# Patient Record
Sex: FEMALE | Race: WHITE | NOT HISPANIC OR LATINO | Employment: FULL TIME | URBAN - METROPOLITAN AREA
[De-identification: names, ages, dates, MRNs, and addresses within clinical notes are randomized per-mention and may not be internally consistent; named-entity substitution may affect disease eponyms.]

---

## 2017-09-12 ENCOUNTER — TRANSCRIBE ORDERS (OUTPATIENT)
Dept: ADMINISTRATIVE | Facility: HOSPITAL | Age: 22
End: 2017-09-12

## 2017-09-12 DIAGNOSIS — R79.89 LOW TSH LEVEL: Primary | ICD-10-CM

## 2017-09-28 ENCOUNTER — HOSPITAL ENCOUNTER (OUTPATIENT)
Dept: ULTRASOUND IMAGING | Facility: HOSPITAL | Age: 22
Discharge: HOME | End: 2017-09-28
Admitting: INTERNAL MEDICINE

## 2017-09-28 DIAGNOSIS — R79.89 LOW TSH LEVEL: ICD-10-CM

## 2017-09-28 PROCEDURE — 76536 US EXAM OF HEAD AND NECK: CPT

## 2017-10-10 ENCOUNTER — TRANSCRIBE ORDERS (OUTPATIENT)
Dept: ADMINISTRATIVE | Facility: HOSPITAL | Age: 22
End: 2017-10-10

## 2017-10-10 DIAGNOSIS — R59.9 LYMPH NODES ENLARGED: Primary | ICD-10-CM

## 2017-10-11 ENCOUNTER — HOSPITAL ENCOUNTER (OUTPATIENT)
Dept: CT IMAGING | Facility: HOSPITAL | Age: 22
Discharge: HOME | End: 2017-10-11
Admitting: INTERNAL MEDICINE

## 2017-10-11 DIAGNOSIS — R59.9 LYMPH NODES ENLARGED: ICD-10-CM

## 2017-10-11 PROCEDURE — 0 IOPAMIDOL 61 % SOLUTION: Performed by: INTERNAL MEDICINE

## 2017-10-11 PROCEDURE — 70491 CT SOFT TISSUE NECK W/DYE: CPT

## 2017-10-11 RX ADMIN — IOPAMIDOL 75 ML: 612 INJECTION, SOLUTION INTRAVENOUS at 18:05

## 2017-10-19 ENCOUNTER — OFFICE VISIT (OUTPATIENT)
Dept: NEUROLOGY | Facility: CLINIC | Age: 22
End: 2017-10-19

## 2017-10-19 VITALS
BODY MASS INDEX: 23.78 KG/M2 | HEIGHT: 66 IN | DIASTOLIC BLOOD PRESSURE: 60 MMHG | WEIGHT: 148 LBS | SYSTOLIC BLOOD PRESSURE: 100 MMHG

## 2017-10-19 DIAGNOSIS — G43.009 MIGRAINE WITHOUT AURA AND WITHOUT STATUS MIGRAINOSUS, NOT INTRACTABLE: Primary | ICD-10-CM

## 2017-10-19 PROCEDURE — 99212 OFFICE O/P EST SF 10 MIN: CPT | Performed by: PSYCHIATRY & NEUROLOGY

## 2017-10-19 RX ORDER — TOPIRAMATE 25 MG/1
25 TABLET ORAL NIGHTLY
Qty: 30 TABLET | Refills: 11 | Status: SHIPPED | OUTPATIENT
Start: 2017-10-19 | End: 2018-10-22 | Stop reason: SDUPTHER

## 2017-10-19 RX ORDER — RIZATRIPTAN BENZOATE 10 MG/1
10 TABLET ORAL ONCE AS NEEDED
Qty: 9 TABLET | Refills: 11 | Status: SHIPPED | OUTPATIENT
Start: 2017-10-19 | End: 2018-10-22 | Stop reason: SDUPTHER

## 2017-10-19 NOTE — PROGRESS NOTES
Subjective:     Patient ID: Taty Chacko is a 22 y.o. female.    History of Present Illness     Patient continues to have frequent severe headaches.  Tolerating topiramate 25 mg at night.  Maxalt 10 mg works.  No new problem.  The following portions of the patient's history were reviewed and updated as appropriate: allergies, current medications, past family history, past medical history, past social history, past surgical history and problem list.      Current Outpatient Prescriptions:   •  Etonogestrel (NEXPLANON) 68 MG implant subdermal implant, Inject 1 each into the skin 1 (One) Time., Disp: , Rfl:   •  ibuprofen (ADVIL,MOTRIN) 200 MG tablet, Take  by mouth., Disp: , Rfl:   •  rizatriptan (MAXALT) 10 MG tablet, Take 1 tablet by mouth 1 (One) Time As Needed for Migraine for up to 1 dose., Disp: 9 tablet, Rfl: 11  •  topiramate (TOPAMAX) 25 MG tablet, Take 1 tablet by mouth Every Night., Disp: 30 tablet, Rfl: 11    Review of Systems   Constitutional: Negative.    Neurological: Positive for headaches. Negative for dizziness, tremors, seizures, syncope, facial asymmetry, speech difficulty, weakness, light-headedness and numbness.   Psychiatric/Behavioral: Negative.         Objective:    Neurologic Exam  Mental status examination was appropriate.  Funduscopy, visual fields, eye movements and pupillary reflexes were normal.  No facial weakness was noted.  Gait was normal.  No pattern of focal weakness was noted.  Physical Exam    Assessment/Plan:     Taty was seen today for migraine.    Diagnoses and all orders for this visit:    Migraine without aura and without status migrainosus, not intractable    Other orders  -     topiramate (TOPAMAX) 25 MG tablet; Take 1 tablet by mouth Every Night.  -     rizatriptan (MAXALT) 10 MG tablet; Take 1 tablet by mouth 1 (One) Time As Needed for Migraine for up to 1 dose.       Prescription drug management - meds as above    Follow-up in the office in one year. Thank you for  allowing me to share in the care of this patient.  Walker Wright M.D.

## 2018-01-09 ENCOUNTER — TRANSCRIBE ORDERS (OUTPATIENT)
Dept: ADMINISTRATIVE | Facility: HOSPITAL | Age: 23
End: 2018-01-09

## 2018-01-09 DIAGNOSIS — R59.0 CERVICAL ADENOPATHY: Primary | ICD-10-CM

## 2018-01-09 DIAGNOSIS — E04.1 THYROID CYST: ICD-10-CM

## 2018-01-15 ENCOUNTER — HOSPITAL ENCOUNTER (OUTPATIENT)
Dept: ULTRASOUND IMAGING | Facility: HOSPITAL | Age: 23
Discharge: HOME | End: 2018-01-15
Admitting: NURSE PRACTITIONER

## 2018-01-15 ENCOUNTER — HOSPITAL ENCOUNTER (OUTPATIENT)
Dept: ULTRASOUND IMAGING | Facility: HOSPITAL | Age: 23
Discharge: HOME | End: 2018-01-15

## 2018-01-15 DIAGNOSIS — R59.0 CERVICAL ADENOPATHY: ICD-10-CM

## 2018-01-15 DIAGNOSIS — E04.1 THYROID CYST: ICD-10-CM

## 2018-01-15 PROCEDURE — 76536 US EXAM OF HEAD AND NECK: CPT

## 2018-04-23 ENCOUNTER — APPOINTMENT (OUTPATIENT)
Dept: CT IMAGING | Facility: HOSPITAL | Age: 23
End: 2018-04-23

## 2018-04-23 ENCOUNTER — HOSPITAL ENCOUNTER (EMERGENCY)
Facility: HOSPITAL | Age: 23
Discharge: HOME | End: 2018-04-24
Attending: EMERGENCY MEDICINE | Admitting: EMERGENCY MEDICINE

## 2018-04-23 VITALS
OXYGEN SATURATION: 100 % | BODY MASS INDEX: 23.3 KG/M2 | SYSTOLIC BLOOD PRESSURE: 121 MMHG | WEIGHT: 145 LBS | HEART RATE: 67 BPM | TEMPERATURE: 98.9 F | HEIGHT: 66 IN | RESPIRATION RATE: 15 BRPM | DIASTOLIC BLOOD PRESSURE: 74 MMHG

## 2018-04-23 DIAGNOSIS — N10 PYELONEPHRITIS, ACUTE: Primary | ICD-10-CM

## 2018-04-23 LAB
ALBUMIN SERPL-MCNC: 4.5 G/DL (ref 3.5–5.2)
ALBUMIN/GLOB SERPL: 1.5 G/DL
ALP SERPL-CCNC: 82 U/L (ref 39–117)
ALT SERPL W P-5'-P-CCNC: 9 U/L (ref 1–33)
ANION GAP SERPL CALCULATED.3IONS-SCNC: 14.6 MMOL/L
AST SERPL-CCNC: 16 U/L (ref 1–32)
BACTERIA UR QL AUTO: ABNORMAL /HPF
BASOPHILS # BLD AUTO: 0.05 10*3/MM3 (ref 0–0.2)
BASOPHILS NFR BLD AUTO: 0.4 % (ref 0–1.5)
BILIRUB SERPL-MCNC: 0.4 MG/DL (ref 0.1–1.2)
BILIRUB UR QL STRIP: NEGATIVE
BUN BLD-MCNC: 13 MG/DL (ref 6–20)
BUN/CREAT SERPL: 16.3 (ref 7–25)
CALCIUM SPEC-SCNC: 9.3 MG/DL (ref 8.6–10.5)
CHLORIDE SERPL-SCNC: 99 MMOL/L (ref 98–107)
CLARITY UR: CLEAR
CO2 SERPL-SCNC: 27.4 MMOL/L (ref 22–29)
COLOR UR: YELLOW
CREAT BLD-MCNC: 0.8 MG/DL (ref 0.57–1)
DEPRECATED RDW RBC AUTO: 48.6 FL (ref 37–54)
EOSINOPHIL # BLD AUTO: 0.14 10*3/MM3 (ref 0–0.7)
EOSINOPHIL NFR BLD AUTO: 1.1 % (ref 0.3–6.2)
ERYTHROCYTE [DISTWIDTH] IN BLOOD BY AUTOMATED COUNT: 15.6 % (ref 11.7–13)
GFR SERPL CREATININE-BSD FRML MDRD: 88 ML/MIN/1.73
GLOBULIN UR ELPH-MCNC: 3 GM/DL
GLUCOSE BLD-MCNC: 92 MG/DL (ref 65–99)
GLUCOSE UR STRIP-MCNC: NEGATIVE MG/DL
HCG SERPL QL: NEGATIVE
HCT VFR BLD AUTO: 41.6 % (ref 35.6–45.5)
HGB BLD-MCNC: 12.7 G/DL (ref 11.9–15.5)
HGB UR QL STRIP.AUTO: ABNORMAL
HOLD SPECIMEN: NORMAL
HYALINE CASTS UR QL AUTO: ABNORMAL /LPF
IMM GRANULOCYTES # BLD: 0.03 10*3/MM3 (ref 0–0.03)
IMM GRANULOCYTES NFR BLD: 0.2 % (ref 0–0.5)
KETONES UR QL STRIP: NEGATIVE
LEUKOCYTE ESTERASE UR QL STRIP.AUTO: ABNORMAL
LIPASE SERPL-CCNC: 40 U/L (ref 13–60)
LYMPHOCYTES # BLD AUTO: 2.8 10*3/MM3 (ref 0.9–4.8)
LYMPHOCYTES NFR BLD AUTO: 21.6 % (ref 19.6–45.3)
MCH RBC QN AUTO: 26.1 PG (ref 26.9–32)
MCHC RBC AUTO-ENTMCNC: 30.5 G/DL (ref 32.4–36.3)
MCV RBC AUTO: 85.6 FL (ref 80.5–98.2)
MONOCYTES # BLD AUTO: 1.49 10*3/MM3 (ref 0.2–1.2)
MONOCYTES NFR BLD AUTO: 11.5 % (ref 5–12)
NEUTROPHILS # BLD AUTO: 8.46 10*3/MM3 (ref 1.9–8.1)
NEUTROPHILS NFR BLD AUTO: 65.2 % (ref 42.7–76)
NITRITE UR QL STRIP: NEGATIVE
PH UR STRIP.AUTO: 6 [PH] (ref 5–8)
PLATELET # BLD AUTO: 299 10*3/MM3 (ref 140–500)
PMV BLD AUTO: 10.9 FL (ref 6–12)
POTASSIUM BLD-SCNC: 4 MMOL/L (ref 3.5–5.2)
PROT SERPL-MCNC: 7.5 G/DL (ref 6–8.5)
PROT UR QL STRIP: NEGATIVE
RBC # BLD AUTO: 4.86 10*6/MM3 (ref 3.9–5.2)
RBC # UR: ABNORMAL /HPF
REF LAB TEST METHOD: ABNORMAL
SODIUM BLD-SCNC: 141 MMOL/L (ref 136–145)
SP GR UR STRIP: <=1.005 (ref 1–1.03)
SQUAMOUS #/AREA URNS HPF: ABNORMAL /HPF
UROBILINOGEN UR QL STRIP: ABNORMAL
WBC NRBC COR # BLD: 12.97 10*3/MM3 (ref 4.5–10.7)
WBC UR QL AUTO: ABNORMAL /HPF
WHOLE BLOOD HOLD SPECIMEN: NORMAL
WHOLE BLOOD HOLD SPECIMEN: NORMAL

## 2018-04-23 PROCEDURE — 25010000002 KETOROLAC TROMETHAMINE PER 15 MG: Performed by: EMERGENCY MEDICINE

## 2018-04-23 PROCEDURE — 96365 THER/PROPH/DIAG IV INF INIT: CPT

## 2018-04-23 PROCEDURE — 87086 URINE CULTURE/COLONY COUNT: CPT

## 2018-04-23 PROCEDURE — 36415 COLL VENOUS BLD VENIPUNCTURE: CPT

## 2018-04-23 PROCEDURE — 96361 HYDRATE IV INFUSION ADD-ON: CPT

## 2018-04-23 PROCEDURE — 74176 CT ABD & PELVIS W/O CONTRAST: CPT

## 2018-04-23 PROCEDURE — 87186 SC STD MICRODIL/AGAR DIL: CPT | Performed by: EMERGENCY MEDICINE

## 2018-04-23 PROCEDURE — 25010000002 CEFTRIAXONE PER 250 MG: Performed by: EMERGENCY MEDICINE

## 2018-04-23 PROCEDURE — 99284 EMERGENCY DEPT VISIT MOD MDM: CPT

## 2018-04-23 PROCEDURE — 81001 URINALYSIS AUTO W/SCOPE: CPT

## 2018-04-23 PROCEDURE — 96375 TX/PRO/DX INJ NEW DRUG ADDON: CPT

## 2018-04-23 PROCEDURE — 83690 ASSAY OF LIPASE: CPT

## 2018-04-23 PROCEDURE — 80053 COMPREHEN METABOLIC PANEL: CPT

## 2018-04-23 PROCEDURE — 84703 CHORIONIC GONADOTROPIN ASSAY: CPT

## 2018-04-23 PROCEDURE — 85025 COMPLETE CBC W/AUTO DIFF WBC: CPT

## 2018-04-23 RX ORDER — SODIUM CHLORIDE 0.9 % (FLUSH) 0.9 %
10 SYRINGE (ML) INJECTION AS NEEDED
Status: DISCONTINUED | OUTPATIENT
Start: 2018-04-23 | End: 2018-04-24 | Stop reason: HOSPADM

## 2018-04-23 RX ORDER — KETOROLAC TROMETHAMINE 30 MG/ML
30 INJECTION, SOLUTION INTRAMUSCULAR; INTRAVENOUS ONCE
Status: COMPLETED | OUTPATIENT
Start: 2018-04-23 | End: 2018-04-23

## 2018-04-23 RX ORDER — ONDANSETRON 2 MG/ML
4 INJECTION INTRAMUSCULAR; INTRAVENOUS ONCE
Status: DISCONTINUED | OUTPATIENT
Start: 2018-04-23 | End: 2018-04-24 | Stop reason: HOSPADM

## 2018-04-23 RX ORDER — CEFTRIAXONE SODIUM 1 G/50ML
1 INJECTION, SOLUTION INTRAVENOUS ONCE
Status: COMPLETED | OUTPATIENT
Start: 2018-04-23 | End: 2018-04-24

## 2018-04-23 RX ORDER — SULFAMETHOXAZOLE AND TRIMETHOPRIM 800; 160 MG/1; MG/1
1 TABLET ORAL 2 TIMES DAILY
Qty: 14 TABLET | Refills: 0 | Status: SHIPPED | OUTPATIENT
Start: 2018-04-23 | End: 2018-10-22 | Stop reason: ALTCHOICE

## 2018-04-23 RX ADMIN — CEFTRIAXONE SODIUM 1 G: 1 INJECTION, SOLUTION INTRAVENOUS at 23:23

## 2018-04-23 RX ADMIN — SODIUM CHLORIDE 1000 ML: 9 INJECTION, SOLUTION INTRAVENOUS at 21:35

## 2018-04-23 RX ADMIN — KETOROLAC TROMETHAMINE 30 MG: 30 INJECTION, SOLUTION INTRAMUSCULAR at 21:36

## 2018-04-24 NOTE — ED PROVIDER NOTES
EMERGENCY DEPARTMENT ENCOUNTER    CHIEF COMPLAINT  Chief Complaint: Flank pain  History given by: patient   History limited by: n/a  Room Number: 34/34  PMD: Clara Ann Pallares, MD      HPI:  Pt is a 23 y.o. female who presents complaining of left sided flank pain that began last night and has progressively worsened. Pt states that the pan radiates into the abd. Pt states that she began to have dysuria 5 days ago, but this improved after taking OTC Azo and hydrating well. Pt denies nausea, vomiting, fever, chills, or any other sx. Pt reports a hx of kidney stone, but states that the pain tonight is not as severe as pain with prior stone.     Duration:  2 days   Onset: gradual  Timing: constant  Location: left flank   Radiation: into the left abd   Quality: pain  Intensity/Severity: moderate  Progression: unchanged   Associated Symptoms: none  Aggravating Factors: none  Alleviating Factors: none  Previous Episodes: hx of kidney stone     PAST MEDICAL HISTORY  Active Ambulatory Problems     Diagnosis Date Noted   • Headache, migraine 10/18/2016     Resolved Ambulatory Problems     Diagnosis Date Noted   • No Resolved Ambulatory Problems     Past Medical History:   Diagnosis Date   • Kidney stone    • Migraine        PAST SURGICAL HISTORY  Past Surgical History:   Procedure Laterality Date   • NO PAST SURGERIES         FAMILY HISTORY  Family History   Problem Relation Age of Onset   • Migraines Paternal Aunt    • Stroke Paternal Grandfather        SOCIAL HISTORY  Social History     Social History   • Marital status: Single     Spouse name: N/A   • Number of children: N/A   • Years of education: N/A     Occupational History   • Not on file.     Social History Main Topics   • Smoking status: Never Smoker   • Smokeless tobacco: Not on file   • Alcohol use Yes      Comment: social   • Drug use: No   • Sexual activity: Not on file     Other Topics Concern   • Not on file     Social History Narrative   • No narrative on  file       ALLERGIES  Review of patient's allergies indicates no known allergies.    REVIEW OF SYSTEMS  Review of Systems   Constitutional: Negative for fever.   HENT: Negative for sore throat.    Eyes: Negative.    Respiratory: Negative for cough and shortness of breath.    Cardiovascular: Negative for chest pain.   Gastrointestinal: Negative for abdominal pain, diarrhea and vomiting.   Genitourinary: Positive for flank pain (left). Negative for dysuria.   Musculoskeletal: Negative for neck pain.   Skin: Negative for rash.   Allergic/Immunologic: Negative.    Neurological: Negative for weakness, numbness and headaches.   Hematological: Negative.    Psychiatric/Behavioral: Negative.    All other systems reviewed and are negative.      PHYSICAL EXAM  ED Triage Vitals   Temp Heart Rate Resp BP SpO2   04/23/18 1847 04/23/18 1847 04/23/18 1910 04/23/18 1910 04/23/18 1847   98.6 °F (37 °C) 79 16 136/78 100 %      Temp src Heart Rate Source Patient Position BP Location FiO2 (%)   -- -- -- -- --              Physical Exam   Constitutional: She is oriented to person, place, and time and well-developed, well-nourished, and in no distress. No distress.   HENT:   Head: Normocephalic and atraumatic.   Eyes: EOM are normal. Pupils are equal, round, and reactive to light.   Neck: Normal range of motion. Neck supple.   Cardiovascular: Normal rate, regular rhythm and normal heart sounds.    Pulmonary/Chest: Effort normal and breath sounds normal. No respiratory distress.   Abdominal: Soft. There is no tenderness. There is CVA tenderness (left). There is no rebound and no guarding.   Musculoskeletal: Normal range of motion. She exhibits no edema.   Neurological: She is alert and oriented to person, place, and time.   Skin: Skin is warm and dry. No rash noted.   Psychiatric: Mood and affect normal.   Nursing note and vitals reviewed.      LAB RESULTS  Lab Results (last 24 hours)     Procedure Component Value Units Date/Time    CBC &  Differential [[id number]] Collected:  04/23/18 1934    Specimen:  Blood Updated:  04/23/18 1959    Narrative:       The following orders were created for panel order CBC & Differential.  Procedure                               Abnormality         Status                     ---------                               -----------         ------                     CBC Auto Differential[[id number]]        Abnormal            Final result                 Please view results for these tests on the individual orders.    Comprehensive Metabolic Panel [[id number]] Collected:  04/23/18 1934    Specimen:  Blood Updated:  04/23/18 2009     Glucose 92 mg/dL      BUN 13 mg/dL      Creatinine 0.80 mg/dL      Sodium 141 mmol/L      Potassium 4.0 mmol/L      Chloride 99 mmol/L      CO2 27.4 mmol/L      Calcium 9.3 mg/dL      Total Protein 7.5 g/dL      Albumin 4.50 g/dL      ALT (SGPT) 9 U/L      AST (SGOT) 16 U/L      Alkaline Phosphatase 82 U/L      Total Bilirubin 0.4 mg/dL      eGFR Non African Amer 88 mL/min/1.73      Globulin 3.0 gm/dL      A/G Ratio 1.5 g/dL      BUN/Creatinine Ratio 16.3     Anion Gap 14.6 mmol/L     Lipase [[id number]]  (Normal) Collected:  04/23/18 1934    Specimen:  Blood Updated:  04/23/18 2009     Lipase 40 U/L     Urinalysis With / Culture If Indicated - Urine, Clean Catch [[id number]]  (Abnormal) Collected:  04/23/18 1934    Specimen:  Urine from Urine, Clean Catch Updated:  04/23/18 1948     Color, UA Yellow     Appearance, UA Clear     pH, UA 6.0     Specific Gravity, UA <=1.005     Glucose, UA Negative     Ketones, UA Negative     Bilirubin, UA Negative     Blood, UA Large (3+) (A)     Protein, UA Negative     Leuk Esterase, UA Moderate (2+) (A)     Nitrite, UA Negative     Urobilinogen, UA 0.2 E.U./dL    hCG, Serum, Qualitative [[id number]]  (Normal) Collected:  04/23/18 1934    Specimen:  Blood Updated:  04/23/18 2004     HCG Qualitative Negative    CBC Auto Differential [[id number]]  (Abnormal) Collected:   04/23/18 1934    Specimen:  Blood Updated:  04/23/18 1959     WBC 12.97 (H) 10*3/mm3      RBC 4.86 10*6/mm3      Hemoglobin 12.7 g/dL      Hematocrit 41.6 %      MCV 85.6 fL      MCH 26.1 (L) pg      MCHC 30.5 (L) g/dL      RDW 15.6 (H) %      RDW-SD 48.6 fl      MPV 10.9 fL      Platelets 299 10*3/mm3      Neutrophil % 65.2 %      Lymphocyte % 21.6 %      Monocyte % 11.5 %      Eosinophil % 1.1 %      Basophil % 0.4 %      Immature Grans % 0.2 %      Neutrophils, Absolute 8.46 (H) 10*3/mm3      Lymphocytes, Absolute 2.80 10*3/mm3      Monocytes, Absolute 1.49 (H) 10*3/mm3      Eosinophils, Absolute 0.14 10*3/mm3      Basophils, Absolute 0.05 10*3/mm3      Immature Grans, Absolute 0.03 10*3/mm3     Urinalysis, Microscopic Only - Urine, Clean Catch [[id number]]  (Abnormal) Collected:  04/23/18 1934    Specimen:  Urine from Urine, Clean Catch Updated:  04/23/18 2024     RBC, UA 3-5 (A) /HPF      WBC, UA 31-50 (A) /HPF      Bacteria, UA 1+ (A) /HPF      Squamous Epithelial Cells, UA 0-2 /HPF      Hyaline Casts, UA 0-2 /LPF      Methodology Automated Microscopy    Urine Culture - Urine, Urine, Clean Catch [[id number]] Collected:  04/23/18 1934    Specimen:  Urine from Urine, Clean Catch Updated:  04/23/18 1948          I ordered the above labs and reviewed the results    RADIOLOGY  CT Abdomen Pelvis Without Contrast   Final Result   1.  No acute inflammatory process demonstrated by noncontrast technique                   This study was performed with techniques to keep radiation doses as low   as reasonably achievable (ALARA). Individualized dose reduction   techniques using automated exposure control or adjustment of mA and/or   kV according to the patient size were employed.        This report was finalized on 4/23/2018 10:11 PM by Amol Downing M.D..               I ordered the above noted radiological studies. Interpreted by radiologist. Reviewed by me in PACS.       PROCEDURES  Procedures      PROGRESS AND  CONSULTS  ED Course     21:11  BP- 134/80 HR- 82 Temp- 98.6 °F (37 °C) O2 sat- 100%  Advised pt that her UA shows a UTI. Plan for CT abd/pelvis. Pt understands and agrees with the plan, all questions answered.    21:16  IVF ordered for hydration. Toradol and Zofran ordered to treat pain and nausea. CT abd/pelvis ordered.     23:01  BP- 119/78 HR- 67 Temp- 98.9 °F (37.2 °C) (Oral) O2 sat- 100%  Rechecked the patient who is in NAD and is resting comfortably. Advised pt that the CT shows NAD. Plan to treat UTI with IV abx in the ED and PO abx after d/c. Pt understands and agrees with the plan, all questions answered.    23:11   Rocephin ordered to treat UTI    MEDICAL DECISION MAKING  Results were reviewed/discussed with the patient and they were also made aware of online access. Pt also made aware that some labs, such as cultures, will not be resulted during ER visit and follow up with PMD is necessary.     MDM  Number of Diagnoses or Management Options     Amount and/or Complexity of Data Reviewed  Clinical lab tests: reviewed and ordered (Urinalysis- RBC 3-5, WBC 31-50, Bacteria 1+)  Tests in the radiology section of CPT®: ordered and reviewed (CT abd/pelvis shows NAD)           DIAGNOSIS  Final diagnoses:   Pyelonephritis, acute       DISPOSITION  DISCHARGE    Patient discharged in stable condition.    Reviewed implications of results, diagnosis, meds, responsibility to follow up, warning signs and symptoms of possible worsening, potential complications and reasons to return to ER.    Patient/Family voiced understanding of above instructions.    Discussed plan for discharge, as there is no emergent indication for admission. Patient referred to primary care provider for BP management due to today's BP. Pt/family is agreeable and understands need for follow up and repeat testing.  Pt is aware that discharge does not mean that nothing is wrong but it indicates no emergency is present that requires admission and they  must continue care with follow-up as given below or physician of their choice.     FOLLOW-UP  Clara Ann Pallares, MD  [address]  [address]  [facility] [address]  [phone]    Schedule an appointment as soon as possible for a visit            Medication List      New Prescriptions    sulfamethoxazole-trimethoprim 800-160 MG per tablet  Commonly known as:  BACTRIM DS  Take 1 tablet by mouth 2 (Two) Times a Day.            Latest Documented Vital Signs:  As of 12:37 AM  BP- 121/74 HR- 67 Temp- 98.9 °F (37.2 °C) (Oral) O2 sat- 100%    --  Documentation assistance provided by ulisses Abreu for Dr Mtz.  Information recorded by the scribe was done at my direction and has been verified and validated by me.       Linda Abreu  04/23/18 [id number]       Roberto Mtz MD  04/24/18 0039

## 2018-04-25 LAB
BACTERIA SPEC AEROBE CULT: ABNORMAL
BACTERIA SPEC AEROBE CULT: ABNORMAL

## 2018-10-22 ENCOUNTER — OFFICE VISIT (OUTPATIENT)
Dept: NEUROLOGY | Facility: CLINIC | Age: 23
End: 2018-10-22

## 2018-10-22 VITALS
SYSTOLIC BLOOD PRESSURE: 130 MMHG | WEIGHT: 159 LBS | HEIGHT: 66 IN | OXYGEN SATURATION: 99 % | BODY MASS INDEX: 25.55 KG/M2 | DIASTOLIC BLOOD PRESSURE: 80 MMHG | HEART RATE: 78 BPM

## 2018-10-22 DIAGNOSIS — G43.009 MIGRAINE WITHOUT AURA AND WITHOUT STATUS MIGRAINOSUS, NOT INTRACTABLE: Primary | ICD-10-CM

## 2018-10-22 PROBLEM — G43.001 MIGRAINE WITHOUT AURA WITH STATUS MIGRAINOSUS: Status: ACTIVE | Noted: 2018-10-22

## 2018-10-22 PROCEDURE — 99213 OFFICE O/P EST LOW 20 MIN: CPT | Performed by: PSYCHIATRY & NEUROLOGY

## 2018-10-22 RX ORDER — RIZATRIPTAN BENZOATE 10 MG/1
10 TABLET ORAL ONCE AS NEEDED
Qty: 9 TABLET | Refills: 11 | Status: SHIPPED | OUTPATIENT
Start: 2018-10-22 | End: 2019-11-14 | Stop reason: SDUPTHER

## 2018-10-22 RX ORDER — SUMATRIPTAN 6 MG/.5ML
6 INJECTION, SOLUTION SUBCUTANEOUS ONCE
Qty: 4 VIAL | Refills: 2 | Status: SHIPPED | OUTPATIENT
Start: 2018-10-22 | End: 2018-10-22

## 2018-10-22 RX ORDER — TOPIRAMATE 25 MG/1
25 TABLET ORAL NIGHTLY
Qty: 30 TABLET | Refills: 11 | Status: SHIPPED | OUTPATIENT
Start: 2018-10-22 | End: 2018-11-13 | Stop reason: SDUPTHER

## 2018-10-22 NOTE — PROGRESS NOTES
Subjective:     Patient ID: Taty Chacko is a 23 y.o. female.    History of Present Illness  The patient's headaches are fairly well controlled.  Recently she had one that lasted for several days.  She is tolerating her medicine well.  No new problems.  She awoke with the prolonged migraine.    The following portions of the patient's history were reviewed and updated as appropriate: allergies, current medications, past family history, past medical history, past social history, past surgical history and problem list.      Current Outpatient Prescriptions:   •  ibuprofen (ADVIL,MOTRIN) 200 MG tablet, Take  by mouth., Disp: , Rfl:   •  levonorgestrel (KYLEENA) 19.5 MG intrauterine device IUD, 1 each by Intrauterine route 1 (One) Time., Disp: , Rfl:   •  Multiple Vitamins-Minerals (MULTIVITAMIN ADULT PO), Take  by mouth Daily., Disp: , Rfl:   •  rizatriptan (MAXALT) 10 MG tablet, Take 1 tablet by mouth 1 (One) Time As Needed for Migraine for up to 1 dose., Disp: 9 tablet, Rfl: 11  •  topiramate (TOPAMAX) 25 MG tablet, Take 1 tablet by mouth Every Night., Disp: 30 tablet, Rfl: 11  •  SUMAtriptan (IMITREX) 6 MG/0.5ML injection, Inject prescribed dose at onset of headache. May repeat dose one time in 1 hour(s) if headache not relieved., Disp: 4 vial, Rfl: 2    Review of Systems   Constitutional: Negative.    Neurological: Positive for dizziness and headaches. Negative for tremors, seizures, syncope, facial asymmetry, speech difficulty, weakness, light-headedness and numbness.   Psychiatric/Behavioral: Negative.         Objective:    Neurologic Exam  Mental status examination was appropriate.  Funduscopy, visual fields, eye movements and pupillary reflexes were normal.  No facial weakness was noted.  Gait was normal.  No pattern of focal weakness was noted.  Physical Exam    Assessment/Plan:     Taty was seen today for migraine.    Diagnoses and all orders for this visit:    Migraine without aura and without status  migrainosus, not intractable    Other orders  -     topiramate (TOPAMAX) 25 MG tablet; Take 1 tablet by mouth Every Night.  -     rizatriptan (MAXALT) 10 MG tablet; Take 1 tablet by mouth 1 (One) Time As Needed for Migraine for up to 1 dose.  -     SUMAtriptan (IMITREX) 6 MG/0.5ML injection; Inject prescribed dose at onset of headache. May repeat dose one time in 1 hour(s) if headache not relieved.         As the severe prolonged migraine she had started when she awoke I feel that trying sumatriptan injection for that type headache is warranted.  His is a new type of migraine.  Continue her other medicines.  Added magnesium oxide 400 mg a day.  Follow-up in the office in one year. Thank you for allowing me to share in the care of this patient.  Walker Wright M.D.

## 2018-11-13 RX ORDER — TOPIRAMATE 25 MG/1
25 TABLET ORAL NIGHTLY
Qty: 30 TABLET | Refills: 11 | Status: SHIPPED | OUTPATIENT
Start: 2018-11-13 | End: 2019-11-14 | Stop reason: SDUPTHER

## 2019-01-17 ENCOUNTER — OFFICE VISIT (OUTPATIENT)
Dept: ORTHOPEDIC SURGERY | Facility: CLINIC | Age: 24
End: 2019-01-17

## 2019-01-17 VITALS — TEMPERATURE: 97.5 F | BODY MASS INDEX: 24.11 KG/M2 | HEIGHT: 66 IN | WEIGHT: 150 LBS

## 2019-01-17 DIAGNOSIS — M25.562 ACUTE PAIN OF LEFT KNEE: Primary | ICD-10-CM

## 2019-01-17 DIAGNOSIS — M25.862 PATELLOFEMORAL DYSFUNCTION OF LEFT KNEE: ICD-10-CM

## 2019-01-17 PROCEDURE — 99203 OFFICE O/P NEW LOW 30 MIN: CPT | Performed by: ORTHOPAEDIC SURGERY

## 2019-01-17 PROCEDURE — 73562 X-RAY EXAM OF KNEE 3: CPT | Performed by: ORTHOPAEDIC SURGERY

## 2019-01-17 RX ORDER — BIOTIN 1 MG
1000 TABLET ORAL DAILY
COMMUNITY
End: 2020-12-15

## 2019-01-17 RX ORDER — FLUOXETINE HYDROCHLORIDE 20 MG/1
20 CAPSULE ORAL DAILY
Refills: 8 | COMMUNITY
Start: 2018-12-31 | End: 2020-12-15

## 2019-01-17 RX ORDER — MINOCYCLINE HYDROCHLORIDE 100 MG/1
100 CAPSULE ORAL 2 TIMES DAILY
Refills: 2 | COMMUNITY
Start: 2018-12-06 | End: 2019-11-14 | Stop reason: SDUPTHER

## 2019-01-17 NOTE — PROGRESS NOTES
New Left Knee      Patient: Taty Chacko        YOB: 1995    Medical Record Number: [id number]        Chief Complaints: left knee pain  Chief Complaint   Patient presents with   • Left Knee - Establish Care           History of Present Illness: This is a 23-year-old female who presents 20 of left knee pain is been ongoing since September injury change in activity she is a that tach is a lot of weightbearing and twisting she does have a history of right patella dislocation nothing on the left her pain is primarily anterior she does have swelling she has night pain symptoms are mild/moderate severe depending on activity intermittent stabbing aching with bruising and swelling worse with standing sitting driving walking somewhat better with ice and rest past medical history marked for depression anxiety        Allergies: No Known Allergies    Medications:   Home Medications:  Current Outpatient Medications on File Prior to Visit   Medication Sig   • ibuprofen (ADVIL,MOTRIN) 200 MG tablet Take  by mouth.   • levonorgestrel (KYLEENA) 19.5 MG intrauterine device IUD 1 each by Intrauterine route 1 (One) Time.   • Multiple Vitamins-Minerals (MULTIVITAMIN ADULT PO) Take  by mouth Daily.   • rizatriptan (MAXALT) 10 MG tablet Take 1 tablet by mouth 1 (One) Time As Needed for Migraine for up to 1 dose.   • topiramate (TOPAMAX) 25 MG tablet TAKE 1 TABLET BY MOUTH EVERY NIGHT     No current facility-administered medications on file prior to visit.      Current Medications:  Scheduled Meds:  Continuous Infusions:  No current facility-administered medications for this visit.   PRN Meds:.    Past Medical History:   Diagnosis Date   • Kidney stone    • Migraine         Past Surgical History:   Procedure Laterality Date   • NO PAST SURGERIES          Social History     Occupational History   • Not on file   Tobacco Use   • Smoking status: Never Smoker   Substance and Sexual Activity   • Alcohol use: Yes     Comment:  social   • Drug use: No   • Sexual activity: Not on file    Social History     Social History Narrative   • Not on file        Family History   Problem Relation Age of Onset   • Migraines Paternal Aunt    • Stroke Paternal Grandfather              Review of Systems: 14 point review of systems are remarkable for the pertinent positives listed in the chart by the patient the remainder are negative    Review of Systems      Physical Exam: 23 y.o. female  General Appearance:    Alert, cooperative, in no acute distress                 There were no vitals filed for this visit.   Patient is alert and read ×3 no acute distress appears her above-listed at height weight and age.  Affect is normal respiratory rate is normal unlabored. Heart rate regular rate rhythm, sclera, dentition and hearing are normal for the purpose of this exam.        Ortho Exam Physical exam of the left knee reveals no effusion, no erythema.  The patient has no palpable tenderness along the medial joint line, no tenderness about the lateral joint line.  Patient does have crepitus with patellofemoral range of motion.  They also have subjective symptoms anteriorly during exam.  The patient has a negative bounce home, negative Edward and a stable ligamentous exam.  Quad tone is reasonable and symmetric.  There are no overlying skin changes no lymphedema no lymphadenopathy.  There is good hip range of motion which is full symmetric and asymptomatic and a normal ankle exam.  Hamstrings and IT band are tight bilaterally.  She does have pes planus and mild genu valgum    Procedures             Radiology:   AP, Lateral and merchant views of the left knee  were ordered/reviewed to evauateknee pain.  Have no comparative films these are normalcy no evidence of any acute pathology she does have slightly lateral riding patella right greater than left  Imaging Results (most recent)     Procedure Component Value Units Date/Time    XR Knee 3 View Left [[id number]]  Resulted:  01/17/19 0947     Updated:  01/17/19 0947    Impression:       Ordering physician's impression is located in the Encounter Note dated 01/17/19. X-ray performed in the DR room.          Assessment/Plan:    Left knee pain I think this is more patellofemoral think we can get her foot in a better position I'll give her a J brace have her work with therapy really working on strengthening of her quads and I'll see her back in 4-6 weeks if she fails to improve we will pursue other means of testing

## 2019-01-21 ENCOUNTER — TREATMENT (OUTPATIENT)
Dept: PHYSICAL THERAPY | Facility: CLINIC | Age: 24
End: 2019-01-21

## 2019-01-21 DIAGNOSIS — R26.2 DIFFICULTY WALKING: ICD-10-CM

## 2019-01-21 DIAGNOSIS — M25.562 LEFT KNEE PAIN, UNSPECIFIED CHRONICITY: Primary | ICD-10-CM

## 2019-01-21 PROCEDURE — 97014 ELECTRIC STIMULATION THERAPY: CPT | Performed by: PHYSICAL THERAPIST

## 2019-01-21 PROCEDURE — 97161 PT EVAL LOW COMPLEX 20 MIN: CPT | Performed by: PHYSICAL THERAPIST

## 2019-01-21 PROCEDURE — 97110 THERAPEUTIC EXERCISES: CPT | Performed by: PHYSICAL THERAPIST

## 2019-01-21 NOTE — PROGRESS NOTES
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 I used to go to the [employer] and it started bothering me so I modified some activity. Before that, I was noticing knee pain when I squatted at work. It has started to feel worse. I have dislocated my right knee twice. Pain is on top and along the inside of my knee. It wakes me up in the middle of the night. My foot will kind of tingle sometimes and I don't know if that is from ankle sprains in the past. If I am at work I wear a good supportive shoe. I have tried ice and heat and I cannot tell a difference. Even when I take ibuprofen I cannot tell a difference.   Every once in awhile my hip will start to hurt but I think that is because I am favoring the left knee.  I want to be able to go back to the gym. In December I ran 3 miles, but the next day I was in bed in pain.      Patient Occupation: vet tech   Precautions and Work Restrictions: work 10-11 hours on my feet Pain  Current pain ratin  At best pain ratin  At worst pain ratin  Location: medial knee  Relieving factors: change in position  Aggravating factors: ambulation, squatting, stairs and sleeping (down stairs)  Progression: worsening    Diagnostic Tests  Abnormal x-ray: see imging.             Objective       Observations   Left Knee   Positive for edema.     Palpation   Left   Tenderness of the rectus femoris.     Tenderness   Left Knee   Tenderness in the ITB, medial patella, patellar tendon and quadriceps tendon.     Neurological Testing     Sensation     Knee   Left Knee   Intact: light  "touch    Right Knee   Intact: light touch     Active Range of Motion   Left Knee   Flexion: 118 degrees with pain  Extension: 0 degrees with pain    Right Knee   Flexion: 145 degrees   Extension: Right knee active extension: hyperextension.     Patellar Mobility   Left Knee Hypermobile in the left medial, left lateral, left superior and left inferior patellar tendon(s).     Right Knee Hypermobile in the medial, lateral, superior and inferior patellar tendon(s).     Additional Patellar Mobility Details  Pain    Strength/Myotome Testing     Left Hip   Planes of Motion   Flexion: 4  Abduction: 4+    Right Hip   Planes of Motion   Flexion: 5  Abduction: 5  External rotation: 5  Internal rotation: 5    Left Knee   Flexion: 4-  Extension: 3  Quadriceps contraction: fair    Right Knee   Flexion: 5  Extension: 5  Quadriceps contraction: good    Left Ankle/Foot   Dorsiflexion: 5    Right Ankle/Foot   Dorsiflexion: 5    Tests     Left Knee   Positive bounce home, patella-femoral grind, Thessaly's test at 5 degrees and Thessaly's test at 20 degrees.   Negative anterior drawer, posterior drawer, valgus stress test at 0 degrees and varus stress test at 0 degrees.     Ambulation     Ambulation: Stairs   Pattern: non-reciprocal  Pattern: non-reciprocal    Additional Stairs Ambulation Details  Pain with going down stairs    Quality of Movement During Gait     Knee    Knee (Right): Positive recurvatum.     Functional Assessment   Squat   Pain.     Forward Step Down 6\"   Left Leg  Pain.     Single Leg Stance   Left: 15 (max sway) seconds  Right: 15 seconds         Assessment & Plan     Assessment  Impairments: abnormal gait, abnormal or restricted ROM, impaired balance, impaired physical strength and pain with function  Assessment details: Pt presents to PT with symptoms consistent with L patellafemoral pain with possible meniscus injury. Pt has weakness and instability of L LE.  Pt would benefit from skilled PT intervention to " address the deficits noted.   Prognosis: good  Prognosis details: SHORT TERM GOALS:    2-3 weeks    1. Patient to be compliant with stretching and HEP    2. Pt to report 3/10 or less L knee pain with transfers and stair climbing.    3. Pt will improve L knee extension AROM to 130 degrees or more in order to improve stair and transfer ability    4.  Pt will demonstrate improved gait mechanics demonstrating equal stance time, decreased Trendelenburg and knee ext through midstance             LONG TERM GOALS:    2 months    1. Pt. to score >50/80 om LEFS    2.  Able to climb 1 flight of steps with reciprocal pattern due to improved pain and strength.    3.  Pt to improve LE endurance/strength in order to tolerate ambulating for 60 min without rest break due to improved hip and knee strength    4. Pt will improve B hip flexion, abduction and extension to 5/5 MMT in order to improve gait and stair climbing as well as decrease fall risk  Functional Limitations: sleeping, walking, uncomfortable because of pain and standing  Goals  Plan Goals:       Plan  Therapy options: will be seen for skilled physical therapy services  Planned modality interventions: cryotherapy, electrical stimulation/Russian stimulation, iontophoresis, TENS, thermotherapy (hydrocollator packs) and ultrasound  Planned therapy interventions: abdominal trunk stabilization, ADL retraining, balance/weight-bearing training, flexibility, functional ROM exercises, gait training, home exercise program, joint mobilization, manual therapy, neuromuscular re-education, postural training, soft tissue mobilization, spinal/joint mobilization, strengthening, stretching and therapeutic activities  Duration in visits: 14  Treatment plan discussed with: patient        Manual Therapy:    -     mins  16173;  Therapeutic Exercise:    10     mins  77319;     Neuromuscular Cynthia:    -    mins  17827;    Therapeutic Activity:     -     mins  31411;     Gait Training:      -      mins  40629;     Ultrasound:     -     mins  73571;    Electrical Stimulation:    15     mins  86373 ( );  Dry Needling     -     mins self-pay    Timed Treatment:   10   mins   Total Treatment:     70   mins      PT SIGNATURE: Shirlene Martinez, PT   DATE TREATMENT INITIATED: 1/21/2019    Initial Certification  Certification Period: 4/21/2019  I certify that the therapy services are furnished while this patient is under my care.  The services outlined above are required by this patient, and will be reviewed every 90 days.     PHYSICIAN: Raissa Hodges MD      DATE:     Please sign and return via fax to [phone].. Thank you, [facility].

## 2019-01-23 ENCOUNTER — TREATMENT (OUTPATIENT)
Dept: PHYSICAL THERAPY | Facility: CLINIC | Age: 24
End: 2019-01-23

## 2019-01-23 DIAGNOSIS — M25.562 LEFT KNEE PAIN, UNSPECIFIED CHRONICITY: Primary | ICD-10-CM

## 2019-01-23 DIAGNOSIS — R26.2 DIFFICULTY WALKING: ICD-10-CM

## 2019-01-23 PROCEDURE — 97014 ELECTRIC STIMULATION THERAPY: CPT | Performed by: PHYSICAL THERAPIST

## 2019-01-23 PROCEDURE — 97112 NEUROMUSCULAR REEDUCATION: CPT | Performed by: PHYSICAL THERAPIST

## 2019-01-23 PROCEDURE — 97110 THERAPEUTIC EXERCISES: CPT | Performed by: PHYSICAL THERAPIST

## 2019-01-23 NOTE — PROGRESS NOTES
Physical Therapy Daily Progress Note  Visit: 2    Taty Chacko reports: I was sore after the evaluation, but it has been feeling better the past 2 days.     Subjective     Objective   See Exercise, Manual, and Modality Logs for complete treatment.       Assessment & Plan     Assessment  Assessment details: Pt did excellent with progression of exercise. Continues to have quad tightness and limited knee flexion    Plan  Plan details: Add prone quad stretch next session        Manual Therapy:    -     mins  42391;  Therapeutic Exercise:    30     mins  91369;     Neuromuscular Cynthia:    10    mins  81497;    Therapeutic Activity:     -     mins  19398;     Gait Training:      -     mins  59507;     Ultrasound:     -     mins  07058;    Electrical Stimulation:    15     mins  03916 ( );  Dry Needling     -     mins self-pay    Timed Treatment:   40   mins   Total Treatment:     60   mins    Shirlene Martinez PT  [place] License #: [id number]    Physical Therapist

## 2019-01-29 ENCOUNTER — TREATMENT (OUTPATIENT)
Dept: PHYSICAL THERAPY | Facility: CLINIC | Age: 24
End: 2019-01-29

## 2019-01-29 DIAGNOSIS — R26.2 DIFFICULTY WALKING: ICD-10-CM

## 2019-01-29 DIAGNOSIS — M25.562 LEFT KNEE PAIN, UNSPECIFIED CHRONICITY: Primary | ICD-10-CM

## 2019-01-29 PROCEDURE — 97112 NEUROMUSCULAR REEDUCATION: CPT | Performed by: PHYSICAL THERAPIST

## 2019-01-29 PROCEDURE — 97110 THERAPEUTIC EXERCISES: CPT | Performed by: PHYSICAL THERAPIST

## 2019-01-29 PROCEDURE — 97035 APP MDLTY 1+ULTRASOUND EA 15: CPT | Performed by: PHYSICAL THERAPIST

## 2019-01-29 NOTE — PROGRESS NOTES
Physical Therapy Daily Progress Note  Visit: 3    Taty Chacko reports: My hips have been a little sore, but I think it is muscular    Subjective     Objective   See Exercise, Manual, and Modality Logs for complete treatment.       Assessment & Plan     Assessment  Assessment details: Pt doing well. Trial of ultrasound today and added prone quad stretch to HEP    Plan  Plan details: Progress with resisted walking and step ups BOSU next session        Manual Therapy:    -     mins  90940;  Therapeutic Exercise:    30     mins  81213;     Neuromuscular Cynthia:    10    mins  82024;    Therapeutic Activity:     -     mins  44182;     Gait Training:      -     mins  10238;     Ultrasound:     10     mins  66017;    Electrical Stimulation:    -     mins  44327 ( );  Dry Needling     -     mins self-pay    Timed Treatment:   50   mins   Total Treatment:     65   mins    Shirlene Martinez PT  [place] License #: [id number]    Physical Therapist

## 2019-02-01 ENCOUNTER — TREATMENT (OUTPATIENT)
Dept: PHYSICAL THERAPY | Facility: CLINIC | Age: 24
End: 2019-02-01

## 2019-02-01 DIAGNOSIS — R26.2 DIFFICULTY WALKING: ICD-10-CM

## 2019-02-01 DIAGNOSIS — M25.562 LEFT KNEE PAIN, UNSPECIFIED CHRONICITY: Primary | ICD-10-CM

## 2019-02-01 PROCEDURE — 97110 THERAPEUTIC EXERCISES: CPT | Performed by: PHYSICAL THERAPIST

## 2019-02-01 PROCEDURE — 97112 NEUROMUSCULAR REEDUCATION: CPT | Performed by: PHYSICAL THERAPIST

## 2019-02-01 PROCEDURE — 97035 APP MDLTY 1+ULTRASOUND EA 15: CPT | Performed by: PHYSICAL THERAPIST

## 2019-02-01 NOTE — PROGRESS NOTES
Physical Therapy Daily Progress Note  Visit: 4    Taty Chacko reports: I have had a dull ache in my left knee the past 2 days. I don't know if it is the weather or what. I am not having sharp pain anymore though and I do think overall it is getting better    Subjective     Objective   See Exercise, Manual, and Modality Logs for complete treatment.       Assessment & Plan     Assessment  Assessment details: Pt is doing well. Educated to continue HEP. Will continue building with strength and stability of left knee     Plan  Plan details: Progress per pOC        Manual Therapy:    -     mins  47246;  Therapeutic Exercise:    25     mins  77778;     Neuromuscular Cynthia:    8    mins  90601;    Therapeutic Activity:     -     mins  57794;     Gait Training:      -     mins  26275;     Ultrasound:     10     mins  66066;    Electrical Stimulation:    -     mins  98175 ( );  Dry Needling     -     mins self-pay    Timed Treatment:   43   mins   Total Treatment:     55   mins    Shirlene Martinez PT  [place] License #: [id number]    Physical Therapist

## 2019-02-04 ENCOUNTER — TREATMENT (OUTPATIENT)
Dept: PHYSICAL THERAPY | Facility: CLINIC | Age: 24
End: 2019-02-04

## 2019-02-04 DIAGNOSIS — R26.2 DIFFICULTY WALKING: ICD-10-CM

## 2019-02-04 DIAGNOSIS — M25.562 LEFT KNEE PAIN, UNSPECIFIED CHRONICITY: Primary | ICD-10-CM

## 2019-02-04 PROCEDURE — 97140 MANUAL THERAPY 1/> REGIONS: CPT | Performed by: PHYSICAL THERAPIST

## 2019-02-04 PROCEDURE — 97110 THERAPEUTIC EXERCISES: CPT | Performed by: PHYSICAL THERAPIST

## 2019-02-04 PROCEDURE — 97035 APP MDLTY 1+ULTRASOUND EA 15: CPT | Performed by: PHYSICAL THERAPIST

## 2019-02-04 NOTE — PROGRESS NOTES
Physical Therapy Daily Progress Note  Visit: 5    Taty Chacko reports: My knee has been sore still    Subjective     Objective   See Exercise, Manual, and Modality Logs for complete treatment.       Assessment & Plan     Assessment  Assessment details: Pt having increased pain with loading knee joint. Deferred BOSU ball and leg press today. Added prone hip ext and hip ADD to HEP    Plan  Plan details: Progress per POC as able        Manual Therapy:    8     mins  39824;  Therapeutic Exercise:    28     mins  77757;     Neuromuscular Cynthia:    5    mins  63036;    Therapeutic Activity:     -     mins  45785;     Gait Training:      -     mins  28954;     Ultrasound:     10     mins  43267;    Electrical Stimulation:    -     mins  00756 ( );  Dry Needling     -     mins self-pay    Timed Treatment:   51   mins   Total Treatment:     65   mins    Shirlene Martinez PT  [place] License #: [id number]    Physical Therapist

## 2019-02-06 ENCOUNTER — TREATMENT (OUTPATIENT)
Dept: PHYSICAL THERAPY | Facility: CLINIC | Age: 24
End: 2019-02-06

## 2019-02-06 DIAGNOSIS — R26.2 DIFFICULTY WALKING: ICD-10-CM

## 2019-02-06 DIAGNOSIS — M25.562 LEFT KNEE PAIN, UNSPECIFIED CHRONICITY: Primary | ICD-10-CM

## 2019-02-06 PROCEDURE — 97112 NEUROMUSCULAR REEDUCATION: CPT | Performed by: PHYSICAL THERAPIST

## 2019-02-06 PROCEDURE — 97110 THERAPEUTIC EXERCISES: CPT | Performed by: PHYSICAL THERAPIST

## 2019-02-06 NOTE — PROGRESS NOTES
Physical Therapy Daily Progress Note  Visit: 6    Taty Chacko reports: My left knee is sore and I don't know if it is the rain or not because my right knee is sore too    Subjective     Objective   See Exercise, Manual, and Modality Logs for complete treatment.       Assessment & Plan     Assessment  Assessment details: Pt continues to have medial knee pain. Continues to test positive for patellafemoral and meniscus injury. Her ROM and strength in her L knee and hips have significantly improved though.    Plan  Plan details: Contact MD next week if sx's do not improve        Manual Therapy:    -     mins  07587;  Therapeutic Exercise:    39     mins  74793;     Neuromuscular Cynthia:    10    mins  80932;    Therapeutic Activity:     -     mins  41582;     Gait Training:      -     mins  81107;     Ultrasound:     -     mins  78131;    Electrical Stimulation:    -     mins  63853 ( );  Dry Needling     -     mins self-pay    Timed Treatment:   49   mins   Total Treatment:     65   mins    Shirlene Martinez PT  [place] License #: [id number]    Physical Therapist

## 2019-02-11 ENCOUNTER — TREATMENT (OUTPATIENT)
Dept: PHYSICAL THERAPY | Facility: CLINIC | Age: 24
End: 2019-02-11

## 2019-02-11 DIAGNOSIS — M25.562 LEFT KNEE PAIN, UNSPECIFIED CHRONICITY: Primary | ICD-10-CM

## 2019-02-11 DIAGNOSIS — R26.2 DIFFICULTY WALKING: ICD-10-CM

## 2019-02-11 PROCEDURE — 97112 NEUROMUSCULAR REEDUCATION: CPT | Performed by: PHYSICAL THERAPIST

## 2019-02-11 PROCEDURE — 97110 THERAPEUTIC EXERCISES: CPT | Performed by: PHYSICAL THERAPIST

## 2019-02-11 NOTE — PROGRESS NOTES
Physical Therapy Daily Progress Note  Visit: 7    Taty Chacko reports: My knee is still the same.     Subjective     Objective   See Exercise, Manual, and Modality Logs for complete treatment.       Assessment & Plan     Assessment  Assessment details: Pt continues to have medial C-shaped left knee pain. She describes it as constant and is not getting better at this time. Will contact MD regarding sx's    Plan  Plan details: Contact MD for possible imaging at this time        Manual Therapy:    -     mins  37670;  Therapeutic Exercise:    30     mins  99288;     Neuromuscular Cynthia:    10    mins  18586;    Therapeutic Activity:     -     mins  39274;     Gait Training:      -     mins  68840;     Ultrasound:     -     mins  29779;    Electrical Stimulation:    -     mins  44058 ( );  Dry Needling     -     mins self-pay    Timed Treatment:   40   mins   Total Treatment:     53   mins    Shirlene Martinez PT  [place] License #: [id number]    Physical Therapist

## 2019-02-25 ENCOUNTER — OFFICE VISIT (OUTPATIENT)
Dept: ORTHOPEDIC SURGERY | Facility: CLINIC | Age: 24
End: 2019-02-25

## 2019-02-25 VITALS — BODY MASS INDEX: 24.11 KG/M2 | WEIGHT: 150 LBS | HEIGHT: 66 IN | TEMPERATURE: 98 F

## 2019-02-25 DIAGNOSIS — M25.362 PATELLAR INSTABILITY OF LEFT KNEE: Primary | ICD-10-CM

## 2019-02-25 PROCEDURE — 99213 OFFICE O/P EST LOW 20 MIN: CPT | Performed by: ORTHOPAEDIC SURGERY

## 2019-02-25 NOTE — PROGRESS NOTES
"Left Knee Follow Up      Patient: Taty Chacko        YOB: 1995            Chief Complaints: Left knee pain  Chief Complaint   Patient presents with   • Left Knee - Follow-up, Pain         History of Present Illness: Patient is here follow-up of left knee pain she has had some patellar instability we done physical therapy bracing strengthening ice rest all with no lasting improvement still has activity limiting pain      Physical Exam: 23 y.o. female  General Appearance:    Alert, cooperative, in no acute distress                   Vitals:    02/25/19 1351   Temp: 98 °F (36.7 °C)   TempSrc: Temporal   Weight: 68 kg (150 lb)   Height: 167.6 cm (66\")        Patient is alert and read ×3 no acute distress appears her above-listed at height weight and age.  Affect is normal respiratory rate is normal unlabored. Heart rate regular rate rhythm, sclera, dentition and hearing are normal for the purpose of this exam.      Ortho Exam     Physical exam of the left knee reveals no effusion no redness.  The patient does have tenderness about the medial joint line.  No tenderness about the lateral joint line.  A negative bounce home and a positive medial Edward.  There is some pain medially  with a lateral Edward.  Patient has a stable ligamentous exam.  Quads are reasonable and symmetric bilaterally.  Calf is soft and nontender.  There is no overlying skin changes no lymphedema lymphadenopathy.  Patient has good hip range of motion full symmetric and asymptomatic and a normal ankle exam.    I did review her x-rays AP lateral merchant view of her last visit these are normal patella appeared to sit centrally        Assessment/Plan:      Persistent left knee pain I think most of this is related to her patella however she does have some tenderness over her medial joint line could possibly be an odd presentation of meniscal pathology plan is to proceed with an MRI as we have failed conservative management      "

## 2019-03-04 ENCOUNTER — HOSPITAL ENCOUNTER (OUTPATIENT)
Dept: MRI IMAGING | Facility: HOSPITAL | Age: 24
Discharge: HOME | End: 2019-03-04
Admitting: ORTHOPAEDIC SURGERY

## 2019-03-04 DIAGNOSIS — M25.362 PATELLAR INSTABILITY OF LEFT KNEE: ICD-10-CM

## 2019-03-04 PROCEDURE — 73721 MRI JNT OF LWR EXTRE W/O DYE: CPT

## 2019-03-15 ENCOUNTER — OFFICE VISIT (OUTPATIENT)
Dept: ORTHOPEDIC SURGERY | Facility: CLINIC | Age: 24
End: 2019-03-15

## 2019-03-15 VITALS — TEMPERATURE: 97.7 F | BODY MASS INDEX: 27.66 KG/M2 | HEIGHT: 65 IN | WEIGHT: 166 LBS

## 2019-03-15 DIAGNOSIS — M25.862 PATELLOFEMORAL DYSFUNCTION OF LEFT KNEE: Primary | ICD-10-CM

## 2019-03-15 PROCEDURE — 99213 OFFICE O/P EST LOW 20 MIN: CPT | Performed by: ORTHOPAEDIC SURGERY

## 2019-03-15 PROCEDURE — 20610 DRAIN/INJ JOINT/BURSA W/O US: CPT | Performed by: ORTHOPAEDIC SURGERY

## 2019-03-15 RX ADMIN — METHYLPREDNISOLONE ACETATE 80 MG: 80 INJECTION, SUSPENSION INTRA-ARTICULAR; INTRALESIONAL; INTRAMUSCULAR; SOFT TISSUE at 14:39

## 2019-03-15 RX ADMIN — LIDOCAINE HYDROCHLORIDE 4 ML: 20 INJECTION, SOLUTION EPIDURAL; INFILTRATION; INTRACAUDAL; PERINEURAL at 14:39

## 2019-03-15 NOTE — PROGRESS NOTES
"Left Knee MRI Follow Up      Patient: Taty Chacko        YOB: 1995            Chief Complaints:left Knee pain  Chief Complaint   Patient presents with   • Left Knee - Follow-up         History of Present Illness: The patient is here follow-up of an MRI of the knee MRI demonstrates some chondromalacia of the patella no meniscus tear or other internal derangement she still has pain anterior medial to the patella no swelling she does have some locking      Physical Exam: 24 y.o. female  General Appearance:    Alert, cooperative, in no acute distress                   Vitals:    03/15/19 1358   Temp: 97.7 °F (36.5 °C)   Weight: 75.3 kg (166 lb)   Height: 165.1 cm (65\")        Patient is alert and read ×3 no acute distress appears her above-listed at height weight and age.  Affect is normal respiratory rate is normal unlabored. Heart rate regular rate rhythm, sclera, dentition and hearing are normal for the purpose of this exam.      Ortho Exam  Physical exam of the left knee reveals no effusion, no erythema.  The patient has no palpable tenderness along the medial joint line, no tenderness about the lateral joint line.  Patient does have crepitus with patellofemoral range of motion.  They also have subjective symptoms anteriorly during exam.  The patient has a negative bounce home, negative Edward and a stable ligamentous exam.  Quad tone is reasonable and symmetric.  There are no overlying skin changes no lymphedema no lymphadenopathy.  There is good hip range of motion which is full symmetric and asymptomatic and a normal ankle exam.  Hamstrings and IT band are tight bilaterally.      MRI Results: MRI is as above I have reviewed the films myself and agree with the findings and reviewed them with the patient    Large Joint Arthrocentesis: L knee  Date/Time: 3/15/2019 2:39 PM  Consent given by: patient  Site marked: site marked  Timeout: Immediately prior to procedure a time out was called to verify " the correct patient, procedure, equipment, support staff and site/side marked as required   Supporting Documentation  Indications: pain   Procedure Details  Location: knee - L knee  Preparation: Patient was prepped and draped in the usual sterile fashion  Needle size: 22 G  Approach: anteromedial  Medications administered: 4 mL lidocaine PF 2% 2 %; 80 mg methylPREDNISolone acetate 80 MG/ML  Patient tolerance: patient tolerated the procedure well with no immediate complications            Assessment/Plan:      Left knee pain that I think is more patellofemoral if her symptoms do not resolve with injection which I think we should do in physical therapy we could consider arthroscopic evaluation

## 2019-03-18 RX ORDER — METHYLPREDNISOLONE ACETATE 80 MG/ML
80 INJECTION, SUSPENSION INTRA-ARTICULAR; INTRALESIONAL; INTRAMUSCULAR; SOFT TISSUE
Status: COMPLETED | OUTPATIENT
Start: 2019-03-15 | End: 2019-03-15

## 2019-03-18 RX ORDER — LIDOCAINE HYDROCHLORIDE 20 MG/ML
4 INJECTION, SOLUTION EPIDURAL; INFILTRATION; INTRACAUDAL; PERINEURAL
Status: COMPLETED | OUTPATIENT
Start: 2019-03-15 | End: 2019-03-15

## 2019-04-04 ENCOUNTER — DOCUMENTATION (OUTPATIENT)
Dept: PHYSICAL THERAPY | Facility: CLINIC | Age: 24
End: 2019-04-04

## 2019-04-04 NOTE — PROGRESS NOTES
Pt being Dc'd from PT at this time due to not being seen for over 30 days.    Shirlene Martinez  License #: [id number]

## 2019-11-14 ENCOUNTER — TELEPHONE (OUTPATIENT)
Dept: NEUROLOGY | Facility: CLINIC | Age: 24
End: 2019-11-14

## 2019-11-14 ENCOUNTER — OFFICE VISIT (OUTPATIENT)
Dept: NEUROLOGY | Facility: CLINIC | Age: 24
End: 2019-11-14

## 2019-11-14 VITALS
BODY MASS INDEX: 28.66 KG/M2 | HEIGHT: 65 IN | WEIGHT: 172 LBS | DIASTOLIC BLOOD PRESSURE: 68 MMHG | SYSTOLIC BLOOD PRESSURE: 114 MMHG

## 2019-11-14 DIAGNOSIS — G43.001 MIGRAINE WITHOUT AURA AND WITH STATUS MIGRAINOSUS, NOT INTRACTABLE: Primary | ICD-10-CM

## 2019-11-14 PROCEDURE — 99213 OFFICE O/P EST LOW 20 MIN: CPT | Performed by: PSYCHIATRY & NEUROLOGY

## 2019-11-14 RX ORDER — RIZATRIPTAN BENZOATE 10 MG/1
10 TABLET ORAL ONCE AS NEEDED
Qty: 9 TABLET | Refills: 11 | Status: SHIPPED | OUTPATIENT
Start: 2019-11-14 | End: 2020-12-15

## 2019-11-14 RX ORDER — TOPIRAMATE 50 MG/1
50 TABLET, FILM COATED ORAL NIGHTLY
Qty: 90 TABLET | Refills: 3 | Status: SHIPPED | OUTPATIENT
Start: 2019-11-14 | End: 2020-11-17 | Stop reason: SDUPTHER

## 2019-11-14 RX ORDER — HYDROXYZINE HYDROCHLORIDE 25 MG/1
25-50 TABLET, FILM COATED ORAL EVERY 6 HOURS PRN
COMMUNITY
Start: 2019-04-16 | End: 2022-06-24 | Stop reason: SDUPTHER

## 2019-11-14 RX ORDER — MINOCYCLINE HYDROCHLORIDE 50 MG/1
50 CAPSULE ORAL 2 TIMES DAILY
Refills: 5 | COMMUNITY
Start: 2019-11-07 | End: 2023-01-11

## 2019-11-14 NOTE — PROGRESS NOTES
Subjective:     Patient ID: Taty Chacko is a 24 y.o. female.    History of Present Illness    The patient continues to have headaches once a month but they last somewhat longer to 3 days at a time.  She is on topiramate 25 mg at night.  No side effects.  Maxalt 10 mg works fairly well.  She is no longer taking magnesium.  She no longer needs the injectable sumatriptan.  The following portions of the patient's history were reviewed and updated as appropriate: allergies, current medications, past family history, past medical history, past social history, past surgical history and problem list.      Current Outpatient Medications:   •  hydrOXYzine (ATARAX) 25 MG tablet, Take 25-50 mg by mouth., Disp: , Rfl:   •  minocycline (MINOCIN,DYNACIN) 50 MG capsule, Take 25 mg by mouth 2 (Two) Times a Day., Disp: , Rfl: 5  •  rizatriptan (MAXALT) 10 MG tablet, Take 1 tablet by mouth 1 (One) Time As Needed for Migraine for up to 1 dose., Disp: 9 tablet, Rfl: 11  •  topiramate (TOPAMAX) 50 MG tablet, Take 1 tablet by mouth Every Night., Disp: 90 tablet, Rfl: 3  •  Biotin 1000 MCG tablet, Take 1,000 mcg by mouth Daily., Disp: , Rfl:   •  FLUoxetine (PROzac) 20 MG capsule, Take 20 mg by mouth Daily., Disp: , Rfl: 8  •  ibuprofen (ADVIL,MOTRIN) 200 MG tablet, Take  by mouth., Disp: , Rfl:   •  levonorgestrel (KYLEENA) 19.5 MG intrauterine device IUD, 1 each by Intrauterine route 1 (One) Time., Disp: , Rfl:   •  Multiple Vitamins-Minerals (MULTIVITAMIN ADULT PO), Take  by mouth Daily., Disp: , Rfl:   •  Probiotic Product (PROBIOTIC DAILY PO), Take  by mouth., Disp: , Rfl:     Review of Systems   Constitutional: Positive for fatigue. Negative for chills and fever.   HENT: Negative for hearing loss, tinnitus and trouble swallowing.    Eyes: Positive for photophobia. Negative for pain and itching.   Respiratory: Negative for cough, shortness of breath and wheezing.    Cardiovascular: Negative for chest pain, palpitations and leg  swelling.   Gastrointestinal: Positive for nausea and vomiting. Negative for diarrhea.   Endocrine: Negative for cold intolerance, heat intolerance and polydipsia.   Genitourinary: Negative for decreased urine volume, difficulty urinating and urgency.   Musculoskeletal: Negative for back pain, neck pain and neck stiffness.   Allergic/Immunologic: Negative for environmental allergies, food allergies and immunocompromised state.   Neurological: Positive for headaches. Negative for dizziness, tremors, seizures, syncope, facial asymmetry, speech difficulty, weakness, light-headedness and numbness.   Hematological: Negative for adenopathy. Does not bruise/bleed easily.   Psychiatric/Behavioral: Negative for confusion and sleep disturbance. The patient is not nervous/anxious.           I have reviewed ROS completed by medical assistant.     Objective:    Neurologic Exam  Mental status examination was appropriate.  Funduscopy, visual fields, eye movements and pupillary reflexes were normal.  No facial weakness was noted.  Gait was normal.  No pattern of focal weakness was noted.  Physical Exam    Assessment/Plan:     Taty was seen today for migraine.    Diagnoses and all orders for this visit:    Migraine without aura and with status migrainosus, not intractable    Other orders  -     rizatriptan (MAXALT) 10 MG tablet; Take 1 tablet by mouth 1 (One) Time As Needed for Migraine for up to 1 dose.  -     topiramate (TOPAMAX) 50 MG tablet; Take 1 tablet by mouth Every Night.         Because of the increased duration of headaches I have increased topiramate to 50 mg at night.  Prescription drug management - meds as above    Follow-up in the office in one year. Thank you for allowing me to share in the care of this patient.  Walker Wright M.D.

## 2020-08-14 ENCOUNTER — APPOINTMENT (OUTPATIENT)
Dept: GENERAL RADIOLOGY | Facility: HOSPITAL | Age: 25
End: 2020-08-14

## 2020-08-14 ENCOUNTER — HOSPITAL ENCOUNTER (EMERGENCY)
Facility: HOSPITAL | Age: 25
Discharge: HOME | End: 2020-08-14
Attending: EMERGENCY MEDICINE | Admitting: EMERGENCY MEDICINE

## 2020-08-14 VITALS
HEART RATE: 79 BPM | TEMPERATURE: 98.2 F | RESPIRATION RATE: 16 BRPM | SYSTOLIC BLOOD PRESSURE: 116 MMHG | DIASTOLIC BLOOD PRESSURE: 63 MMHG | OXYGEN SATURATION: 100 %

## 2020-08-14 DIAGNOSIS — S83.004A DISLOCATION OF RIGHT PATELLA, INITIAL ENCOUNTER: Primary | ICD-10-CM

## 2020-08-14 PROCEDURE — 99284 EMERGENCY DEPT VISIT MOD MDM: CPT

## 2020-08-14 PROCEDURE — 96374 THER/PROPH/DIAG INJ IV PUSH: CPT

## 2020-08-14 PROCEDURE — 73560 X-RAY EXAM OF KNEE 1 OR 2: CPT

## 2020-08-14 PROCEDURE — 25010000002 HYDROMORPHONE PER 4 MG: Performed by: EMERGENCY MEDICINE

## 2020-08-14 PROCEDURE — 25010000002 PROPOFOL 10 MG/ML EMULSION: Performed by: EMERGENCY MEDICINE

## 2020-08-14 RX ORDER — HYDROMORPHONE HYDROCHLORIDE 1 MG/ML
0.5 INJECTION, SOLUTION INTRAMUSCULAR; INTRAVENOUS; SUBCUTANEOUS ONCE
Status: COMPLETED | OUTPATIENT
Start: 2020-08-14 | End: 2020-08-14

## 2020-08-14 RX ORDER — HYDROCODONE BITARTRATE AND ACETAMINOPHEN 5; 325 MG/1; MG/1
1 TABLET ORAL EVERY 6 HOURS PRN
Qty: 5 TABLET | Refills: 0 | Status: SHIPPED | OUTPATIENT
Start: 2020-08-14 | End: 2020-11-10

## 2020-08-14 RX ORDER — PROPOFOL 10 MG/ML
VIAL (ML) INTRAVENOUS
Status: COMPLETED | OUTPATIENT
Start: 2020-08-14 | End: 2020-08-14

## 2020-08-14 RX ADMIN — PROPOFOL 40 MG: 10 INJECTION, EMULSION INTRAVENOUS at 14:47

## 2020-08-14 RX ADMIN — HYDROMORPHONE HYDROCHLORIDE 0.5 MG: 1 INJECTION, SOLUTION INTRAMUSCULAR; INTRAVENOUS; SUBCUTANEOUS at 14:37

## 2020-08-14 NOTE — ED NOTES
Patient was placed in face mask in first look. Patient was wearing facemask when I entered the room and throughout our encounter. I wore full protective equipment throughout this patient encounter including a face mask, and gloves. Hand hygiene was performed before donning protective equipment and after removal when leaving the room.       Bladimir Ruelas RN  08/14/20 [id number]

## 2020-08-14 NOTE — ED PROVIDER NOTES
EMERGENCY DEPARTMENT ENCOUNTER    CHIEF COMPLAINT  Chief Complaint: knee pain  History given by: patient  History limited by: nothing  Room Number: 37/37  PMD: Ja Forbes MD      HPI:  Pt is a 25 y.o. female who presents to the ED via EMS complaining of sudden onset of constant right knee pain this afternoon. Pt was sitting on the floor at work, when a dog sitting on her lap, accidentally stepping on her knee and dislocating it. Pt reports she has dislocated this knee twice before, and has seen Ortho for this. She has no other complaints at this time. Pt reports no recent fevers, cough, shortness of breath, loss of taste/smell, recent travel, or exposure to any known/suspected Covid-19 infections.    Pt does not present with complaints for COVID19. However, my scribe and I were both wearing masks throughout any patient interaction. I wore goggles    MEDICAL RECORD REVIEW  Pt was seen here in 2018 for pyelonephritis.    PAST MEDICAL HISTORY  Active Ambulatory Problems     Diagnosis Date Noted   • Headache, migraine 10/18/2016   • Migraine without aura with status migrainosus 10/22/2018     Resolved Ambulatory Problems     Diagnosis Date Noted   • No Resolved Ambulatory Problems     Past Medical History:   Diagnosis Date   • Anxiety    • Arthritis    • Kidney infection    • Kidney stone    • Migraine        PAST SURGICAL HISTORY  Past Surgical History:   Procedure Laterality Date   • NO PAST SURGERIES         FAMILY HISTORY  Family History   Problem Relation Age of Onset   • Migraines Paternal Aunt    • Stroke Paternal Grandfather        SOCIAL HISTORY  Social History     Socioeconomic History   • Marital status: Single     Spouse name: Not on file   • Number of children: Not on file   • Years of education: Not on file   • Highest education level: Not on file   Tobacco Use   • Smoking status: Never Smoker   • Smokeless tobacco: Never Used   Substance and Sexual Activity   • Alcohol use: Yes     Alcohol/week:  4.0 standard drinks     Types: 4 Glasses of wine per week     Comment: social   • Drug use: No   • Sexual activity: Defer       ALLERGIES  Patient has no known allergies.    REVIEW OF SYSTEMS  Review of Systems   Musculoskeletal:        (+) right knee pain     All systems reviewed and negative except for those discussed in HPI.    PHYSICAL EXAM  ED Triage Vitals [08/14/20 1310]   Temp Heart Rate Resp BP SpO2   98.2 °F (36.8 °C) 84 20 118/74 100 %      Temp src Heart Rate Source Patient Position BP Location FiO2 (%)   Oral -- -- -- --       Physical Exam   Constitutional: She is oriented to person, place, and time. She appears distressed (secondary to pain).   HENT:   Head: Normocephalic and atraumatic.   Eyes: Pupils are equal, round, and reactive to light. EOM are normal.   Neck: Normal range of motion.   Cardiovascular: Normal rate and regular rhythm.   Pulmonary/Chest: Effort normal and breath sounds normal. No respiratory distress.   Abdominal: There is no tenderness.   Musculoskeletal:   Laterally displaced patella   Neurological: She is alert and oriented to person, place, and time. She has normal sensation and normal strength.   Skin: Skin is warm and dry.   Psychiatric: Affect normal.   Nursing note and vitals reviewed.      LAB RESULTS  Lab Results (last 24 hours)     ** No results found for the last 24 hours. **          I ordered the above labs and reviewed the results.    RADIOLOGY  XR Knee 1 or 2 View Right   Final Result           I ordered the above noted radiological studies. Interpreted by radiologist.       PROCEDURES  Lower Extremity Dislocation  Date/Time: 8/14/2020 2:31 PM  Performed by: Aiden Casanova MD  Authorized by: Aiden Casanova MD   Consent: Verbal consent obtained.  Risks and benefits: risks, benefits and alternatives were discussed  Consent given by: patient  Patient understanding: patient states understanding of the procedure being performed  Patient consent: the patient's  understanding of the procedure matches consent given  Procedure consent: procedure consent matches procedure scheduled  Relevant documents: relevant documents present and verified  Test results: test results available and properly labeled  Site marked: the operative site was marked  Imaging studies: imaging studies available  Required items: required blood products, implants, devices, and special equipment available  Patient identity confirmed: verbally with patient and arm band  Injury location: knee  Location details: right knee  Injury type: lateral displacement.  Pre-procedure neurovascular assessment: neurovascularly intact  Pre-procedure distal perfusion: normal  Pre-procedure neurological function: normal  Pre-procedure range of motion: reduced    Anesthesia:  Local anesthesia used: no    Sedation:  Patient sedated: yes  Sedation type: moderate (conscious) sedation  Sedatives: propofol  Analgesia: hydromorphone  Sedation start date/time: 8/14/2020 2:48 PM  Vitals: Vital signs were monitored during sedation.    Post-procedure neurovascular assessment: post-procedure neurovascularly intact  Post-procedure distal perfusion: normal  Post-procedure neurological function: normal  Post-procedure range of motion: normal  Patient tolerance: Patient tolerated the procedure well with no immediate complications        PROGRESS AND CONSULTS    ED Course as of Aug 14 1550   Fri Aug 14, 2020   1526 15:26  Patient here with right patellar dislocation.  Patient states she is done this 3 times now.  Patient was given small amount of propofol and patella was relocated.  Patient placed in knee immobilizer and she has crutches at home.  Give small amount of pain medication and get her back to see Dr. Raissa Hodges.    [SL]      ED Course User Index  [SL] Aiden Casanova MD     0931- On initial exam, HR 84, O2 sat 100%, /74. Discussed with pt and family plan to obtain imaging knee, treat pain with Dilaudid, then sedate pt  and reduce knee. Pt understands and agrees with the plan, all questions answered. Ordered XR R Knee for further evaluation. Also ordered Dilaudid for symptom management.          MEDICAL DECISION MAKING  Results were reviewed/discussed with the patient and they were also made aware of online access. Pt also made aware that some labs, such as cultures, will not be resulted during ER visit and follow up with PMD is necessary.          DIAGNOSIS  Final diagnoses:   Dislocation of right patella, initial encounter       DISPOSITION  DISCHARGE    Patient discharged in stable condition.    Reviewed implications of results, diagnosis, meds, responsibility to follow up, warning signs and symptoms of possible worsening, potential complications and reasons to return to ER, including worsening pain.    Patient/Family voiced understanding of above instructions.    Discussed plan for discharge, as there is no emergent indication for admission. Patient referred to primary care provider for BP management due to today's BP. Pt/family is agreeable and understands need for follow up and repeat testing.  Pt is aware that discharge does not mean that nothing is wrong but it indicates no emergency is present that requires admission and they must continue care with follow-up as given below or physician of their choice.     FOLLOW-UP  Raissa Hodges MD  [address] [place] [address]  [phone]    Schedule an appointment as soon as possible for a visit            Medication List      New Prescriptions    HYDROcodone-acetaminophen 5-325 MG per tablet  Commonly known as:  NORCO  Take 1 tablet by mouth Every 6 (Six) Hours As Needed for Moderate Pain .              Latest Documented Vital Signs:  As of 15:50  BP- 116/63 HR- 79 Temp- 98.2 °F (36.8 °C) (Oral) O2 sat- 100%    --  Documentation assistance provided by ulisses Ramirez for Dr. Casanova.  Information recorded by the ulisses was done at my direction and has  been verified and validated by me.     Lisa Ramirez  08/14/20 1450       Aiden Casanova MD  08/14/20 7751

## 2020-08-18 ENCOUNTER — OFFICE VISIT (OUTPATIENT)
Dept: ORTHOPEDIC SURGERY | Facility: CLINIC | Age: 25
End: 2020-08-18

## 2020-08-18 VITALS — TEMPERATURE: 98.9 F | WEIGHT: 170 LBS | HEIGHT: 66 IN | BODY MASS INDEX: 27.32 KG/M2

## 2020-08-18 DIAGNOSIS — M25.561 RIGHT KNEE PAIN, UNSPECIFIED CHRONICITY: Primary | ICD-10-CM

## 2020-08-18 DIAGNOSIS — S83.004A DISLOCATION OF RIGHT PATELLA, INITIAL ENCOUNTER: ICD-10-CM

## 2020-08-18 DIAGNOSIS — S83.004A DISLOCATION OF RIGHT PATELLA, INITIAL ENCOUNTER: Primary | ICD-10-CM

## 2020-08-18 PROCEDURE — 73560 X-RAY EXAM OF KNEE 1 OR 2: CPT | Performed by: ORTHOPAEDIC SURGERY

## 2020-08-18 PROCEDURE — 99213 OFFICE O/P EST LOW 20 MIN: CPT | Performed by: ORTHOPAEDIC SURGERY

## 2020-08-18 NOTE — PROGRESS NOTES
New Right  Knee      Patient: Taty Chacko        YOB: 1995    Medical Record Number: [id number]        Chief Complaints: right knee pain      History of Present Illness: This is a 25-year-old female who presents complaining of right knee injury she was on the ground holding a dog as she works as a  and the dog stepped on her right patella and it dislocated she was unable to reduce it herself.  It is dislocated 3 times the first when she was 11 years old the second in high school and now about 9 years later.  She had left knee problems and I saw her for that in 2019 and that is doing great.  Currently her symptoms are a 5 out of 10 they are moderate constant stabbing aching bruising swelling worse with any range of motion somewhat better with ice and rest she is a  her past medical history is remarkable for depression anxiety        Allergies: No Known Allergies    Medications:   Home Medications:  Current Outpatient Medications on File Prior to Visit   Medication Sig   • ibuprofen (ADVIL,MOTRIN) 200 MG tablet Take  by mouth.   • topiramate (TOPAMAX) 50 MG tablet Take 1 tablet by mouth Every Night.   • Biotin 1000 MCG tablet Take 1,000 mcg by mouth Daily.   • FLUoxetine (PROzac) 20 MG capsule Take 20 mg by mouth Daily.   • HYDROcodone-acetaminophen (NORCO) 5-325 MG per tablet Take 1 tablet by mouth Every 6 (Six) Hours As Needed for Moderate Pain .   • hydrOXYzine (ATARAX) 25 MG tablet Take 25-50 mg by mouth.   • levonorgestrel (KYLEENA) 19.5 MG intrauterine device IUD 1 each by Intrauterine route 1 (One) Time.   • minocycline (MINOCIN,DYNACIN) 50 MG capsule Take 25 mg by mouth 2 (Two) Times a Day.   • Multiple Vitamins-Minerals (MULTIVITAMIN ADULT PO) Take  by mouth Daily.   • Probiotic Product (PROBIOTIC DAILY PO) Take  by mouth.   • rizatriptan (MAXALT) 10 MG tablet Take 1 tablet by mouth 1 (One) Time As Needed for Migraine for up to 1 dose.     No current facility-administered  "medications on file prior to visit.      Current Medications:  Scheduled Meds:  Continuous Infusions:  No current facility-administered medications for this visit.   PRN Meds:.    Past Medical History:   Diagnosis Date   • Anxiety    • Arthritis    • Kidney infection    • Kidney stone    • Migraine         Past Surgical History:   Procedure Laterality Date   • NO PAST SURGERIES          Social History     Occupational History   • Not on file   Tobacco Use   • Smoking status: Never Smoker   • Smokeless tobacco: Never Used   Substance and Sexual Activity   • Alcohol use: Yes     Alcohol/week: 4.0 standard drinks     Types: 4 Glasses of wine per week     Comment: social   • Drug use: No   • Sexual activity: Defer      Social History     Social History Narrative   • Not on file        Family History   Problem Relation Age of Onset   • Migraines Paternal Aunt    • Stroke Paternal Grandfather              Review of Systems: 14 point view of systems are remarkable for the knee pain only the remainder negative per the patient    Review of Systems      Physical Exam: 25 y.o. female  General Appearance:    Alert, cooperative, in no acute distress                   Vitals:    08/18/20 1307   Temp: 98.9 °F (37.2 °C)   Weight: 77.1 kg (170 lb)   Height: 167.6 cm (66\")   PainSc:   5      Patient is alert and read ×3 no acute distress appears her above-listed at height weight and age.  Affect is normal respiratory rate is normal unlabored. Heart rate regular rate rhythm, sclera, dentition and hearing are normal for the purpose of this exam.        Ortho Exam  Physical exam of the right knee she has mild effusion she does have a positive apprehension sign quads are firing but difficult to do an independent straight leg raise her extensor mechanism is intact calf is soft nontender no dedicated joint line tenderness  Procedures             Radiology:   AP, Lateral and merchant views of the right knee  were ordered/reviewed to " evauateknee pain.  I do have a few of these films to compare to from her left knee x-rays in 2019 patella appears to sit centrally within the trochlear groove she has a nice deep trochlear groove no acute pathology  Imaging Results (Most Recent)     Procedure Component Value Units Date/Time    XR Knee 1 or 2 View Bilateral [[id number]] Resulted:  08/18/20 1324     Updated:  08/18/20 1324    Impression:       Ordering physician's impression is located in the Encounter Note dated 08/18/20. X-ray performed in the DR room.          Assessment/Plan:      Right knee dislocation this is the third time I think it is time to get an MRI.  I am looking for fragment from the dislocation also looking at the integrity of the medial patellofemoral ligament I will show her a J brace which she like she can wean to that when she feels like she is ready wanted to work with her working on her quads just with straight leg raises and quad sets and I will see her back after her MRI

## 2020-08-19 ENCOUNTER — TELEPHONE (OUTPATIENT)
Dept: ORTHOPEDIC SURGERY | Facility: CLINIC | Age: 25
End: 2020-08-19

## 2020-08-27 ENCOUNTER — OFFICE VISIT (OUTPATIENT)
Dept: ORTHOPEDIC SURGERY | Facility: CLINIC | Age: 25
End: 2020-08-27

## 2020-08-27 VITALS — WEIGHT: 170.4 LBS | TEMPERATURE: 98 F | HEIGHT: 66 IN | BODY MASS INDEX: 27.38 KG/M2

## 2020-08-27 DIAGNOSIS — S83.004D DISLOCATION OF RIGHT PATELLA, SUBSEQUENT ENCOUNTER: Primary | ICD-10-CM

## 2020-08-27 PROCEDURE — 99213 OFFICE O/P EST LOW 20 MIN: CPT | Performed by: ORTHOPAEDIC SURGERY

## 2020-08-27 NOTE — PROGRESS NOTES
Right Knee Follow Up/MRI follow-up    Patient: Taty Chacko        YOB: 1995            Chief Complaints: right knee pain      History of Present Illness: Patient is here for right knee pain she is status post right patella dislocation para we did do an MRI which does not show a loose body normal as her an obvious MPFL tear she states she still hurting some she does like the J brace      Physical Exam: 25 y.o. female  General Appearance:    Alert, cooperative, in no acute distress                 There were no vitals filed for this visit.     Patient is alert and read ×3 no acute distress appears her above-listed at height weight and age.  Affect is normal respiratory rate is normal unlabored. Heart rate regular rate rhythm, sclera, dentition and hearing are normal for the purpose of this exam.      Ortho Exam     Physical exam of the right knee she has a lot of quad atrophy which I told her she would need to get that better and actually better than the average person and that would be quad and core which I do think is quite weak.  She has no effusion she has good range of motion calf soft nontender      MRIs as above I have reviewed the films myself and agree with the findings      Assessment/Plan:      Right knee dislocation there is not an obvious MPFL tear not an obvious loose body at this point I would like her to start into some aggressive physical therapy I will keep her off work.  If she finds out that work can accommodate her on light duty she will call me and we can get her on that otherwise I will see her back in 3 weeks

## 2020-08-28 ENCOUNTER — TELEPHONE (OUTPATIENT)
Dept: ORTHOPEDIC SURGERY | Facility: CLINIC | Age: 25
End: 2020-08-28

## 2020-08-28 DIAGNOSIS — S83.004D DISLOCATION OF RIGHT PATELLA, SUBSEQUENT ENCOUNTER: Primary | ICD-10-CM

## 2020-09-04 ENCOUNTER — TREATMENT (OUTPATIENT)
Dept: PHYSICAL THERAPY | Facility: CLINIC | Age: 25
End: 2020-09-04

## 2020-09-04 DIAGNOSIS — Z74.09 IMPAIRED FUNCTIONAL MOBILITY AND ACTIVITY TOLERANCE: ICD-10-CM

## 2020-09-04 DIAGNOSIS — M25.561 ACUTE PAIN OF RIGHT KNEE: Primary | ICD-10-CM

## 2020-09-04 DIAGNOSIS — S83.004D DISLOCATION OF RIGHT PATELLA, SUBSEQUENT ENCOUNTER: ICD-10-CM

## 2020-09-04 PROCEDURE — 97162 PT EVAL MOD COMPLEX 30 MIN: CPT | Performed by: PHYSICAL THERAPIST

## 2020-09-04 PROCEDURE — 97110 THERAPEUTIC EXERCISES: CPT | Performed by: PHYSICAL THERAPIST

## 2020-09-04 NOTE — PROGRESS NOTES
Physical Therapy Initial Evaluation and Plan of Care      Patient: Taty Chacko   : 1995  Diagnosis/ICD-10 Code:  Acute pain of right knee [M25.561]  Referring practitioner: Raissa Hodges MD  Date of Initial Visit: 2020  Today's Date: 2020  Patient seen for 1 sessions           Subjective Evaluation    History of Present Illness  Date of onset: 2020  Mechanism of injury: Pt is a . She was sitting on the floor with a dog. It got upset and when she moved, the dog stepped on her knee and it dislocated her patella. She has had 2 prior dislocations over the years that she was able to reduce on her own. She saw PT after the 2nd incident 11 years ago. She was unable to reduce this last dislocation. She is off for now until she is examined in 2 more weeks by MD. She is wearing a J-brace. She has not been driving      Patient Occupation:  Quality of life: excellent    Pain  Current pain ratin  At best pain rating: 3  At worst pain ratin  Location: R knee  Quality: dull ache and sharp  Relieving factors: ice and rest (alternating ibuprofen and Tylenol)  Aggravating factors: standing, stairs, ambulation, squatting, lifting and sleeping  Progression: no change    Social Support  Lives in: apartment (2nd floor)  Lives with: significant other    Treatments  Previous treatment: immobilization  Current treatment: medication  Current treatment comments: knee brace.     Patient Goals  Patient goals for therapy: decreased pain, increased motion, independence with ADLs/IADLs, return to work, return to sport/leisure activities and increased strength             Objective          Static Posture     Ankle/Foot   Ankle/Foot (Left): Pes planus.   Ankle/Foot (Right): Pes planus.     Observations     Right Knee   Positive for atrophy and edema.       Tenderness     Right Knee   Tenderness in the MCL (distal), MCL (proximal) and medial joint line.     Active Range of Motion   Left Knee    Flexion: 132 degrees   Extension: 10 (of HE) degrees     Right Knee   Flexion: 120 degrees   Extension: 2 (of HE) degrees   Extensor la degrees     Strength/Myotome Testing     Left Hip   Planes of Motion   Flexion: 4+  Abduction: 5  Adduction: 5  External rotation: 5    Right Hip   Planes of Motion   Flexion: 4  Abduction: 4-  Adduction: 4  External rotation: 4    Left Knee   Flexion: 4+  Extension: 5    Right Knee   Flexion: 3+  Extension: 3+        See Exercise, Manual, and Modality Logs for complete treatment.     Functional outcome score: Knee Outcome Survey=46.25%    Exercises:  -quad set 2x10 for 5 sec hold  -SLR and SL hip abd x10  -heel slide with strap 10x 10 sec  -seated hamstring curls, red band, x20  -hamstring and calf stretching 3x20 sec      Assessment & Plan     Assessment  Impairments: abnormal or restricted ROM, activity intolerance, impaired physical strength, lacks appropriate home exercise program, pain with function and weight-bearing intolerance  Assessment details: Taty Chacko is a 25 y.o. year-old female referred to physical therapy for right knee pain. She presents with a evolving clinical presentation.  She has comorbidities of 2 prior patellar dislocations over 10 years ago and personal factors of a physical job that may affect her progress in the plan of care.  Pt has decreased R knee ROM in ext=2 deg of HE and flex=120 deg, decreased strength 3+/5, and decreased flexibility of the HS and gastroc/soleus complex. She has B pes planus and would benefit from custom orthotics to help keep her knees in better alignment.  Pt would benefit from therapy to help improve her ability to walk, perform stairs, drive, and be able to get up/down from the floor without pain.  Prognosis: good  Functional Limitations: lifting, sleeping, walking, uncomfortable because of pain, sitting and standing  Goals  Plan Goals: ST wks  1. Patient will be independent with education for symptom  management, joint protection and strategies to minimize stress on affected tissues  2. Patient will increase knee strength by exhibiting no quad lag with SLR to reduce the buckling in her knee  3. Pt to improve knee ROM to 6 deg of HE to 128 deg for improved gait pattern  4. Pt to improve pain complaints to no more than 4/10 with ADLs and walking  5. Pt to be measured and fit for custom orthotics    LT wks  1. Pt to improve knee ROM 10 deg of HE to 132 deg to for improved gait pattern  2. Pt to improve score on Knee Outcome Survey from 46.25% to 85% for overall functional improvement  3. Patient will increase knee strength to 4+ to 5/5 to improve functional mobility  4. Patient will negotiate stairs reciprocally with rail support as needed  5. Pt able to get up and down from the floor without increased pain  6. Pt to be independent with wear pattern for her orthotics  7. Patient will demonstrate an independent HEP for core and knee strength and flexibility/ROM.        Plan  Therapy options: will be seen for skilled physical therapy services  Planned modality interventions: cryotherapy, ultrasound and TENS  Planned therapy interventions: ADL retraining, balance/weight-bearing training, flexibility, functional ROM exercises, gait training, home exercise program, IADL retraining, joint mobilization, manual therapy, motor coordination training, neuromuscular re-education, soft tissue mobilization, strengthening, stretching, therapeutic activities, transfer training, orthotic fitting/training and abdominal trunk stabilization  Frequency: 2x week  Duration in weeks: 20  Treatment plan discussed with: patient  Plan details: Plan to see pt 2x week for 6-8 weeks  Discuss with orthopedist about custom orthotics        Timed:  Manual Therapy:         mins  74231;  Therapeutic Exercise:    15     mins  88713;     Neuromuscular Cynthia:        mins  64250;    Therapeutic Activity:          mins  49401;     Gait Training:            mins  14863;     Ultrasound:          mins  03022;    Electrical Stimulation:         mins  96783 ( );  Iontophoresis         mins 01429  Dry Needling        mins      Untimed:  Electrical Stimulation:         mins  99980 ( );  Mechanical Traction:         mins  65106;     Timed Treatment:   15   mins   Total Treatment:     45   mins    PT SIGNATURE: Dia Austin, PT   DATE TREATMENT INITIATED: 9/4/2020    Initial Certification  Certification Period: 12/3/2020  I certify that the therapy services are furnished while this patient is under my care.  The services outlined above are required by this patient, and will be reviewed every 90 days.     PHYSICIAN: Raissa Hodges MD      DATE:     Please sign and return via fax to  .. Thank you, [facility].

## 2020-09-08 ENCOUNTER — TREATMENT (OUTPATIENT)
Dept: PHYSICAL THERAPY | Facility: CLINIC | Age: 25
End: 2020-09-08

## 2020-09-08 DIAGNOSIS — M25.561 ACUTE PAIN OF RIGHT KNEE: Primary | ICD-10-CM

## 2020-09-08 DIAGNOSIS — Z74.09 IMPAIRED FUNCTIONAL MOBILITY AND ACTIVITY TOLERANCE: ICD-10-CM

## 2020-09-08 DIAGNOSIS — S83.004D DISLOCATION OF RIGHT PATELLA, SUBSEQUENT ENCOUNTER: ICD-10-CM

## 2020-09-08 PROCEDURE — 97110 THERAPEUTIC EXERCISES: CPT | Performed by: PHYSICAL THERAPIST

## 2020-09-08 PROCEDURE — 97035 APP MDLTY 1+ULTRASOUND EA 15: CPT | Performed by: PHYSICAL THERAPIST

## 2020-09-08 NOTE — PROGRESS NOTES
Physical Therapy Daily Progress Note    Patient: Taty Chacko   : 1995  Diagnosis/ICD-10 Code:  Acute pain of right knee [M25.561]  Referring practitioner: Raissa Hodges MD  Date of Initial Visit: Type: THERAPY  Noted: 2020  Today's Date: 2020  Patient seen for 2 sessions           Subjective It is pretty sore today. I was up on it a lot on  at a family gathering. Have been icing and taking Advil and Tylenol alternating for pain    Objective   See Exercise, Manual, and Modality Logs for complete treatment.     Exercises:  -NuStep level 4, 6 min  -quad set 2x10 for 5 sec hold  -SLR and SL hip abd 2x10  -heel slide with strap 10x 10 sec  -hip add iso with ball, 2x10 for 5 sec hold  -bridge w/ball squeeze 2x10  -seated hamstring curls, red band, x20  -hamstring and calf stretching 3x20 sec    Assessment/Plan  Pt with tenderness and mild edema over the medial knee today. She keeps having the sensation that her knee wants to pop (that it might feel better if it did). Reviewed her HEP and added to mat program. Still with mild antalgia with gait. Trial of US for pain control and healing.          Timed:    Manual Therapy:         mins  66530;  Therapeutic Exercise:    33     mins  12435;     Neuromuscular Cynthia:        mins  57656;    Therapeutic Activity:          mins  52401;     Gait Training:           mins  88461;     Ultrasound:     10     mins  26164;    Electrical Stimulation:         mins  76704 ( );  Iontophoresis         mins 59641;  Aquatic Therapy         mins 11921;  Dry Needling                   mins    Untimed:  Electrical Stimulation:         mins  77452 ( );  Mechanical Traction:         mins  54943;     Timed Treatment:   43   mins   Total Treatment:     43   mins  Dia Austin, PT  Physical Therapist

## 2020-09-10 ENCOUNTER — TREATMENT (OUTPATIENT)
Dept: PHYSICAL THERAPY | Facility: CLINIC | Age: 25
End: 2020-09-10

## 2020-09-10 DIAGNOSIS — S83.004D DISLOCATION OF RIGHT PATELLA, SUBSEQUENT ENCOUNTER: ICD-10-CM

## 2020-09-10 DIAGNOSIS — M25.561 ACUTE PAIN OF RIGHT KNEE: Primary | ICD-10-CM

## 2020-09-10 DIAGNOSIS — Z74.09 IMPAIRED FUNCTIONAL MOBILITY AND ACTIVITY TOLERANCE: ICD-10-CM

## 2020-09-10 PROCEDURE — 97035 APP MDLTY 1+ULTRASOUND EA 15: CPT | Performed by: PHYSICAL THERAPIST

## 2020-09-10 PROCEDURE — 97110 THERAPEUTIC EXERCISES: CPT | Performed by: PHYSICAL THERAPIST

## 2020-09-10 NOTE — PROGRESS NOTES
Physical Therapy Daily Progress Note    Patient: Taty Chacko   : 1995  Diagnosis/ICD-10 Code:  Acute pain of right knee [M25.561]  Referring practitioner: No ref. provider found  Date of Initial Visit: Type: THERAPY  Noted: 2020  Today's Date: 9/10/2020  Patient seen for 3 sessions           Subjective It has been constantly aching this week, pain 4/10    Objective   See Exercise, Manual, and Modality Logs for complete treatment.     Exercises:  -NuStep level 4, 6 min  -quad set 2x10 for 5 sec hold  -SLR and SL hip abd 2x10  -heel slide with strap 10x 10 sec  -hip add iso with ball, 2x10 for 5 sec hold  -bridge w/ball squeeze 2x10  -seated hamstring curls, red band, x20  -TKE with red band, x20  -hamstring and calf stretching 3x20 sec    Assessment/Plan  Pt with some increased aching in the knee starting exercises this past week. She continues to be compliant with her brace wear and HEP. She had some sensitivity with US today, so reduced to 50% pulsed and that helped. Showed her patellar mobs for gently stretching her lateral side.          Timed:    Manual Therapy:         mins  85587;  Therapeutic Exercise:    33     mins  56000;     Neuromuscular Cynthia:        mins  70481;    Therapeutic Activity:          mins  40740;     Gait Training:           mins  48036;     Ultrasound:     10     mins  43521;    Electrical Stimulation:         mins  62658 ( );  Iontophoresis         mins 14592;  Aquatic Therapy         mins 73277;  Dry Needling                   mins    Untimed:  Electrical Stimulation:         mins  89788 ( );  Mechanical Traction:         mins  91568;     Timed Treatment:   43   mins   Total Treatment:     43   mins  Dia Austin, PT  Physical Therapist

## 2020-09-16 ENCOUNTER — TREATMENT (OUTPATIENT)
Dept: PHYSICAL THERAPY | Facility: CLINIC | Age: 25
End: 2020-09-16

## 2020-09-16 DIAGNOSIS — S83.004D DISLOCATION OF RIGHT PATELLA, SUBSEQUENT ENCOUNTER: ICD-10-CM

## 2020-09-16 DIAGNOSIS — M25.561 ACUTE PAIN OF RIGHT KNEE: Primary | ICD-10-CM

## 2020-09-16 DIAGNOSIS — Z74.09 IMPAIRED FUNCTIONAL MOBILITY AND ACTIVITY TOLERANCE: ICD-10-CM

## 2020-09-16 PROCEDURE — 97035 APP MDLTY 1+ULTRASOUND EA 15: CPT | Performed by: PHYSICAL THERAPIST

## 2020-09-16 PROCEDURE — 97110 THERAPEUTIC EXERCISES: CPT | Performed by: PHYSICAL THERAPIST

## 2020-09-16 NOTE — PROGRESS NOTES
Physical Therapy Daily Progress Note    Patient: Taty Chacko   : 1995  Diagnosis/ICD-10 Code:  Acute pain of right knee [M25.561]  Referring practitioner: Raissa Hodges MD  Date of Initial Visit: Type: THERAPY  Noted: 2020  Today's Date: 2020  Patient seen for 4 sessions           Subjective Pain 5/10 today. Stayed off of it over the weekend, but went to cook dinner and had increased pain up to 8/10 by the end of it.     Objective   See Exercise, Manual, and Modality Logs for complete treatment.     Exercises:  -NuStep level 4, 6 min  -[place] single leg press, 75 lb, x20  -quad set 2x10 for 5 sec hold  -SLR and SL hip abd 2x10  -heel slide with strap 10x 10 sec  -hip add iso with ball, 2x10 for 5 sec hold  -bridge w/ball squeeze 2x10  -seated hamstring curls, red band, x20  -TKE with red band, x20  -hip abd/ER with green band x20  -hamstring and calf stretching 3x20 sec    Assessment/Plan  Pt pain levels range from 5/10 at rest to 8/10 after standing or walking for a little longer period of time. She is still using the J-brace. She has improved her knee flexion to 122 deg and is now able to perform stairs reciprocally, but with some pain and compensation.          Timed:    Manual Therapy:         mins  66283;  Therapeutic Exercise:    35     mins  30583;     Neuromuscular Cynthia:        mins  01876;    Therapeutic Activity:          mins  93244;     Gait Training:           mins  54233;     Ultrasound:     8     mins  42237;    Electrical Stimulation:         mins  85198 ( );  Iontophoresis         mins 85489;  Aquatic Therapy         mins 95550;  Dry Needling                   mins    Untimed:  Electrical Stimulation:         mins  99713 ( );  Mechanical Traction:         mins  98849;     Timed Treatment:   43   mins   Total Treatment:     43   mins  Dia Austin, PT  Physical Therapist

## 2020-09-17 ENCOUNTER — OFFICE VISIT (OUTPATIENT)
Dept: ORTHOPEDIC SURGERY | Facility: CLINIC | Age: 25
End: 2020-09-17

## 2020-09-17 VITALS — BODY MASS INDEX: 27.32 KG/M2 | WEIGHT: 170 LBS | TEMPERATURE: 98.1 F | HEIGHT: 66 IN

## 2020-09-17 DIAGNOSIS — S83.004D DISLOCATION OF RIGHT PATELLA, SUBSEQUENT ENCOUNTER: Primary | ICD-10-CM

## 2020-09-17 PROCEDURE — 99212 OFFICE O/P EST SF 10 MIN: CPT | Performed by: ORTHOPAEDIC SURGERY

## 2020-09-17 NOTE — PROGRESS NOTES
Knee Follow Up      Patient: Taty Chacko        YOB: 1995            Chief Complaints: right  knee pain      History of Present Illness: Patient is here for right knee patellar dislocation she states is deftly getting better but still has pain if she is up on her leg for prolonged period in a prolonged period really is only on our right now.  She is not at work in order I feel like she is ready to return to work      Physical Exam: 25 y.o. female  General Appearance:    Alert, cooperative, in no acute distress                 There were no vitals filed for this visit.     Patient is alert and read ×3 no acute distress appears her above-listed at height weight and age.  Affect is normal respiratory rate is normal unlabored. Heart rate regular rate rhythm, sclera, dentition and hearing are normal for the purpose of this exam.      Ortho Exam     Exam of the right knee she has full extension extensor mechanism is intact J brace is on and well fitted negative apprehension sign calf soft nontender no overlying skin changes            Assessment/Plan:        Right knee patella dislocation I think she is doing fine at this point I will continue conservative management I will have her use the J brace continue to work on strengthening quad and core and I will keep her off work at this time

## 2020-09-18 ENCOUNTER — TREATMENT (OUTPATIENT)
Dept: PHYSICAL THERAPY | Facility: CLINIC | Age: 25
End: 2020-09-18

## 2020-09-18 DIAGNOSIS — M25.561 ACUTE PAIN OF RIGHT KNEE: Primary | ICD-10-CM

## 2020-09-18 DIAGNOSIS — Z74.09 IMPAIRED FUNCTIONAL MOBILITY AND ACTIVITY TOLERANCE: ICD-10-CM

## 2020-09-18 DIAGNOSIS — S83.004D DISLOCATION OF RIGHT PATELLA, SUBSEQUENT ENCOUNTER: ICD-10-CM

## 2020-09-18 PROCEDURE — 97110 THERAPEUTIC EXERCISES: CPT | Performed by: PHYSICAL THERAPIST

## 2020-09-18 NOTE — PROGRESS NOTES
Physical Therapy Daily Progress Note    Patient: Taty Chacko   : 1995  Diagnosis/ICD-10 Code:  Acute pain of right knee [M25.561]  Referring practitioner: Raissa Hodges MD  Date of Initial Visit: Type: THERAPY  Noted: 2020  Today's Date: 2020  Patient seen for 5 sessions           Subjective I am still off work for now. It is still bothering me being up on it for a period of time    Objective   See Exercise, Manual, and Modality Logs for complete treatment.     Exercises:  -NuStep level 4, 6 min (skipped today -machine in use)  -[place] single leg press, 75 lb, x20  -quad set 2x10 for 5 sec hold  -SLR and SL hip abd 2x10  -heel slide with strap 10x 10 sec  -hip add iso with ball, 2x10 for 5 sec hold  -bridge w/ball squeeze 2x10  -seated hamstring curls, red band, x20  -TKE with red band, x20  -hip abd/ER with green band x20, B and single leg  -fwd and lateral step ups, 2 inch, 2x10  -hamstring and calf stretching 3x20 sec    Manual:  KT to R knee with Y strip for the lateral subluxing patella and Y strip over the VMO for activation to the muscle    Assessment/Plan  Pt still continues with pain and soreness of the knee, especially when standing too long. Started with 2 inch step ups today, some discomfort with lateral more than forward. Did a trial of KT for improved stability         Timed:    Manual Therapy:    5     mins  91814;  Therapeutic Exercise:      35   mins  41529;     Neuromuscular Cynthia:        mins  60719;    Therapeutic Activity:          mins  75042;     Gait Training:           mins  09890;     Ultrasound:          mins  12439;    Electrical Stimulation:         mins  96937 ( );  Iontophoresis         mins 16453;  Aquatic Therapy         mins 23944;  Dry Needling                   mins    Untimed:  Electrical Stimulation:         mins  15587 ( );  Mechanical Traction:         mins  63838;     Timed Treatment:   40   mins   Total Treatment:     40   mins  Dia PACKER  Norman, PT  Physical Therapist

## 2020-09-22 ENCOUNTER — TREATMENT (OUTPATIENT)
Dept: PHYSICAL THERAPY | Facility: CLINIC | Age: 25
End: 2020-09-22

## 2020-09-22 DIAGNOSIS — S83.004D DISLOCATION OF RIGHT PATELLA, SUBSEQUENT ENCOUNTER: ICD-10-CM

## 2020-09-22 DIAGNOSIS — Z74.09 IMPAIRED FUNCTIONAL MOBILITY AND ACTIVITY TOLERANCE: ICD-10-CM

## 2020-09-22 DIAGNOSIS — M25.561 ACUTE PAIN OF RIGHT KNEE: Primary | ICD-10-CM

## 2020-09-22 PROCEDURE — 97110 THERAPEUTIC EXERCISES: CPT | Performed by: PHYSICAL THERAPIST

## 2020-09-22 PROCEDURE — 97140 MANUAL THERAPY 1/> REGIONS: CPT | Performed by: PHYSICAL THERAPIST

## 2020-09-22 NOTE — PROGRESS NOTES
Physical Therapy Daily Progress Note    Patient: Taty Chacko   : 1995  Diagnosis/ICD-10 Code:  Acute pain of right knee [M25.561]  Referring practitioner: Raissa Hodges MD  Date of Initial Visit: Type: THERAPY  Noted: 2020  Today's Date: 2020  Patient seen for 6 sessions           Subjective  Pt was in a wedding over the weekend. Sat and rested as much as she could, but by  it was really sore and swollen. Rested, elevated and iced on     Objective   See Exercise, Manual, and Modality Logs for complete treatment.     Exercises:  -NuStep level 4, 6 min  -Janneth single leg press, 75 lb, x20  -quad set 2x10 for 5 sec hold  -SLR and SL hip abd 2x10  -heel slide with strap 10x 10 sec  -hip add iso with ball, 2x10 for 5 sec hold  -bridge w/ball squeeze 2x10  -seated hamstring curls, red band, x20  -TKE with red band, x20  -hip abd/ER with green band x20, B and single leg  -fwd and lateral step ups, 3 inch, 2x10  -fwd step downs 3 in x20  -hamstring and calf stretching 3x20 sec    Manual:  Retrograde massage to distal quad, with ITBand and VL release    Assessment/Plan  Pt did well with the KT over the weekend. She was in a wedding on Sat night and sat down as much as she could. Had a little more puffiness around the patella that was helped with manual therapy along with a reduction of her pain         Timed:    Manual Therapy:    10     mins  60225;  Therapeutic Exercise:    35     mins  27096;     Neuromuscular Cynthia:        mins  14644;    Therapeutic Activity:          mins  48745;     Gait Training:           mins  08092;     Ultrasound:          mins  11430;    Electrical Stimulation:         mins  94489 ( );  Iontophoresis         mins 50310;  Aquatic Therapy         mins 18011;  Dry Needling                   mins    Untimed:  Electrical Stimulation:         mins  37584 ( );  Mechanical Traction:         mins  26286;     Timed Treatment:   45   mins   Total Treatment:      45   mins  Dia Austin, PT  Physical Therapist

## 2020-09-24 ENCOUNTER — TREATMENT (OUTPATIENT)
Dept: PHYSICAL THERAPY | Facility: CLINIC | Age: 25
End: 2020-09-24

## 2020-09-24 DIAGNOSIS — M25.561 ACUTE PAIN OF RIGHT KNEE: Primary | ICD-10-CM

## 2020-09-24 DIAGNOSIS — Z74.09 IMPAIRED FUNCTIONAL MOBILITY AND ACTIVITY TOLERANCE: ICD-10-CM

## 2020-09-24 DIAGNOSIS — S83.004D DISLOCATION OF RIGHT PATELLA, SUBSEQUENT ENCOUNTER: ICD-10-CM

## 2020-09-24 PROCEDURE — 97140 MANUAL THERAPY 1/> REGIONS: CPT | Performed by: PHYSICAL THERAPIST

## 2020-09-24 PROCEDURE — 97110 THERAPEUTIC EXERCISES: CPT | Performed by: PHYSICAL THERAPIST

## 2020-09-24 NOTE — PROGRESS NOTES
Physical Therapy Daily Progress Note    Patient: Taty Chacko   : 1995  Diagnosis/ICD-10 Code:  Acute pain of right knee [M25.561]  Referring practitioner: Raissa Hodges MD  Date of Initial Visit: Type: THERAPY  Noted: 2020  Today's Date: 2020  Patient seen for 7 sessions           Subjective It has been feeling a little better the last few days. Still pain at the front of the knee after standing too long    Objective   See Exercise, Manual, and Modality Logs for complete treatment.     Exercises:  -NuStep level 4, 5 min  -Janneth single leg press, 80 lb, x20  -quad set 2x10 for 5 sec hold  -SLR and SL hip abd 2x10  -bridge w/ball squeeze 2x10  -seated hamstring curls, red band, x20  -TKE with red band, x20  -hip abd/ER with green band x20, B and single leg  -fwd and lateral step ups, 3 inch, 2x10  -fwd step downs 3 in x20  -hamstring and calf stretching 3x20 sec     Manual:  Retrograde massage to distal quad, with ITBand and VL release    Assessment/Plan  Pt has been able to tolerate a little more activity this week in doing chores around her home. She is starting to perform the stairs reciprocally, but does have some pain at the patella tendon and PF joint.          Timed:    Manual Therapy:    8     mins  20739;  Therapeutic Exercise:    30     mins  54664;     Neuromuscular Cynthia:        mins  68822;    Therapeutic Activity:          mins  15251;     Gait Training:           mins  76889;     Ultrasound:          mins  97280;    Electrical Stimulation:         mins  22162 ( );  Iontophoresis         mins 83227;  Aquatic Therapy         mins 77027;  Dry Needling                   mins    Untimed:  Electrical Stimulation:         mins  29125 ( );  Mechanical Traction:         mins  22701;     Timed Treatment:   38   mins   Total Treatment:     38   mins  Dia Austin, PT  Physical Therapist

## 2020-09-29 ENCOUNTER — TREATMENT (OUTPATIENT)
Dept: PHYSICAL THERAPY | Facility: CLINIC | Age: 25
End: 2020-09-29

## 2020-09-29 DIAGNOSIS — S83.004D DISLOCATION OF RIGHT PATELLA, SUBSEQUENT ENCOUNTER: ICD-10-CM

## 2020-09-29 DIAGNOSIS — M25.561 ACUTE PAIN OF RIGHT KNEE: Primary | ICD-10-CM

## 2020-09-29 DIAGNOSIS — Z74.09 IMPAIRED FUNCTIONAL MOBILITY AND ACTIVITY TOLERANCE: ICD-10-CM

## 2020-09-29 PROCEDURE — 97110 THERAPEUTIC EXERCISES: CPT | Performed by: PHYSICAL THERAPIST

## 2020-09-29 NOTE — PROGRESS NOTES
Physical Therapy Daily Progress Note    Patient: Taty Chacko   : 1995  Diagnosis/ICD-10 Code:  Acute pain of right knee [M25.561]  Referring practitioner: Raissa Hodges MD  Date of Initial Visit: Type: THERAPY  Noted: 2020  Today's Date: 2020  Patient seen for 8 sessions           Subjective It is achier with with cooler weather today. Pain 6/10, had a sharp pain last night getting up from chair in kitchen but didn't last long.    Objective   See Exercise, Manual, and Modality Logs for complete treatment.     Exercises:  -NuStep level 4, 5 min  -[place] single leg press, 80 lb, x20  -quad set 2x10 for 5 sec hold  -SLR and SL hip abd 2x10  -bridge w/ball squeeze 2x10  -seated hamstring curls, red band, x20  -TKE with red band, x20  -hip abd/ER with green band x20, B and single leg  -fwd and lateral step ups, 4 inch, 2x10  -fwd step downs 4 in x10  -crouched side stepping, 2x10  -hamstring and calf stretching 3x20 sec       Assessment/Plan  Pt was standing up from the kitchen table after sitting for awhile, and had a sharp pain around the joint line behind the patellar tendon (feels deep to patient). It didn't last very long, but it has been aching all day today, medially. Pt with some pain with Edward's and Apley's compression for medial meniscus and with Bounce home test. She has been very compliant with her HEP, brace wear, and no strenuous activity. Will continue to monitor pain         Timed:    Manual Therapy:         mins  30819;  Therapeutic Exercise:    40     mins  80537;     Neuromuscular Cynthia:        mins  61171;    Therapeutic Activity:          mins  78659;     Gait Training:           mins  68881;     Ultrasound:          mins  51134;    Electrical Stimulation:         mins  40670 ( );  Iontophoresis         mins 74199;  Aquatic Therapy         mins 45850;  Dry Needling                   mins    Untimed:  Electrical Stimulation:         mins  96133 ( );  Mechanical  Traction:         mins  42608;     Timed Treatment:   40   mins   Total Treatment:     40   mins  Dia Austin, PT  Physical Therapist

## 2020-10-01 ENCOUNTER — TREATMENT (OUTPATIENT)
Dept: PHYSICAL THERAPY | Facility: CLINIC | Age: 25
End: 2020-10-01

## 2020-10-01 DIAGNOSIS — S83.004D DISLOCATION OF RIGHT PATELLA, SUBSEQUENT ENCOUNTER: ICD-10-CM

## 2020-10-01 DIAGNOSIS — Z74.09 IMPAIRED FUNCTIONAL MOBILITY AND ACTIVITY TOLERANCE: ICD-10-CM

## 2020-10-01 DIAGNOSIS — M25.561 ACUTE PAIN OF RIGHT KNEE: Primary | ICD-10-CM

## 2020-10-01 PROCEDURE — 97110 THERAPEUTIC EXERCISES: CPT | Performed by: PHYSICAL THERAPIST

## 2020-10-01 NOTE — PROGRESS NOTES
Physical Therapy Daily Progress Note    Patient: Taty Chacko   : 1995  Diagnosis/ICD-10 Code:  Acute pain of right knee [M25.561]  Referring practitioner: Raissa Hodges MD  Date of Initial Visit: Type: THERAPY  Noted: 2020  Today's Date: 10/1/2020  Patient seen for 9 sessions           Subjective I had to vacuum the apartment today and it makes it a little sore. Pain 4-5/10. Not as much of an ache as a couple of days ago    Objective   See Exercise, Manual, and Modality Logs for complete treatment.     Exercises:  -NuStep level 4, 5 min (defer - machine unavailable)   -Janneth single leg press, 80 lb, x20  -quad set 2x10 for 5 sec hold  -SLR and SL hip abd 2x10 1 lb  -prone hip ext x20 1 lb  -hip add x20  -sit to stand from mat x10  -bridge w/ball squeeze 2x10  -seated hamstring curls, red band, x20  -TKE with red band, x20  -hip abd/ER with green band x20, B and single leg  -fwd and lateral step ups, 4 inch, 2x10  -fwd step downs 4 in x10  -crouched side stepping, 2x10  -hamstring and calf stretching 3x20 sec    Assessment/Plan  Pt continues with pain medially, described as deep in the joint. Working on low level CKC activities now, with some increase in pain. Pt RTMD next week         Timed:    Manual Therapy:         mins  35941;  Therapeutic Exercise:    40     mins  82638;     Neuromuscular Cynthia:        mins  33135;    Therapeutic Activity:          mins  90516;     Gait Training:           mins  66967;     Ultrasound:          mins  49738;    Electrical Stimulation:         mins  43042 ( );  Iontophoresis         mins 67306;  Aquatic Therapy         mins 43206;  Dry Needling                   mins    Untimed:  Electrical Stimulation:         mins  62007 ( );  Mechanical Traction:         mins  07611;     Timed Treatment:   40   mins   Total Treatment:     40   mins  Dia Austin, PT  Physical Therapist

## 2020-10-06 ENCOUNTER — TREATMENT (OUTPATIENT)
Dept: PHYSICAL THERAPY | Facility: CLINIC | Age: 25
End: 2020-10-06

## 2020-10-06 DIAGNOSIS — S83.004D DISLOCATION OF RIGHT PATELLA, SUBSEQUENT ENCOUNTER: ICD-10-CM

## 2020-10-06 DIAGNOSIS — M25.561 ACUTE PAIN OF RIGHT KNEE: Primary | ICD-10-CM

## 2020-10-06 DIAGNOSIS — Z74.09 IMPAIRED FUNCTIONAL MOBILITY AND ACTIVITY TOLERANCE: ICD-10-CM

## 2020-10-06 PROCEDURE — 97110 THERAPEUTIC EXERCISES: CPT | Performed by: PHYSICAL THERAPIST

## 2020-10-06 NOTE — PROGRESS NOTES
30-Day / 10-Visit Progress Note         Patient: Taty Chacko   : 1995  Diagnosis/ICD-10 Code:  Acute pain of right knee [M25.561]  Referring practitioner: Raissa Hodges MD  Date of Initial Visit: Type: THERAPY  Noted: 2020  Today's Date: 10/6/2020  Patient seen for 10 sessions      Subjective:     Clinical Progress: improved, but slowly  Home Program Compliance: Yes  Treatment has included:  therapeutic exercise, manual therapy, ultrasound and patient education with home exercise program     Subjective Evaluation    Pain  Current pain rating: 3  At best pain rating: 3  At worst pain ratin  Quality: sharp and dull ache (sharp pains a couple of times a day, usually in the evening)         Objective          Tenderness     Right Knee   Tenderness in the medial joint line and patellar tendon.     Active Range of Motion     Right Knee   Flexion: 127 degrees   Extension: 4 (of HE) degrees     Strength/Myotome Testing     Left Hip   Planes of Motion   Flexion: 5  Abduction: 5  Adduction: 5  External rotation: 5    Right Hip   Planes of Motion   Flexion: 4+  Abduction: 4  Adduction: 5  External rotation: 4+    Left Knee   Flexion: 5  Extension: 5    Right Knee   Flexion: 4  Extension: 4    Tests     Right Knee   Positive Apley's compression, bounce home and medial Edward.   Negative lateral Edward.       See Exercise, Manual, and Modality Logs for complete treatment.     Functional outcome score: Knee Outcome Survey= 56.25%    Exercises:  -NuStep level 4, 5 min (defer - machine unavailable)   -Janneth single leg press, 80 lb, x20  -quad set 2x10 for 5 sec hold  -SLR and SL hip abd 2x10 1 lb  -prone hip ext x20 1 lb  -hip add x20  -sit to stand from mat x10  -bridge w/ball squeeze 2x10  -seated hamstring curls, green band, x20  -TKE with green band, x20  -hip abd/ER with blue band x20, B and single leg  -fwd and lateral step ups, 4 inch, 2x10  -fwd step downs 4 in x10  -crouched side stepping,  2x10  -hamstring and calf stretching 3x20 sec    Assessment & Plan     Assessment  Assessment details: Taty Chacko has been seen for 10 physical therapy sessions for R knee pain following patellar dislocation.  Treatment has included therapeutic exercise, manual therapy, therapeutic activity, gait training, ultrasound and patient education with home exercise program . Progress to physical therapy goals is fair. She has improved her strength and is starting to tolerate some CKC activity in therapy, but her pain increases up to 7/10 after extended standing/walking/household chores. She is able to perform the stairs in her home reciprocally, but is slow descending. She has pain across the medial joint line and pain with Edward's and Apley's compression for a possible medial meniscus injury. She may benefit from custom orthotics as well. She will benefit from continued skilled physical therapy to address remaining impairments and functional limitations.       Goals  Plan Goals: ST wks  1. Patient will be independent with education for symptom management, joint protection and strategies to minimize stress on affected tissues (PART MET)   2. Patient will increase knee strength by exhibiting no quad lag with SLR to reduce the buckling in her knee (MET)   3. Pt to improve knee ROM to 6 deg of HE to 128 deg for improved gait pattern (ONGOING)   4. Pt to improve pain complaints to no more than 4/10 with ADLs and walking (ONGOING)   5. Pt to be measured and fit for custom orthotics (ONGOING)     LT wks  1. Pt to improve knee ROM 10 deg of HE to 132 deg to for improved gait pattern (ONGOING)   2. Pt to improve score on Knee Outcome Survey from 46.25% to 85% for overall functional improvement (ONGOING) improved to 56.25%  3. Patient will increase knee strength to 4+ to 5/5 to improve functional mobility (ONGOING)   4. Patient will negotiate stairs reciprocally with rail support as needed (MET)   5. Pt able to get  up and down from the floor without increased pain  6. Pt to be independent with wear pattern for her orthotics (ONGOING)   7. Patient will demonstrate an independent HEP for core and knee strength and flexibility/ROM. (ONGOING)     Plan  Therapy options: will be seen for skilled physical therapy services  Frequency: 2x week  Duration in weeks: 8  Treatment plan discussed with: patient           Recommendations: Continue as planned  Timeframe: 2 months  Prognosis to achieve goals: good    PT Signature: Dia Austin, PT      Based upon review of the patient's progress and continued therapy plan, it is my medical opinion that Taty Chacko should continue physical therapy treatment at [facility]  [address]   [place] [address] [phone].    Signature: __________________________________  Raissa Hodges MD    Timed:  Manual Therapy:         mins  29108;  Therapeutic Exercise:    40     mins  80794;     Neuromuscular Cynthia:        mins  69319;    Therapeutic Activity:          mins  46340;     Gait Training:           mins  43522;     Ultrasound:          mins  80704;    Electrical Stimulation:         mins  64956 ( );  Iontophoresis         mins 23721;  Dry Needling                   mins    Untimed:  Electrical Stimulation:         mins  33425 ( );  Mechanical Traction:         mins  55040;     Timed Treatment:   40   mins   Total Treatment:     40   mins

## 2020-10-07 NOTE — PROGRESS NOTES
Right Knee Follow Up      Patient: Taty Chacko        YOB: 1995            Chief Complaints: right  knee pain      History of Present Illness: Patient is here follow-up of right knee pain she had a patella dislocation she is progressing with physical therapy work will actually let her go back and a little bit different capacity which she think she can do      Physical Exam: 25 y.o. female  General Appearance:    Alert, cooperative, in no acute distress                 There were no vitals filed for this visit.     Patient is alert and read ×3 no acute distress appears her above-listed at height weight and age.  Affect is normal respiratory rate is normal unlabored. Heart rate regular rate rhythm, sclera, dentition and hearing are normal for the purpose of this exam.      Ortho Exam     Exam of the right knee she has no effusion no redness she has good range of motion negative apprehension quads are improving            Assessment/Plan: Right patella dislocation I think she is good has to continue working on her quad and core strengthening, continue her J brace I will allow her to return to work with no stooping, no lifting more than 20 pounds I will have her work half days for the next 2 weeks and then progress to full days I will see her back 3 to 4 weeks

## 2020-10-08 ENCOUNTER — OFFICE VISIT (OUTPATIENT)
Dept: ORTHOPEDIC SURGERY | Facility: CLINIC | Age: 25
End: 2020-10-08

## 2020-10-08 ENCOUNTER — TREATMENT (OUTPATIENT)
Dept: PHYSICAL THERAPY | Facility: CLINIC | Age: 25
End: 2020-10-08

## 2020-10-08 VITALS — BODY MASS INDEX: 27.77 KG/M2 | HEIGHT: 66 IN | TEMPERATURE: 98.2 F | WEIGHT: 172.8 LBS

## 2020-10-08 DIAGNOSIS — Z74.09 IMPAIRED FUNCTIONAL MOBILITY AND ACTIVITY TOLERANCE: ICD-10-CM

## 2020-10-08 DIAGNOSIS — S83.004D DISLOCATION OF RIGHT PATELLA, SUBSEQUENT ENCOUNTER: ICD-10-CM

## 2020-10-08 DIAGNOSIS — M25.561 ACUTE PAIN OF RIGHT KNEE: Primary | ICD-10-CM

## 2020-10-08 DIAGNOSIS — S83.004D DISLOCATION OF RIGHT PATELLA, SUBSEQUENT ENCOUNTER: Primary | ICD-10-CM

## 2020-10-08 PROCEDURE — 99212 OFFICE O/P EST SF 10 MIN: CPT | Performed by: ORTHOPAEDIC SURGERY

## 2020-10-08 PROCEDURE — 97110 THERAPEUTIC EXERCISES: CPT | Performed by: PHYSICAL THERAPIST

## 2020-10-08 NOTE — PROGRESS NOTES
Physical Therapy Daily Progress Note    Patient: Taty Chacko   : 1995  Diagnosis/ICD-10 Code:  Acute pain of right knee [M25.561]  Referring practitioner: Raissa Hodges MD  Date of Initial Visit: Type: THERAPY  Noted: 2020  Today's Date: 10/8/2020  Patient seen for 11 sessions           Subjective Pt was released to go back to work for half days, light duty. No squatting, stooping, or lifting greater than 20lb     Objective   See Exercise, Manual, and Modality Logs for complete treatment.     Exercises:  -NuStep level 5, 5 min   -[place] single leg press, 100 lb, x20  -SLR and SL hip abd 2x10 1 lb  -prone hip ext x20 1 lb  -hip add x20 1 lb  -sit to stand from mat x10  -bridge w/ball squeeze 2x10  -seated hamstring curls, green band, x20  -TKE with green band, x20  -hip abd/ER with blue band x20, B and single leg  -fwd and lateral step ups, 4 inch, 2x10  -fwd step downs 4 in x10  -crouched side stepping, 2x10 yellow band  -square and todd stepping, yellow band, 3x each way  -hamstring and calf stretching 3x20 sec    Assessment/Plan  Pt has been released for light duty at work. She cannot lift more than 20lb and should avoid squatting and stooping. Trying to increase her tolerance to [facility] activities. She is going to start a daily walking program to build up her tolerance and endurance         Timed:    Manual Therapy:         mins  23205;  Therapeutic Exercise:    40     mins  53329;     Neuromuscular Cynthia:        mins  64917;    Therapeutic Activity:          mins  02174;     Gait Training:           mins  98067;     Ultrasound:          mins  77718;    Electrical Stimulation:         mins  55490 ( );  Iontophoresis         mins 24718;  Aquatic Therapy         mins 98689;  Dry Needling                   mins    Untimed:  Electrical Stimulation:         mins  07241 ( );  Mechanical Traction:         mins  97121;     Timed Treatment:   40   mins   Total Treatment:     40   mins  Dia  OCHOA Austin, PT  Physical Therapist

## 2020-10-13 ENCOUNTER — TREATMENT (OUTPATIENT)
Dept: PHYSICAL THERAPY | Facility: CLINIC | Age: 25
End: 2020-10-13

## 2020-10-13 DIAGNOSIS — M25.561 ACUTE PAIN OF RIGHT KNEE: Primary | ICD-10-CM

## 2020-10-13 DIAGNOSIS — S83.004D DISLOCATION OF RIGHT PATELLA, SUBSEQUENT ENCOUNTER: ICD-10-CM

## 2020-10-13 DIAGNOSIS — Z74.09 IMPAIRED FUNCTIONAL MOBILITY AND ACTIVITY TOLERANCE: ICD-10-CM

## 2020-10-13 PROCEDURE — 97530 THERAPEUTIC ACTIVITIES: CPT | Performed by: PHYSICAL THERAPIST

## 2020-10-13 PROCEDURE — 97110 THERAPEUTIC EXERCISES: CPT | Performed by: PHYSICAL THERAPIST

## 2020-10-13 NOTE — PROGRESS NOTES
30-Day / 10-Visit Progress Note         Patient: Taty Chacko   : 1995  Diagnosis/ICD-10 Code:  Acute pain of right knee [M25.561]  Referring practitioner: Raissa Hodges MD  Date of Initial Visit: Type: THERAPY  Noted: 2020  Today's Date: 10/13/2020  Patient seen for 12 sessions      Subjective:     Clinical Progress: improved  Home Program Compliance: Yes  Treatment has included:  therapeutic exercise, manual therapy, therapeutic activity, gait training, ultrasound and patient education with home exercise program     Subjective Evaluation    Pain  Current pain ratin  At best pain rating: 3  At worst pain ratin  Quality: dull ache and sharp (occasional sharp pains)       Pt started back to work yesterday. 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     Functional outcome score: Knee Outcome Survey = 65%    Exercises:  -NuStep level 5, 5 min   -Janneth single leg press, 100 lb, x20  -SLR and SL hip abd 2x10 1 lb  -prone hip ext x20 1 lb  -hip add x20 1 lb  -sit to stand from mat x10  -bridge w/ball squeeze 2x10  -seated hamstring curls, green band, x20  -TKE with green band, x20  -hip abd/ER with blue band x20, B and single leg  -fwd and lateral step ups, 6 inch, 2x10  -fwd step downs 6 in x10  -crouched side stepping, 2x10 yellow band  -square and todd stepping, yellow band, 3x each way  -hamstring and calf stretching 3x20 sec    Assessment & Plan     Assessment  Assessment details: Taty Chacko has been seen for 12 physical therapy sessions for R knee pain .  Treatment has included therapeutic exercise, manual therapy, therapeutic activity, gait training, ultrasound and patient education with home exercise program . Progress to physical therapy goals is good. She has improved her ROM to 4 deg of HE to 127 deg, hip strength to 4 to 4+/5, and knee strength to 4/5. She continues with  some deep pain in the knee, up to 5-6/10. She started back to light duty yesterday at work and is pretty sore today. She is progressing well with her strength and overall function. She still has difficulty with squatting, stairs, and cannot kneel.  She will benefit from continued skilled physical therapy to address remaining impairments and functional limitations.     Prognosis: good    Goals  Plan Goals: ST wks  1. Patient will be independent with education for symptom management, joint protection and strategies to minimize stress on affected tissues (PART MET)   2. Patient will increase knee strength by exhibiting no quad lag with SLR to reduce the buckling in her knee (MET)   3. Pt to improve knee ROM to 6 deg of HE to 128 deg for improved gait pattern (ONGOING)   4. Pt to improve pain complaints to no more than 4/10 with ADLs and walking (ONGOING)   5. Pt to be measured and fit for custom orthotics (ONGOING)     LT wks  1. Pt to improve knee ROM 10 deg of HE to 132 deg to for improved gait pattern (ONGOING)   2. Pt to improve score on Knee Outcome Survey from 46.25% to 85% for overall functional improvement (ONGOING) improved to 65%  3. Patient will increase knee strength to 4+ to 5/5 to improve functional mobility (ONGOING)   4. Patient will negotiate stairs reciprocally with rail support as needed (MET)   5. Pt able to get up and down from the floor without increased pain (ONGOING)   6. Pt to be independent with wear pattern for her orthotics (ONGOING)   7. Patient will demonstrate an independent HEP for core and knee strength and flexibility/ROM. (ONGOING)     Plan  Therapy options: will be seen for skilled physical therapy services  Frequency: 2x week  Duration in weeks: 6  Treatment plan discussed with: patient           Recommendations: Continue as planned  Timeframe: 6 weeks  Prognosis to achieve goals: good    PT Signature: Dia Austin, PT      Based upon review of the patient's progress  and continued therapy plan, it is my medical opinion that Taty Chacko should continue physical therapy treatment at [facility]  [address] [place] [address] [phone].    Signature: __________________________________  Raissa Hodges MD    Timed:  Manual Therapy:         mins  04189;  Therapeutic Exercise:    30     mins  60672;     Neuromuscular Cynthia:        mins  91068;    Therapeutic Activity:     10     mins  88178;     Gait Training:           mins  11727;     Ultrasound:          mins  36026;    Electrical Stimulation:         mins  47463 ( );  Iontophoresis         mins 00824;  Dry Needling                   mins    Untimed:  Electrical Stimulation:         mins  68072 ( );  Mechanical Traction:         mins  82685;     Timed Treatment:   40   mins   Total Treatment:     40   mins

## 2020-10-14 ENCOUNTER — TELEPHONE (OUTPATIENT)
Dept: ORTHOPEDIC SURGERY | Facility: CLINIC | Age: 25
End: 2020-10-14

## 2020-10-14 DIAGNOSIS — S83.004A DISLOCATION OF RIGHT PATELLA, INITIAL ENCOUNTER: Primary | ICD-10-CM

## 2020-10-15 ENCOUNTER — TREATMENT (OUTPATIENT)
Dept: PHYSICAL THERAPY | Facility: CLINIC | Age: 25
End: 2020-10-15

## 2020-10-15 DIAGNOSIS — S83.004D DISLOCATION OF RIGHT PATELLA, SUBSEQUENT ENCOUNTER: ICD-10-CM

## 2020-10-15 DIAGNOSIS — Z74.09 IMPAIRED FUNCTIONAL MOBILITY AND ACTIVITY TOLERANCE: ICD-10-CM

## 2020-10-15 DIAGNOSIS — M25.561 ACUTE PAIN OF RIGHT KNEE: Primary | ICD-10-CM

## 2020-10-15 PROCEDURE — 97110 THERAPEUTIC EXERCISES: CPT | Performed by: PHYSICAL THERAPIST

## 2020-10-15 NOTE — PROGRESS NOTES
Physical Therapy Daily Progress Note    Patient: Taty Chacko   : 1995  Diagnosis/ICD-10 Code:  Acute pain of right knee [M25.561]  Referring practitioner: Raissa Hodges MD  Date of Initial Visit: Type: THERAPY  Noted: 2020  Today's Date: 10/15/2020  Patient seen for 13 sessions           Subjective I am off for the next few days. It has been pretty sore this week.     Objective   See Exercise, Manual, and Modality Logs for complete treatment.     Exercises:  -NuStep level 5, 5 min   -Janneth single leg press, 100 lb, x20  -Janneth hip abd 50lb x20  -[place] hip add, 80 lb 2x10  -SLR and SL hip abd 2x10 1 lb  -prone hip ext x20 1 lb  -hip add x20 1 lb  -sit to stand from mat x10  -bridge w/ball squeeze 2x10  -seated hamstring curls, green band, x20  -TKE with green band, x20  -hip abd/ER with blue band x20, B and single leg  -fwd and lateral step ups, 6 inch, 2x10  -fwd step downs 6 in x10  -crouched side stepping, 2x10 green band  -square and todd stepping, green band, 3x each way  -hamstring and calf stretching 3x20 sec    Assessment/Plan  Pt has been a little more sore this week starting back to work. She isn't working full days yet and is on modified duty. 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 She is off for the weekend and is going to ice and elevate more         Timed:    Manual Therapy:         mins  37163;  Therapeutic Exercise:    40     mins  15373;     Neuromuscular Cynthia:        mins  91613;    Therapeutic Activity:          mins  11104;     Gait Training:           mins  17086;     Ultrasound:          mins  38743;    Electrical Stimulation:         mins  84546 ( );  Iontophoresis         mins 66999;  Aquatic Therapy         mins 54831;  Dry Needling                   mins    Untimed:  Electrical Stimulation:         mins  30623 ( );  Mechanical Traction:         mins  09301;     Timed Treatment:   40    mins   Total Treatment:     40   mins  Dia Austin, PT  Physical Therapist

## 2020-10-16 DIAGNOSIS — S83.006D PATELLAR DISLOCATION, UNSPECIFIED LATERALITY, SUBSEQUENT ENCOUNTER: Primary | ICD-10-CM

## 2020-10-19 ENCOUNTER — TREATMENT (OUTPATIENT)
Dept: PHYSICAL THERAPY | Facility: CLINIC | Age: 25
End: 2020-10-19

## 2020-10-19 DIAGNOSIS — Z74.09 IMPAIRED FUNCTIONAL MOBILITY AND ACTIVITY TOLERANCE: ICD-10-CM

## 2020-10-19 DIAGNOSIS — M25.561 ACUTE PAIN OF RIGHT KNEE: Primary | ICD-10-CM

## 2020-10-19 DIAGNOSIS — R26.2 DIFFICULTY WALKING: ICD-10-CM

## 2020-10-19 DIAGNOSIS — S83.004D DISLOCATION OF RIGHT PATELLA, SUBSEQUENT ENCOUNTER: ICD-10-CM

## 2020-10-19 PROCEDURE — 97110 THERAPEUTIC EXERCISES: CPT | Performed by: PHYSICAL THERAPIST

## 2020-10-19 NOTE — PROGRESS NOTES
Physical Therapy Daily Progress Note    Patient: Taty Chacko   : 1995  Diagnosis/ICD-10 Code:  Acute pain of right knee [M25.561]  Referring practitioner: Raissa Hodges MD  Date of Initial Visit: Type: THERAPY  Noted: 2020  Today's Date: 10/19/2020  Patient seen for 14 sessions           Subjective Evaluation    History of Present Illness    Subjective comment: went back to work last week light duty and only half days but still sore and icing more and taking a bit more ibuprofen. admits she still struggles with hyperextension even in her brace but trying to be more aware however her dance background makes this tough       Objective   See Exercise, Manual, and Modality Logs for complete treatment.   Exercises:  -NuStep level 5, 5 min   -Janneth single leg press, 105 lb, 2x20  -Janneth hip abd 50lb x20  -Janneth hip add, 80 lb 2x10  -SLR and SL hip abd 2x15 1 lb  -prone hip ext 2x15 1 lb (pillow under stomach, knee to chest stretch afterwards)  -hip add 2x15 1 lb  -sit to stand from mat x10 deferred  -bridge w/ball squeeze 2x15  -seated hamstring curls, green band, x20  -TKE with green band, x20 (use rx table to block hyperextension)  -hip abd/ER with green band x20, B and single leg  -fwd and lateral step ups, 6 inch, 2x10  -fwd step downs 6 in x10 (toe hanging off edge to decrease knee flexion)  -crouched side stepping, 2x10 blue band above knee  -square and todd stepping, blue band above knee, 3x each way  -hamstring and calf stretching 3x20 sec  -SLS on neurocom 30 sec. More tremor L LE secondary to old ankle sprain/pronation  -hurdles fwd and side stepping, 2 laps  -C leg curl. Next  -SLS on foam. Next    Assessment/Plan        Apprehensive about dislocating again but agrees she needs to get stronger and more confident.   P: progress to single leg balance work and resistive TKEs and leg curls on machines.       Timed:    Manual Therapy:         mins  73924;  Therapeutic Exercise:    45     mins   47842;     Neuromuscular Cynthia:        mins  42321;    Therapeutic Activity:          mins  07349;     Gait Training:           mins  02977;     Ultrasound:          mins  52043;    Electrical Stimulation:         mins  56051 ( );  Iontophoresis         mins 60941;  Aquatic Therapy         mins 32328;  Dry Needling                   mins    Untimed:  Electrical Stimulation:         mins  58518 ( );  Mechanical Traction:         mins  25614;     Timed Treatment:   45   mins   Total Treatment:     45   mins  Zorre Zeno Kimura, PT  Physical Therapist

## 2020-10-23 ENCOUNTER — TREATMENT (OUTPATIENT)
Dept: PHYSICAL THERAPY | Facility: CLINIC | Age: 25
End: 2020-10-23

## 2020-10-23 DIAGNOSIS — R26.2 DIFFICULTY WALKING: ICD-10-CM

## 2020-10-23 DIAGNOSIS — Z74.09 IMPAIRED FUNCTIONAL MOBILITY AND ACTIVITY TOLERANCE: ICD-10-CM

## 2020-10-23 DIAGNOSIS — S83.004D DISLOCATION OF RIGHT PATELLA, SUBSEQUENT ENCOUNTER: ICD-10-CM

## 2020-10-23 DIAGNOSIS — M25.561 ACUTE PAIN OF RIGHT KNEE: Primary | ICD-10-CM

## 2020-10-23 PROCEDURE — 97110 THERAPEUTIC EXERCISES: CPT | Performed by: PHYSICAL THERAPIST

## 2020-10-23 NOTE — PROGRESS NOTES
Physical Therapy Daily Progress Note    Patient: Taty Chacko   : 1995  Diagnosis/ICD-10 Code:  Acute pain of right knee [M25.561]  Referring practitioner: Raissa Hodges MD  Date of Initial Visit: Type: THERAPY  Noted: 2020  Today's Date: 10/23/2020  Patient seen for 15 sessions           Subjective Evaluation    History of Present Illness    Subjective comment: feeling stronger. trying to not hyperextend. admits she still feels unstable and insecure so agrees she needs to work on stability       Objective   See Exercise, Manual, and Modality Logs for complete treatment.     Objective   See Exercise, Manual, and Modality Logs for complete treatment.   Exercises:  -NuStep level 5, 5 min defer  -upright bike end of session. Seat 9 level 2 10 min  -Janneth single leg press, 110 lb, 2x20  -[place] hip abd 50lb x20 defer  -machine D hip abd 50# 25x  -Janneth hip add, 80 lb 2x15  -SLR and SL hip abd 2x15 2 lb  -prone hip ext 2x15 2 lb (pillow under stomach, knee to chest stretch afterwards)  -hip add 2x15 2 lb  -cable 3 way flex ext adduction. 2.5# 15 each (opp leg on step)  -cable walk outs back and side. 12.5#, 5 each way (strap around waist)  -sit to stand from mat x10 deferred  -ball squats 15x at wall  -bridge w/ball squeeze 2x15 deferred  -seated hamstring curls, green band, x20 def  -C leg curl 10# 30x  -TKE with green band, x20 (use rx table to block hyperextension)defer  -TKE cable 12.5# 25x  -hip abd/ER with green band x20, B and single leg. defer  -fwd and lateral step ups, 6 inch, 2x10  -fwd step downs 6 in x10 (toe hanging off edge to decrease knee flexion). defer  -crouched side stepping, 2x10 green band above knee  -square and todd stepping, green band above knee, 3x each way  -hamstring and calf stretching 3x20 sec  -MIP/SLS on blue foam. 20x slow holds  -hurdles fwd and side stepping, 2 laps      Assessment & Plan     Assessment  Assessment details: Progressing well transitioning to more  cable wt and balance ex to help with confidence and stability.   P: keep adding more functional challenges as strength improves.   Encourage some longer walks with slight hills as tolerated.                Timed:    Manual Therapy:         mins  63398;  Therapeutic Exercise:    45     mins  00243;     Neuromuscular Cynthia:    15    mins  92382;    Therapeutic Activity:          mins  16529;     Gait Training:           mins  08490;     Ultrasound:          mins  68207;    Electrical Stimulation:         mins  78991 ( );  Iontophoresis         mins 31813;  Aquatic Therapy         mins 44617;  Dry Needling                   mins    Untimed:  Electrical Stimulation:         mins  04045 ( );  Mechanical Traction:         mins  08515;     Timed Treatment:   60   mins   Total Treatment:     60   mins  Zorre Zeno Kimura, PT  Physical Therapist

## 2020-10-27 ENCOUNTER — TREATMENT (OUTPATIENT)
Dept: PHYSICAL THERAPY | Facility: CLINIC | Age: 25
End: 2020-10-27

## 2020-10-27 DIAGNOSIS — Z74.09 IMPAIRED FUNCTIONAL MOBILITY AND ACTIVITY TOLERANCE: ICD-10-CM

## 2020-10-27 DIAGNOSIS — R26.2 DIFFICULTY WALKING: ICD-10-CM

## 2020-10-27 DIAGNOSIS — S83.004D DISLOCATION OF RIGHT PATELLA, SUBSEQUENT ENCOUNTER: ICD-10-CM

## 2020-10-27 DIAGNOSIS — M25.561 ACUTE PAIN OF RIGHT KNEE: Primary | ICD-10-CM

## 2020-10-27 PROCEDURE — 97110 THERAPEUTIC EXERCISES: CPT | Performed by: PHYSICAL THERAPIST

## 2020-10-27 PROCEDURE — 97530 THERAPEUTIC ACTIVITIES: CPT | Performed by: PHYSICAL THERAPIST

## 2020-10-27 NOTE — PROGRESS NOTES
Physical Therapy Daily Progress Note    Patient: Taty Chacko   : 1995  Diagnosis/ICD-10 Code:  Acute pain of right knee [M25.561]  Referring practitioner: CORDELL Hodges  Date of Initial Visit: Type: THERAPY  Noted: 2020  Today's Date: 10/27/2020  Patient seen for 16 sessions           Subjective I am now working 8 hours. It is sore, still don't trust it at times    Objective   See Exercise, Manual, and Modality Logs for complete treatment.     Exercises:  -upright bike seat 3, 10 min  -Janneth single leg press, 120 lb, 2x20  -machine D hip abd 50# 25  -matrix hip add 55 lb 2x15  -cable 3 way flex ext adduction. 2.5# 15 each (opp leg on step)  -cable walk outs back and side. 12.5#, 5 each way (strap around waist)  -ball squats 15x at wall  -C leg curl 10# 30x  -TKE with green band, x20 (use rx table to block hyperextension)defer  -TKE cable 12.5# 25x (defer)  -hip abd/ER with green band x20, B and single leg. defer  -fwd and lateral step ups, 6 inch, 2x10 defer  -fwd step downs 6 in x10 (toe hanging off edge to decrease knee flexion). defer  -rockerboard M/L and center balance  -SLS with toe touch on balance discs x10  -hurdles fwd and side stepping, 2 laps    Assessment/Plan  Pt strength is improving and integrating more balance activities into her program. She still has feelings of instability of the knee, not comfortable with squatting or unable to kneel on the knee. Soreness with too much standing         Timed:    Manual Therapy:         mins  20939;  Therapeutic Exercise:    30     mins  81908;     Neuromuscular Cynthia:        mins  54783;    Therapeutic Activity:     15     mins  45997;     Gait Training:           mins  65108;     Ultrasound:          mins  54355;    Electrical Stimulation:         mins  68746 ( );  Iontophoresis         mins 42223;  Aquatic Therapy         mins 49659;  Dry Needling                   mins    Untimed:  Electrical Stimulation:         mins  31365 (  );  Mechanical Traction:         mins  56422;     Timed Treatment:   45   mins   Total Treatment:     45   mins  Dia Austin, PT  Physical Therapist

## 2020-10-29 ENCOUNTER — TREATMENT (OUTPATIENT)
Dept: PHYSICAL THERAPY | Facility: CLINIC | Age: 25
End: 2020-10-29

## 2020-10-29 DIAGNOSIS — M25.561 ACUTE PAIN OF RIGHT KNEE: Primary | ICD-10-CM

## 2020-10-29 DIAGNOSIS — Z74.09 IMPAIRED FUNCTIONAL MOBILITY AND ACTIVITY TOLERANCE: ICD-10-CM

## 2020-10-29 DIAGNOSIS — R26.2 DIFFICULTY WALKING: ICD-10-CM

## 2020-10-29 DIAGNOSIS — S83.004D DISLOCATION OF RIGHT PATELLA, SUBSEQUENT ENCOUNTER: ICD-10-CM

## 2020-10-29 PROCEDURE — 97112 NEUROMUSCULAR REEDUCATION: CPT | Performed by: PHYSICAL THERAPIST

## 2020-10-29 PROCEDURE — 97110 THERAPEUTIC EXERCISES: CPT | Performed by: PHYSICAL THERAPIST

## 2020-10-29 NOTE — PROGRESS NOTES
Physical Therapy Daily Progress Note    Patient: Taty Chacko   : 1995  Diagnosis/ICD-10 Code:  Acute pain of right knee [M25.561]  Referring practitioner: Dr. Raissa Hodges  Date of Initial Visit: Type: THERAPY  Noted: 2020  Today's Date: 10/29/2020  Patient seen for 17 sessions           Subjective It has been pretty sore. This is my last day working of the week. Ready to rest it this weekend.     Objective   See Exercise, Manual, and Modality Logs for complete treatment.     Exercises:  -upright bike seat 3, 10 min  -Janneth single leg press, 120 lb, 2x20  -machine D hip abd 50# 25  -matrix hip add 55 lb 2x15  -cable 3 way flex ext adduction. 2.5# 15 each (opp leg on step)  -cable walk outs back and side. 12.5#, 5 each way (strap around waist)  -ball squats 15x at wall  -C leg curl 15# 30x  -TKE with green band, x20   -hip abd/ER with green band x20, B and single leg. defer  -fwd and lateral step ups, 6 inch, 2x10 defer  -fwd step downs 6 in x10 (toe hanging off edge to decrease knee flexion). 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 She had one incident this week where a large dog jumped on her. She was sore after. She feels stable with the brace on for the most part, but when it is off sometimes at home it feels like it may pop         Timed:    Manual Therapy:         mins  53511;  Therapeutic Exercise:    25     mins  81859;     Neuromuscular Cynthia:    18    mins  82058;    Therapeutic Activity:          mins  96581;     Gait Training:           mins  65778;     Ultrasound:          mins  55770;    Electrical Stimulation:         mins  71296 ( );  Iontophoresis         mins 32986;  Aquatic Therapy         mins 98146;  Dry Needling                    mins    Untimed:  Electrical Stimulation:         mins  81620 ( );  Mechanical Traction:         mins  21144;     Timed Treatment:   43   mins   Total Treatment:     43   mins  Dia Austin, PT  Physical Therapist

## 2020-11-02 ENCOUNTER — TREATMENT (OUTPATIENT)
Dept: PHYSICAL THERAPY | Facility: CLINIC | Age: 25
End: 2020-11-02

## 2020-11-02 DIAGNOSIS — S83.004D DISLOCATION OF RIGHT PATELLA, SUBSEQUENT ENCOUNTER: ICD-10-CM

## 2020-11-02 DIAGNOSIS — R26.2 DIFFICULTY WALKING: ICD-10-CM

## 2020-11-02 DIAGNOSIS — M25.561 ACUTE PAIN OF RIGHT KNEE: Primary | ICD-10-CM

## 2020-11-02 DIAGNOSIS — Z74.09 IMPAIRED FUNCTIONAL MOBILITY AND ACTIVITY TOLERANCE: ICD-10-CM

## 2020-11-02 PROCEDURE — 97530 THERAPEUTIC ACTIVITIES: CPT | Performed by: PHYSICAL THERAPIST

## 2020-11-02 PROCEDURE — 97110 THERAPEUTIC EXERCISES: CPT | Performed by: PHYSICAL THERAPIST

## 2020-11-02 NOTE — PROGRESS NOTES
Physical Therapy Daily Progress Note    Patient: Taty Chacko   : 1995  Diagnosis/ICD-10 Code:  Acute pain of right knee [M25.561]  Referring practitioner: Dr. Raissa Hodges  Date of Initial Visit: Type: THERAPY  Noted: 2020  Today's Date: 2020  Patient seen for 18 sessions           Subjective It was pretty sore by the end of the work week    Objective   See Exercise, Manual, and Modality Logs for complete treatment.     Exercises:  -upright bike seat 3, 5 min  -[place] single leg press, 120 lb, 2x20  -machine D hip abd 50# 25  -matrix hip add 55 lb 2x15  -cable 3 way flex ext adduction. 2.5# 15 each (opp leg on step)  -cable walk outs back and side. 12.5#, 3 each way (strap around waist)  -ball squats 15x at wall defer  -C leg curl 20# 20x  -TKE with green band, x20  defer   -hip abd/ER with green band x20, B and single leg. defer  -fwd and lateral step ups, 6 inch, 2x10 defer  -fwd step downs 6 in x10 (toe hanging off edge to decrease knee flexion). defer  -rockerboard M/L and center balance and mini squats x10 with 5 sec hold  -SLS with toe touch on balance discs x10  -hurdles fwd and side stepping, 2 laps    Assessment/Plan  Pt still not feeling secure with her knee in certain positions, squatting and stooping. She is wearing her brace at work at all times, and this does help her, but she is sore by the end of the work day.          Timed:    Manual Therapy:         mins  91503;  Therapeutic Exercise:    25     mins  53049;     Neuromuscular Cynthia:        mins  61312;    Therapeutic Activity:     15     mins  21301;     Gait Training:           mins  51073;     Ultrasound:         mins  50940;    Electrical Stimulation:         mins  37029 ( );  Iontophoresis         mins 88487;  Aquatic Therapy         mins 66958;  Dry Needling                   mins    Untimed:  Electrical Stimulation:         mins  16577 ( );  Mechanical Traction:         mins  47998;     Timed Treatment:   40    mins   Total Treatment:     40   mins  Dia Austin, PT  Physical Therapist

## 2020-11-06 ENCOUNTER — TREATMENT (OUTPATIENT)
Dept: PHYSICAL THERAPY | Facility: CLINIC | Age: 25
End: 2020-11-06

## 2020-11-06 DIAGNOSIS — Z74.09 IMPAIRED FUNCTIONAL MOBILITY AND ACTIVITY TOLERANCE: ICD-10-CM

## 2020-11-06 DIAGNOSIS — R26.2 DIFFICULTY WALKING: ICD-10-CM

## 2020-11-06 DIAGNOSIS — S83.004D DISLOCATION OF RIGHT PATELLA, SUBSEQUENT ENCOUNTER: ICD-10-CM

## 2020-11-06 DIAGNOSIS — M25.561 ACUTE PAIN OF RIGHT KNEE: Primary | ICD-10-CM

## 2020-11-06 PROCEDURE — 97112 NEUROMUSCULAR REEDUCATION: CPT | Performed by: PHYSICAL THERAPIST

## 2020-11-06 PROCEDURE — 97110 THERAPEUTIC EXERCISES: CPT | Performed by: PHYSICAL THERAPIST

## 2020-11-06 PROCEDURE — 97530 THERAPEUTIC ACTIVITIES: CPT | Performed by: PHYSICAL THERAPIST

## 2020-11-06 NOTE — PROGRESS NOTES
Physical Therapy Daily Progress Note    Patient: Taty Chacko   : 1995  Diagnosis/ICD-10 Code:  Acute pain of right knee [M25.561]  Referring practitioner: Dr. Raissa Hodges  Date of Initial Visit: Type: THERAPY  Noted: 2020  Today's Date: 2020  Patient seen for 19 sessions           Subjective It's pretty sore today. Pain 5/10. Feel like I am better overall, but not back to normal    Objective   See Exercise, Manual, and Modality Logs for complete treatment.     Exercises:  -upright bike seat 3, 5 min  -[place] single leg press, 130 lb, 2x20  -machine D hip abd 50# 25  -matrix hip add 55 lb 2x15  -cable 3 way flex ext adduction. 2.5# 15 each (opp leg on step)  -cable walk outs back and side. 12.5#, 3 each way (strap around waist)  -ball squats 15x at wall defer  -C leg curl 25# 20x  -TKE with green band, x20   -hip abd/ER with green band x20, B and single leg.   -fwd and lateral step ups, 6 inch, 2x10 defer  -fwd step downs 6 in x10 (toe hanging off edge to decrease knee flexion). defer  -rockerboard M/L and center balance and mini squats x10 with 5 sec hold  -SLS with toe touch on balance discs x10  -hurdles fwd and side stepping, 2 laps    Assessment/Plan  Pt is feeling stronger with her knee and trusting it more, but still has pain and soreness across the joint line and under the patella. She does HE, so she is going to try and soften her knees more when she has to stand at work. Continues to ice and wear her brace         Timed:    Manual Therapy:         mins  79285;  Therapeutic Exercise:    15     mins  62400;     Neuromuscular Cynthia:    15    mins  74908;    Therapeutic Activity:     10     mins  24939;     Gait Training:           mins  72931;     Ultrasound:          mins  75545;    Electrical Stimulation:         mins  29456 ( );  Iontophoresis         mins 83237;  Aquatic Therapy         mins 14624;  Dry Needling                   mins    Untimed:  Electrical Stimulation:          mins  95821 ( );  Mechanical Traction:         mins  61559;     Timed Treatment:  40    mins   Total Treatment:     40   mins  Dia Austin, PT  Physical Therapist

## 2020-11-09 ENCOUNTER — TREATMENT (OUTPATIENT)
Dept: PHYSICAL THERAPY | Facility: CLINIC | Age: 25
End: 2020-11-09

## 2020-11-09 DIAGNOSIS — Z74.09 IMPAIRED FUNCTIONAL MOBILITY AND ACTIVITY TOLERANCE: ICD-10-CM

## 2020-11-09 DIAGNOSIS — S83.004D DISLOCATION OF RIGHT PATELLA, SUBSEQUENT ENCOUNTER: ICD-10-CM

## 2020-11-09 DIAGNOSIS — R26.2 DIFFICULTY WALKING: ICD-10-CM

## 2020-11-09 DIAGNOSIS — M25.561 ACUTE PAIN OF RIGHT KNEE: Primary | ICD-10-CM

## 2020-11-09 PROCEDURE — 97530 THERAPEUTIC ACTIVITIES: CPT | Performed by: PHYSICAL THERAPIST

## 2020-11-09 PROCEDURE — 97110 THERAPEUTIC EXERCISES: CPT | Performed by: PHYSICAL THERAPIST

## 2020-11-09 PROCEDURE — 97112 NEUROMUSCULAR REEDUCATION: CPT | Performed by: PHYSICAL THERAPIST

## 2020-11-09 NOTE — PROGRESS NOTES
30-Day / 10-Visit Progress Note         Patient: Taty Chacko   : 1995  Diagnosis/ICD-10 Code:  Acute pain of right knee [M25.561]  Referring practitioner: CORDELL Hodges  Date of Initial Visit: Type: THERAPY  Noted: 2020  Today's Date: 2020  Patient seen for 20 sessions      Subjective:     Clinical Progress: improved, but slow at present  Home Program Compliance: Yes  Treatment has included:  therapeutic exercise, manual therapy, therapeutic activity, neuro-muscular retraining , gait training, ultrasound and patient education with home exercise program     Subjective Evaluation    History of Present Illness    Subjective comment: It is pretty sore by the end of the work week, 6-7/10 pain in the knee. Having feelings of instability at times like it wants to pop out again     Objective          Tenderness     Right Knee   Tenderness in the inferior patella.     Active Range of Motion     Right Knee   Flexion: 130 degrees   Extension: 7 (of HE) degrees     Strength/Myotome Testing     Right Knee   Flexion: 4+  Extension: 4+      See Exercise, Manual, and Modality Logs for complete treatment.     Functional outcome score: Knee Outcome Survey= 78.75%    Exercises:  -upright bike seat 3, 5 min  -Hopkins single leg press, 130 lb, 2x20  -machine D hip abd 50# 25  -matrix hip add 55 lb 2x15  -cable 3 way flex ext adduction. 2.5# 15 each (opp leg on step)  -cable walk outs back and side. 12.5#, 3 each way (strap around waist)  -ball squats 15x at wall defer  -C leg curl 25# 25x  -TKE with green band, x20   -hip abd/ER with green band x20, B and single leg.   -fwd and lateral step ups, 6 inch, 2x10 defer  -rockerboard M/L and center balance and mini squats x10 with 5 sec hold  -hurdles fwd and side stepping, 2 laps    Assessment & Plan     Assessment  Assessment details: Taty Chacko has been seen for 20 physical therapy sessions for R knee pain following.  Treatment has included therapeutic exercise,  manual therapy, therapeutic activity, neuro-muscular retraining , gait training, ultrasound and patient education with home exercise program . Progress to physical therapy goals is good.  Taty has improved her ROM to 7 deg of HE to 130 deg, strength is 4+/5 for hip and knee, and has improved her score on the KNEE OUTCOME SURVEY to 78.75%. She still has pain up to 6-7/10 by the end of the work week, tenderness over the inferior patella, and times where she feels that her patella may sublux. She will benefit from continued skilled physical therapy to address remaining impairments and functional limitations.     Prognosis: good    Goals  Plan Goals: ST wks  1. Patient will be independent with education for symptom management, joint protection and strategies to minimize stress on affected tissues (MET)    2. Patient will increase knee strength by exhibiting no quad lag with SLR to reduce the buckling in her knee (MET)   3. Pt to improve knee ROM to 6 deg of HE to 128 deg for improved gait pattern (MET)    4. Pt to improve pain complaints to no more than 4/10 with ADLs and walking (PART MET)   5. Pt to be measured and fit for custom orthotics (DC)      LT wks  1. Pt to improve knee ROM 10 deg of HE to 132 deg to for improved gait pattern (ONGOING)   2. Pt to improve score on Knee Outcome Survey from 46.25% to 85% for overall functional improvement (ONGOING) improved to 78.75%  3. Patient will increase knee strength to 4+ to 5/5 to improve functional mobility (PART MET)    4. Patient will negotiate stairs reciprocally with rail support as needed (MET)   5. Pt able to get up and down from the floor without increased pain (ONGOING)   6. Pt to be independent with wear pattern for her orthotics (DC)   7. Patient will demonstrate an independent HEP for core and knee strength and flexibility/ROM. (ONGOING)     Plan  Therapy options: will be seen for skilled physical therapy services  Frequency: 2x week  Duration in  weeks: 4           Recommendations: Continue as planned  Timeframe: 1 month  Prognosis to achieve goals: good    PT Signature: Dia Austin, PT      Based upon review of the patient's progress and continued therapy plan, it is my medical opinion that Taty Chacko should continue physical therapy treatment at [facility]  [address]   [place] [address]  [phone].    Signature: __________________________________      Timed:  Manual Therapy:         mins  46570;  Therapeutic Exercise:     20    mins  16288;     Neuromuscular Cynthia:     10   mins  70053;    Therapeutic Activity:     10     mins  36413;     Gait Training:           mins  29828;     Ultrasound:          mins  44180;    Electrical Stimulation:         mins  45933 ( );  Iontophoresis         mins 61594;  Dry Needling                   mins    Untimed:  Electrical Stimulation:         mins  54321 ( );  Mechanical Traction:         mins  37247;     Timed Treatment:   40   mins   Total Treatment:     40   mins

## 2020-11-10 ENCOUNTER — OFFICE VISIT (OUTPATIENT)
Dept: ORTHOPEDIC SURGERY | Facility: CLINIC | Age: 25
End: 2020-11-10

## 2020-11-10 VITALS — HEIGHT: 66 IN | TEMPERATURE: 98.1 F | WEIGHT: 170 LBS | BODY MASS INDEX: 27.32 KG/M2

## 2020-11-10 DIAGNOSIS — M25.561 ACUTE PAIN OF RIGHT KNEE: Primary | ICD-10-CM

## 2020-11-10 PROCEDURE — 99212 OFFICE O/P EST SF 10 MIN: CPT | Performed by: ORTHOPAEDIC SURGERY

## 2020-11-10 PROCEDURE — 20610 DRAIN/INJ JOINT/BURSA W/O US: CPT | Performed by: ORTHOPAEDIC SURGERY

## 2020-11-10 RX ORDER — METHYLPREDNISOLONE ACETATE 80 MG/ML
80 INJECTION, SUSPENSION INTRA-ARTICULAR; INTRALESIONAL; INTRAMUSCULAR; SOFT TISSUE
Status: COMPLETED | OUTPATIENT
Start: 2020-11-10 | End: 2020-11-10

## 2020-11-10 RX ADMIN — METHYLPREDNISOLONE ACETATE 80 MG: 80 INJECTION, SUSPENSION INTRA-ARTICULAR; INTRALESIONAL; INTRAMUSCULAR; SOFT TISSUE at 14:58

## 2020-11-10 NOTE — PROGRESS NOTES
Right Knee Follow Up      Patient: Taty Chacko        YOB: 1995            Chief Complaints: right knee pain      History of Present Illness:      Physical Exam: 25 y.o. female  General Appearance:    Alert, cooperative, in no acute distress                 There were no vitals filed for this visit.     Patient is alert and read ×3 no acute distress appears her above-listed at height weight and age.  Affect is normal respiratory rate is normal unlabored. Heart rate regular rate rhythm, sclera, dentition and hearing are normal for the purpose of this exam.      Ortho Exam                 Assessment/Plan:

## 2020-11-10 NOTE — PROGRESS NOTES
Patient: Taty Chacko  YOB: 1995  Date of Service: 11/10/2020    Chief Complaints:   Chief Complaint   Patient presents with   • Right Knee - Follow-up, Pain     Patient is here follow-up of right knee pain  Subjective:    History of Present Illness: Pt is seen in the office today with complaints of   Chief Complaint   Patient presents with   • Right Knee - Follow-up, Pain   .    Patient for right knee pain she had a patella subluxation event.  We have done an MRI which shows some capsulitis and MCL sprain been physical therapy she is in a brace he is back working not full amount of the back working states she is better than she was before but still hurting      Allergies:   Allergies   Allergen Reactions   • Hydrocodone Itching and Rash       Medications:   Home Medications:  Current Outpatient Medications on File Prior to Visit   Medication Sig   • Biotin 1000 MCG tablet Take 1,000 mcg by mouth Daily.   • FLUoxetine (PROzac) 20 MG capsule Take 20 mg by mouth Daily.   • hydrOXYzine (ATARAX) 25 MG tablet Take 25-50 mg by mouth.   • ibuprofen (ADVIL,MOTRIN) 200 MG tablet Take  by mouth.   • levonorgestrel (KYLEENA) 19.5 MG intrauterine device IUD 1 each by Intrauterine route 1 (One) Time.   • minocycline (MINOCIN,DYNACIN) 50 MG capsule Take 25 mg by mouth 2 (Two) Times a Day.   • Multiple Vitamins-Minerals (MULTIVITAMIN ADULT PO) Take  by mouth Daily.   • Probiotic Product (PROBIOTIC DAILY PO) Take  by mouth.   • rizatriptan (MAXALT) 10 MG tablet Take 1 tablet by mouth 1 (One) Time As Needed for Migraine for up to 1 dose.   • topiramate (TOPAMAX) 50 MG tablet Take 1 tablet by mouth Every Night.   • [DISCONTINUED] HYDROcodone-acetaminophen (NORCO) 5-325 MG per tablet Take 1 tablet by mouth Every 6 (Six) Hours As Needed for Moderate Pain .     No current facility-administered medications on file prior to visit.      Current Medications:  Scheduled Meds:  Continuous Infusions:No current  facility-administered medications for this visit.     PRN Meds:.    I have reviewed the patient's medical history in detail and updated the computerized patient record.  Review and summarization of old records include:    Past Medical History:   Diagnosis Date   • Anxiety    • Arthritis    • Kidney infection    • Kidney stone    • Migraine         Past Surgical History:   Procedure Laterality Date   • NO PAST SURGERIES          Social History     Occupational History   • Not on file   Tobacco Use   • Smoking status: Never Smoker   • Smokeless tobacco: Never Used   Substance and Sexual Activity   • Alcohol use: Yes     Alcohol/week: 4.0 standard drinks     Types: 4 Glasses of wine per week     Comment: social   • Drug use: No   • Sexual activity: Defer      Social History     Social History Narrative   • Not on file        Family History   Problem Relation Age of Onset   • Migraines Paternal Aunt    • Stroke Paternal Grandfather        ROS: 14 point review of systems was performed and was negative except for documented findings in HPI and today's encounter.     Allergies:   Allergies   Allergen Reactions   • Hydrocodone Itching and Rash     Constitutional:  Denies fever, shaking or chills   Eyes:  Denies change in visual acuity   HENT:  Denies nasal congestion or sore throat   Respiratory:  Denies cough or shortness of breath   Cardiovascular:  Denies chest pain or severe LE edema   GI:  Denies abdominal pain, nausea, vomiting, bloody stools or diarrhea   Musculoskeletal:  Numbness, tingling, or loss of motor function only as noted above in history of present illness.  : Denies painful urination or hematuria  Integument:  Denies rash, lesion or ulceration   Neurologic:  Denies headache or focal weakness  Endocrine:  Denies lymphadenopathy  Psych:  Denies confusion or change in mental status   Hem:  Denies active bleeding      Physical Exam: 25 y.o. female  Wt Readings from Last 3 Encounters:   11/10/20 77.1 kg (170  lb)   10/08/20 78.4 kg (172 lb 12.8 oz)   09/17/20 77.1 kg (170 lb)       Body mass index is 27.44 kg/m².  Facility age limit for growth percentiles is 20 years.  Vitals:    11/10/20 1429   Temp: 98.1 °F (36.7 °C)     Vital signs reviewed.   General Appearance:    Alert, cooperative, in no acute distress                    Ortho exam    Exam of the right knee she has no effusion no redness she still has some quad atrophy negative apprehension still some pain medially pain with valgus stressing but no laxity negative bounce home some pain with medial Edward         .time    Assessment: Persistent right knee pain following a patella subluxation with some capsulitis and MCL sprain still think she needs to get stronger I think an injection would probably calm this knee down I think that is what we need to do    Plan: Injection and continued strengthening  Follow up as indicated.  Ice, elevate, and rest as needed.  Discussed conservative measures of pain control including ice, bracing.  Also talked about the importance of strengthening and maintaining ideal body weight    Raissa Hodges M.D.      Large Joint Arthrocentesis: R knee  Date/Time: 11/10/2020 2:58 PM  Consent given by: patient  Site marked: site marked  Timeout: Immediately prior to procedure a time out was called to verify the correct patient, procedure, equipment, support staff and site/side marked as required   Supporting Documentation  Indications: pain   Procedure Details  Location: knee - R knee  Preparation: Patient was prepped and draped in the usual sterile fashion  Needle size: 22 G  Approach: anteromedial  Medications administered: 4 mL lidocaine (cardiac); 80 mg methylPREDNISolone acetate 80 MG/ML

## 2020-11-11 ENCOUNTER — TELEPHONE (OUTPATIENT)
Dept: ORTHOPEDIC SURGERY | Facility: CLINIC | Age: 25
End: 2020-11-11

## 2020-11-11 NOTE — TELEPHONE ENCOUNTER
LAVERN FROM [facility] CLAIMS IS WANTING TO CONFIRM WORK ORDERS AND IF THE PATIENT IS BEING LIMITED.     PHONE: [phone]

## 2020-11-12 NOTE — TELEPHONE ENCOUNTER
Dr. Hodges can you enter a work note in chart for patient last exam; any restrictions?  [facility] is asking.

## 2020-11-16 ENCOUNTER — TELEPHONE (OUTPATIENT)
Dept: ORTHOPEDIC SURGERY | Facility: CLINIC | Age: 25
End: 2020-11-16

## 2020-11-16 NOTE — TELEPHONE ENCOUNTER
I honestly cannot remember what we told her can you check with her so I am consistent I think she is released without restrictions but want to make sure

## 2020-11-17 ENCOUNTER — OFFICE VISIT (OUTPATIENT)
Dept: NEUROLOGY | Facility: CLINIC | Age: 25
End: 2020-11-17

## 2020-11-17 ENCOUNTER — TELEPHONE (OUTPATIENT)
Dept: ORTHOPEDIC SURGERY | Facility: CLINIC | Age: 25
End: 2020-11-17

## 2020-11-17 VITALS
HEIGHT: 66 IN | OXYGEN SATURATION: 99 % | WEIGHT: 173 LBS | SYSTOLIC BLOOD PRESSURE: 128 MMHG | DIASTOLIC BLOOD PRESSURE: 74 MMHG | HEART RATE: 84 BPM | BODY MASS INDEX: 27.8 KG/M2

## 2020-11-17 DIAGNOSIS — G43.001 MIGRAINE WITHOUT AURA AND WITH STATUS MIGRAINOSUS, NOT INTRACTABLE: Primary | ICD-10-CM

## 2020-11-17 PROCEDURE — 99213 OFFICE O/P EST LOW 20 MIN: CPT | Performed by: NURSE PRACTITIONER

## 2020-11-17 RX ORDER — TOPIRAMATE 50 MG/1
50 TABLET, FILM COATED ORAL 2 TIMES DAILY
Qty: 60 TABLET | Refills: 3 | Status: SHIPPED | OUTPATIENT
Start: 2020-11-17 | End: 2022-11-22

## 2020-11-17 NOTE — TELEPHONE ENCOUNTER
Caller: JESSE    Relationship: [employer]    Best call back number: [phone]    What form or medical record are you requesting: WORK STATUS REPORT FOR 11/10/20    Who is requesting this form or medical record from you: JESSE HEAD ALYSSA    How would you like to receive the form or medical records (pick-up, mail, fax):   If fax, what is the fax number: [phone]    Timeframe paperwork needed: ASAP    Additional notes: JESSE HEAD ALYSSA IS CALLING REGARDING A WORK STATUS REPORT FOR 11/10/20. JESSE STATED SHE HAD RECEIVED THE OFFICE NOTES FROM 11/10/20 BUT IT WAS MISSING THE WORK STATUS REPORT. IF YOU COULD FAX THAT OVER TO THE FAX NUMBER LISTED ABOVE.

## 2020-11-17 NOTE — PROGRESS NOTES
Subjective:     Patient ID: Taty Chacko is a 25 y.o. female presenting for follow up for migraine headaches. She is a previous patient of Dr. Wright, last seen about one year ago. Her migraines have worsened since that time. She went from having a couple per month to about 2 per week. She works as a  at [employer] and says she was off of work for a while but since returning a few months ago her headaches have been worse. She is currently taking topiramate 50 mg nightly and uses Maxalt as needed. She feels the Maxalt is not as effective as it once was. She also inquires about pregnancy and safe medications. She is not currently trying to become pregnant but plans to do so in the next year or so.     History of Present Illness  The following portions of the patient's history were reviewed and updated as appropriate: allergies, current medications, past family history, past medical history, past social history, past surgical history and problem list.    Review of Systems   Constitutional: Negative for chills, fatigue and fever.   HENT: Negative for hearing loss, tinnitus and trouble swallowing.    Eyes: Positive for photophobia and visual disturbance. Negative for pain.   Respiratory: Negative for cough and wheezing.    Cardiovascular: Negative for chest pain, palpitations and leg swelling.   Gastrointestinal: Positive for nausea and vomiting. Negative for diarrhea.   Endocrine: Negative for cold intolerance, heat intolerance and polydipsia.   Genitourinary: Negative for decreased urine volume, difficulty urinating and urgency.   Musculoskeletal: Positive for neck pain and neck stiffness. Negative for back pain.   Skin: Negative for color change, rash and wound.   Allergic/Immunologic: Negative for environmental allergies, food allergies and immunocompromised state.   Neurological: Positive for headaches. Negative for dizziness, tremors, seizures, syncope, facial asymmetry, speech difficulty, weakness,  light-headedness and numbness.   Hematological: Negative for adenopathy. Does not bruise/bleed easily.   Psychiatric/Behavioral: Positive for sleep disturbance. Negative for confusion. The patient is not nervous/anxious.         Objective:    Neurologic Exam  General: Well nourished, well developed, and in no acute distress.  HEENT: Normocephalic/atraumatic. Mucous membranes moist. Sclerae anicteric.   Heart: Regular rate and rhythm. No murmurs, rubs or gallops.  Lungs: Clear to auscultation bilaterally.  Skin: No notable rashes or lesions on the visible surfaces.   Extremities: No clubbing, cyanosis or significant edema.   Psychiatric: Pleasant, cooperative, and appropriate.   Neurologic Exam:  Mental Status:  Alert and oriented. Speech is fluent. Comprehension is intact.   Cranial Nerves II-XII: Pupils equal, round, reactive to light. Extraocular movements are full and conjugate in all directions. Pursuit movements do not provoke any apparent dizziness or discomfort.  No nystagmus noted.  Hearing is intact to voice. Facial strength and sensation are preserved and symmetric. Tongue and palate midline. Voice non-hoarse, non-dysarthric.   Motor: Normal bulk and tone of bilateral upper and lower extremities. Strength is 5/5 in all 4 extremities both proximally and distally. There are no abnormal or involuntary movements noted.  Sensation: Intact to light touch throughout. Romberg was negative with no significant sway.   Coordination: Fully intact. Finger-to-nose performed accurately bilaterally.  Reflexes: The deep tendon reflexes are 2+/4 in bilateral biceps, brachioradialis, triceps, patella, and Achilles.  No pathologic reflexes were noted.  Gait: Normal. No ataxia or apraxia.         Physical Exam    Assessment/Plan:     Diagnoses and all orders for this visit:    1. Migraine without aura and with status migrainosus, not intractable (Primary)    Other orders  -     topiramate (TOPAMAX) 50 MG tablet; Take 1 tablet  by mouth 2 (Two) Times a Day.  Dispense: 60 tablet; Refill: 3      Her headaches have worsened lately and she is no longer responding as well to the rizatriptan. I am going increase her topiramate to 25 mg in the morning (continue 50 mg at night) and then up to 50 mg bid if needed. R/B/SE reviewed including pregnancy category. If this increase doesn't work well for her I recommended trying Emgality or nortriptyline. I also gave her samples of Ubrelvy to try in place of the rizatriptan. If this works well for her she can call and I will send in a prescription. Instructions for use given and side effects discussed. She is in agreement with this plan. She will doyle with any problems, otherwise f/u 6 months.

## 2020-12-08 ENCOUNTER — OFFICE VISIT (OUTPATIENT)
Dept: ORTHOPEDIC SURGERY | Facility: CLINIC | Age: 25
End: 2020-12-08

## 2020-12-08 VITALS — TEMPERATURE: 96.2 F | HEIGHT: 66 IN | WEIGHT: 170 LBS | BODY MASS INDEX: 27.32 KG/M2

## 2020-12-08 DIAGNOSIS — G89.29 CHRONIC PAIN OF RIGHT KNEE: Primary | ICD-10-CM

## 2020-12-08 DIAGNOSIS — M25.561 CHRONIC PAIN OF RIGHT KNEE: Primary | ICD-10-CM

## 2020-12-08 PROCEDURE — 99213 OFFICE O/P EST LOW 20 MIN: CPT | Performed by: ORTHOPAEDIC SURGERY

## 2020-12-08 RX ORDER — CEFAZOLIN SODIUM 2 G/100ML
2 INJECTION, SOLUTION INTRAVENOUS ONCE
Status: CANCELLED | OUTPATIENT
Start: 2020-12-16 | End: 2020-12-08

## 2020-12-08 NOTE — PROGRESS NOTES
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  In view of all this view the fact that she is failed conservative management I think it is reasonable to proceed with diagnostic arthroscopy.  My suspicion is she has a cartilage defect that is catching with her activities      Physical Exam: 25 y.o. female  General Appearance:    Alert, cooperative, in no acute distress                 There were no vitals filed for this visit.     Patient is alert and read ×3 no acute distress appears her above-listed at height weight and age.  Affect is normal respiratory rate is normal unlabored. Heart rate regular rate rhythm, sclera, dentition and hearing are normal for the purpose of this exam.      Ortho Exam     Physical exam of the right knee reveals no effusion, no erythema.  The patient has no palpable tenderness along the medial joint line, no tenderness about the lateral joint line.  Patient does have crepitus with patellofemoral range of motion.  They also have subjective symptoms anteriorly during exam.  The patient has a negative bounce home, negative Edward and a stable ligamentous exam.  Quad tone is reasonable and symmetric.  There are no overlying skin changes no lymphedema no lymphadenopathy.  There is good hip range of motion which is full symmetric and asymptomatic and a normal ankle exam.  Hamstrings and IT band are tight bilaterally.    Physical Exam: 25 y.o. female  General Appearance:    Alert, cooperative, in no acute distress                      Vitals:    12/08/20 1339   Temp: 96.2 °F (35.7 °C)  "  Weight: 77.1 kg (170 lb)   Height: 167.6 cm (66\")   PainSc:   5        Head:    Normocephalic, without obvious abnormality, atraumatic   Eyes:            conjunctivae and sclerae normal, no pallor, corneas clear,    Ears:    Ears appear intact with no abnormalities noted   Throat:   No oral lesions, no thrush, oral mucosa moist   Neck:   No adenopathy, supple, trachea midline, no thyromegaly,    Back:     No kyphosis present, no scoliosis present, no skin lesions,      erythema or scars, no tenderness to percussion or                   palpation,   range of motion normal   Lungs:     Clear to auscultation,respirations regular, even and                  unlabored    Heart:    Regular rhythm and normal rate               Chest Wall:    No abnormalities observed               Pulses:   Pulses palpable and equal bilaterally   Skin:   No bleeding, bruising or rash   Lymph nodes:   No palpable adenopathy   Neurologic:   Appears neurologic intact                 Assessment/Plan: Persistent right knee pain despite conservative measures I suspect there is a cartilaginous flap or something causing her persistent pain plan is to proceed with knee arthroscopy I have discussed in detail with her risk benefits and alternatives The patient voiced understanding of the risks, benefits, and alternative forms of treatment that were discussed and the patient consents to proceed with the above listed surgery.  All risks, benefits and alternatives were discussed.  Risks including to but not exclusive to anesthetic complications, including death, MI, CVA, infection, bleeding DVT, fracture, residual pain and need for future surgery.  They understand and agree to proceed            "

## 2020-12-10 ENCOUNTER — TRANSCRIBE ORDERS (OUTPATIENT)
Dept: SLEEP MEDICINE | Facility: HOSPITAL | Age: 25
End: 2020-12-10

## 2020-12-10 DIAGNOSIS — Z01.818 OTHER SPECIFIED PRE-OPERATIVE EXAMINATION: Primary | ICD-10-CM

## 2020-12-10 PROBLEM — M25.561 CHRONIC PAIN OF RIGHT KNEE: Status: ACTIVE | Noted: 2020-12-10

## 2020-12-10 PROBLEM — G89.29 CHRONIC PAIN OF RIGHT KNEE: Status: ACTIVE | Noted: 2020-12-10

## 2020-12-14 ENCOUNTER — LAB (OUTPATIENT)
Dept: LAB | Facility: HOSPITAL | Age: 25
End: 2020-12-14

## 2020-12-14 DIAGNOSIS — Z01.818 OTHER SPECIFIED PRE-OPERATIVE EXAMINATION: ICD-10-CM

## 2020-12-14 PROCEDURE — C9803 HOPD COVID-19 SPEC COLLECT: HCPCS

## 2020-12-14 PROCEDURE — U0004 COV-19 TEST NON-CDC HGH THRU: HCPCS

## 2020-12-15 LAB — SARS-COV-2 RNA RESP QL NAA+PROBE: NOT DETECTED

## 2020-12-16 ENCOUNTER — ANESTHESIA (OUTPATIENT)
Dept: PERIOP | Facility: HOSPITAL | Age: 25
End: 2020-12-16

## 2020-12-16 ENCOUNTER — HOSPITAL ENCOUNTER (OUTPATIENT)
Facility: HOSPITAL | Age: 25
Setting detail: HOSPITAL OUTPATIENT SURGERY
Discharge: HOME | End: 2020-12-16
Attending: ORTHOPAEDIC SURGERY | Admitting: ORTHOPAEDIC SURGERY

## 2020-12-16 ENCOUNTER — ANESTHESIA EVENT (OUTPATIENT)
Dept: PERIOP | Facility: HOSPITAL | Age: 25
End: 2020-12-16

## 2020-12-16 VITALS
DIASTOLIC BLOOD PRESSURE: 73 MMHG | SYSTOLIC BLOOD PRESSURE: 113 MMHG | HEART RATE: 78 BPM | OXYGEN SATURATION: 99 % | TEMPERATURE: 98.4 F | RESPIRATION RATE: 16 BRPM

## 2020-12-16 DIAGNOSIS — G89.29 CHRONIC PAIN OF RIGHT KNEE: ICD-10-CM

## 2020-12-16 DIAGNOSIS — M25.561 CHRONIC PAIN OF RIGHT KNEE: ICD-10-CM

## 2020-12-16 LAB
B-HCG UR QL: NEGATIVE
INTERNAL NEGATIVE CONTROL: NEGATIVE
INTERNAL POSITIVE CONTROL: POSITIVE
Lab: NORMAL

## 2020-12-16 PROCEDURE — 25010000002 PROPOFOL 10 MG/ML EMULSION: Performed by: NURSE ANESTHETIST, CERTIFIED REGISTERED

## 2020-12-16 PROCEDURE — 25010000003 CEFAZOLIN IN DEXTROSE 2-4 GM/100ML-% SOLUTION: Performed by: ORTHOPAEDIC SURGERY

## 2020-12-16 PROCEDURE — 63710000001 PROMETHAZINE PER 25 MG: Performed by: NURSE ANESTHETIST, CERTIFIED REGISTERED

## 2020-12-16 PROCEDURE — 25010000002 METHYLPREDNISOLONE PER 80 MG: Performed by: ORTHOPAEDIC SURGERY

## 2020-12-16 PROCEDURE — 25010000002 HYDROMORPHONE PER 4 MG: Performed by: ANESTHESIOLOGY

## 2020-12-16 PROCEDURE — 25010000002 MIDAZOLAM PER 1 MG: Performed by: STUDENT IN AN ORGANIZED HEALTH CARE EDUCATION/TRAINING PROGRAM

## 2020-12-16 PROCEDURE — 29877 ARTHRS KNEE SURG DBRDMT/SHVG: CPT | Performed by: ORTHOPAEDIC SURGERY

## 2020-12-16 PROCEDURE — 25010000002 KETOROLAC TROMETHAMINE PER 15 MG: Performed by: NURSE ANESTHETIST, CERTIFIED REGISTERED

## 2020-12-16 PROCEDURE — 81025 URINE PREGNANCY TEST: CPT | Performed by: STUDENT IN AN ORGANIZED HEALTH CARE EDUCATION/TRAINING PROGRAM

## 2020-12-16 PROCEDURE — 25010000002 FENTANYL CITRATE (PF) 100 MCG/2ML SOLUTION: Performed by: NURSE ANESTHETIST, CERTIFIED REGISTERED

## 2020-12-16 PROCEDURE — 25010000002 ONDANSETRON PER 1 MG: Performed by: NURSE ANESTHETIST, CERTIFIED REGISTERED

## 2020-12-16 PROCEDURE — 25010000002 DEXAMETHASONE PER 1 MG: Performed by: NURSE ANESTHETIST, CERTIFIED REGISTERED

## 2020-12-16 RX ORDER — METHYLPREDNISOLONE ACETATE 80 MG/ML
INJECTION, SUSPENSION INTRA-ARTICULAR; INTRALESIONAL; INTRAMUSCULAR; SOFT TISSUE
Status: DISCONTINUED
Start: 2020-12-16 | End: 2020-12-16 | Stop reason: HOSPADM

## 2020-12-16 RX ORDER — SODIUM CHLORIDE 0.9 % (FLUSH) 0.9 %
3 SYRINGE (ML) INJECTION EVERY 12 HOURS SCHEDULED
Status: DISCONTINUED | OUTPATIENT
Start: 2020-12-16 | End: 2020-12-16 | Stop reason: HOSPADM

## 2020-12-16 RX ORDER — KETOROLAC TROMETHAMINE 30 MG/ML
INJECTION, SOLUTION INTRAMUSCULAR; INTRAVENOUS AS NEEDED
Status: DISCONTINUED | OUTPATIENT
Start: 2020-12-16 | End: 2020-12-16 | Stop reason: SURG

## 2020-12-16 RX ORDER — EPHEDRINE SULFATE 50 MG/ML
5 INJECTION, SOLUTION INTRAVENOUS ONCE AS NEEDED
Status: DISCONTINUED | OUTPATIENT
Start: 2020-12-16 | End: 2020-12-16 | Stop reason: HOSPADM

## 2020-12-16 RX ORDER — PROPOFOL 10 MG/ML
VIAL (ML) INTRAVENOUS AS NEEDED
Status: DISCONTINUED | OUTPATIENT
Start: 2020-12-16 | End: 2020-12-16 | Stop reason: SURG

## 2020-12-16 RX ORDER — NALOXONE HCL 0.4 MG/ML
0.2 VIAL (ML) INJECTION AS NEEDED
Status: DISCONTINUED | OUTPATIENT
Start: 2020-12-16 | End: 2020-12-16 | Stop reason: HOSPADM

## 2020-12-16 RX ORDER — HYDROMORPHONE HYDROCHLORIDE 1 MG/ML
0.5 INJECTION, SOLUTION INTRAMUSCULAR; INTRAVENOUS; SUBCUTANEOUS
Status: DISCONTINUED | OUTPATIENT
Start: 2020-12-16 | End: 2020-12-16 | Stop reason: HOSPADM

## 2020-12-16 RX ORDER — FLUMAZENIL 0.1 MG/ML
0.2 INJECTION INTRAVENOUS AS NEEDED
Status: DISCONTINUED | OUTPATIENT
Start: 2020-12-16 | End: 2020-12-16 | Stop reason: HOSPADM

## 2020-12-16 RX ORDER — OXYCODONE HYDROCHLORIDE AND ACETAMINOPHEN 5; 325 MG/1; MG/1
TABLET ORAL
Qty: 20 TABLET | Refills: 0 | OUTPATIENT
Start: 2020-12-16 | End: 2021-11-11

## 2020-12-16 RX ORDER — DEXAMETHASONE SODIUM PHOSPHATE 10 MG/ML
INJECTION INTRAMUSCULAR; INTRAVENOUS AS NEEDED
Status: DISCONTINUED | OUTPATIENT
Start: 2020-12-16 | End: 2020-12-16 | Stop reason: SURG

## 2020-12-16 RX ORDER — PROMETHAZINE HYDROCHLORIDE 25 MG/1
25 SUPPOSITORY RECTAL ONCE AS NEEDED
Status: COMPLETED | OUTPATIENT
Start: 2020-12-16 | End: 2020-12-16

## 2020-12-16 RX ORDER — SODIUM CHLORIDE 0.9 % (FLUSH) 0.9 %
3-10 SYRINGE (ML) INJECTION AS NEEDED
Status: DISCONTINUED | OUTPATIENT
Start: 2020-12-16 | End: 2020-12-16 | Stop reason: HOSPADM

## 2020-12-16 RX ORDER — ACETAMINOPHEN 500 MG
500 TABLET ORAL ONCE
Status: COMPLETED | OUTPATIENT
Start: 2020-12-16 | End: 2020-12-16

## 2020-12-16 RX ORDER — LIDOCAINE HYDROCHLORIDE 20 MG/ML
INJECTION, SOLUTION INFILTRATION; PERINEURAL AS NEEDED
Status: DISCONTINUED | OUTPATIENT
Start: 2020-12-16 | End: 2020-12-16 | Stop reason: SURG

## 2020-12-16 RX ORDER — LIDOCAINE HYDROCHLORIDE 10 MG/ML
0.5 INJECTION, SOLUTION EPIDURAL; INFILTRATION; INTRACAUDAL; PERINEURAL ONCE AS NEEDED
Status: DISCONTINUED | OUTPATIENT
Start: 2020-12-16 | End: 2020-12-16 | Stop reason: HOSPADM

## 2020-12-16 RX ORDER — ONDANSETRON 2 MG/ML
INJECTION INTRAMUSCULAR; INTRAVENOUS AS NEEDED
Status: DISCONTINUED | OUTPATIENT
Start: 2020-12-16 | End: 2020-12-16 | Stop reason: SURG

## 2020-12-16 RX ORDER — METHYLPREDNISOLONE ACETATE 40 MG/ML
INJECTION, SUSPENSION INTRA-ARTICULAR; INTRALESIONAL; INTRAMUSCULAR; SOFT TISSUE
Status: DISCONTINUED
Start: 2020-12-16 | End: 2020-12-16 | Stop reason: WASHOUT

## 2020-12-16 RX ORDER — OXYCODONE AND ACETAMINOPHEN 7.5; 325 MG/1; MG/1
1 TABLET ORAL ONCE AS NEEDED
Status: COMPLETED | OUTPATIENT
Start: 2020-12-16 | End: 2020-12-16

## 2020-12-16 RX ORDER — DIPHENHYDRAMINE HYDROCHLORIDE 50 MG/ML
12.5 INJECTION INTRAMUSCULAR; INTRAVENOUS
Status: DISCONTINUED | OUTPATIENT
Start: 2020-12-16 | End: 2020-12-16 | Stop reason: HOSPADM

## 2020-12-16 RX ORDER — PROMETHAZINE HYDROCHLORIDE 25 MG/1
25 TABLET ORAL ONCE AS NEEDED
Status: COMPLETED | OUTPATIENT
Start: 2020-12-16 | End: 2020-12-16

## 2020-12-16 RX ORDER — SODIUM CHLORIDE, SODIUM LACTATE, POTASSIUM CHLORIDE, CALCIUM CHLORIDE 600; 310; 30; 20 MG/100ML; MG/100ML; MG/100ML; MG/100ML
9 INJECTION, SOLUTION INTRAVENOUS CONTINUOUS
Status: DISCONTINUED | OUTPATIENT
Start: 2020-12-16 | End: 2020-12-16 | Stop reason: HOSPADM

## 2020-12-16 RX ORDER — CEFAZOLIN SODIUM 2 G/100ML
2 INJECTION, SOLUTION INTRAVENOUS ONCE
Status: COMPLETED | OUTPATIENT
Start: 2020-12-16 | End: 2020-12-16

## 2020-12-16 RX ORDER — FENTANYL CITRATE 50 UG/ML
50 INJECTION, SOLUTION INTRAMUSCULAR; INTRAVENOUS
Status: DISCONTINUED | OUTPATIENT
Start: 2020-12-16 | End: 2020-12-16 | Stop reason: HOSPADM

## 2020-12-16 RX ORDER — ONDANSETRON 2 MG/ML
4 INJECTION INTRAMUSCULAR; INTRAVENOUS ONCE AS NEEDED
Status: DISCONTINUED | OUTPATIENT
Start: 2020-12-16 | End: 2020-12-16 | Stop reason: HOSPADM

## 2020-12-16 RX ORDER — SODIUM CHLORIDE, SODIUM LACTATE, POTASSIUM CHLORIDE, AND CALCIUM CHLORIDE .6; .31; .03; .02 G/100ML; G/100ML; G/100ML; G/100ML
IRRIGANT IRRIGATION AS NEEDED
Status: DISCONTINUED | OUTPATIENT
Start: 2020-12-16 | End: 2020-12-16 | Stop reason: HOSPADM

## 2020-12-16 RX ORDER — LABETALOL HYDROCHLORIDE 5 MG/ML
5 INJECTION, SOLUTION INTRAVENOUS
Status: DISCONTINUED | OUTPATIENT
Start: 2020-12-16 | End: 2020-12-16 | Stop reason: HOSPADM

## 2020-12-16 RX ORDER — DIPHENHYDRAMINE HCL 25 MG
25 CAPSULE ORAL
Status: DISCONTINUED | OUTPATIENT
Start: 2020-12-16 | End: 2020-12-16 | Stop reason: HOSPADM

## 2020-12-16 RX ORDER — FENTANYL CITRATE 50 UG/ML
INJECTION, SOLUTION INTRAMUSCULAR; INTRAVENOUS AS NEEDED
Status: DISCONTINUED | OUTPATIENT
Start: 2020-12-16 | End: 2020-12-16 | Stop reason: SURG

## 2020-12-16 RX ORDER — MIDAZOLAM HYDROCHLORIDE 1 MG/ML
1 INJECTION INTRAMUSCULAR; INTRAVENOUS
Status: DISCONTINUED | OUTPATIENT
Start: 2020-12-16 | End: 2020-12-16 | Stop reason: HOSPADM

## 2020-12-16 RX ADMIN — LIDOCAINE HYDROCHLORIDE 100 MG: 20 INJECTION, SOLUTION INFILTRATION; PERINEURAL at 10:19

## 2020-12-16 RX ADMIN — OXYCODONE HYDROCHLORIDE AND ACETAMINOPHEN 1 TABLET: 7.5; 325 TABLET ORAL at 11:23

## 2020-12-16 RX ADMIN — FENTANYL CITRATE 50 MCG: 50 INJECTION INTRAMUSCULAR; INTRAVENOUS at 10:19

## 2020-12-16 RX ADMIN — DEXAMETHASONE SODIUM PHOSPHATE 8 MG: 10 INJECTION INTRAMUSCULAR; INTRAVENOUS at 10:29

## 2020-12-16 RX ADMIN — MIDAZOLAM 1 MG: 1 INJECTION INTRAMUSCULAR; INTRAVENOUS at 08:38

## 2020-12-16 RX ADMIN — FENTANYL CITRATE 25 MCG: 50 INJECTION INTRAMUSCULAR; INTRAVENOUS at 10:38

## 2020-12-16 RX ADMIN — ONDANSETRON HYDROCHLORIDE 4 MG: 2 SOLUTION INTRAMUSCULAR; INTRAVENOUS at 10:56

## 2020-12-16 RX ADMIN — CEFAZOLIN SODIUM 2 G: 2 INJECTION, SOLUTION INTRAVENOUS at 10:22

## 2020-12-16 RX ADMIN — HYDROMORPHONE HYDROCHLORIDE 0.5 MG: 1 INJECTION, SOLUTION INTRAMUSCULAR; INTRAVENOUS; SUBCUTANEOUS at 11:56

## 2020-12-16 RX ADMIN — SODIUM CHLORIDE, POTASSIUM CHLORIDE, SODIUM LACTATE AND CALCIUM CHLORIDE 9 ML/HR: 600; 310; 30; 20 INJECTION, SOLUTION INTRAVENOUS at 08:38

## 2020-12-16 RX ADMIN — FENTANYL CITRATE 50 MCG: 50 INJECTION INTRAMUSCULAR; INTRAVENOUS at 11:12

## 2020-12-16 RX ADMIN — FENTANYL CITRATE 50 MCG: 50 INJECTION INTRAMUSCULAR; INTRAVENOUS at 11:18

## 2020-12-16 RX ADMIN — PROPOFOL 160 MG: 10 INJECTION, EMULSION INTRAVENOUS at 10:19

## 2020-12-16 RX ADMIN — PROMETHAZINE HYDROCHLORIDE 25 MG: 25 TABLET ORAL at 11:23

## 2020-12-16 RX ADMIN — ACETAMINOPHEN 500 MG: 500 TABLET, FILM COATED ORAL at 08:38

## 2020-12-16 RX ADMIN — FENTANYL CITRATE 25 MCG: 50 INJECTION INTRAMUSCULAR; INTRAVENOUS at 10:30

## 2020-12-16 RX ADMIN — KETOROLAC TROMETHAMINE 30 MG: 30 INJECTION, SOLUTION INTRAMUSCULAR; INTRAVENOUS at 10:56

## 2020-12-16 NOTE — ANESTHESIA POSTPROCEDURE EVALUATION
Patient: Taty Chacko    Procedure Summary     Date: 12/16/20 Room / Location:  [facility]  [facility]    Anesthesia Start: 1012 Anesthesia Stop: 1116    Procedure: RIGHT KNEE ARTHROSCOPY, DEBRIDEMENT OF PATELLA (Right Knee) Diagnosis:       Chronic pain of right knee      (Chronic pain of right knee [M25.561, G89.29])    Surgeon: Raissa Hodges MD Provider: Kamari Torres MD    Anesthesia Type: general ASA Status: 1          Anesthesia Type: general    Vitals  Vitals Value Taken Time   /77 12/16/20 1200   Temp 36.9 °C (98.4 °F) 12/16/20 1114   Pulse 66 12/16/20 1214   Resp 16 12/16/20 1200   SpO2 98 % 12/16/20 1215   Vitals shown include unvalidated device data.        Post Anesthesia Care and Evaluation    Patient location during evaluation: bedside  Patient participation: complete - patient participated  Level of consciousness: awake and alert  Pain management: adequate  Airway patency: patent  Anesthetic complications: No anesthetic complications  PONV Status: controlled  Cardiovascular status: blood pressure returned to baseline and acceptable  Respiratory status: acceptable  Hydration status: acceptable

## 2020-12-16 NOTE — OP NOTE
Operative Note      Facility: [facility]  Patient Name: Taty Chacko  YOB: 1995  Date: 12/16/2020  Medical Record Number: [id number]      Pre-op Diagnosis:   Chronic pain of right knee [M25.561, G89.29]  History of patella dislocation  Post-Op Diagnosis Codes:     * Chronic pain of right knee [M25.561, G89.29]    Procedure(s):  RIGHT KNEE ARTHROSCOPY, DEBRIDEMENT OF PATELLA    Surgeon(s):  Raissa Hodges MD    Anesthesia: General  Anesthesiologist: Kamari Torres MD  CRNA: Gissell Alston CRNA    Staff:   Circulator: Sierra June RN  Scrub Person: Aaron Maldonado    Assistants : none      Estimated Blood Loss: 5 cc    Specimens:    none     Drains: None    Findings: See Dictation    Complications: None      Indication for procedure: This patient has had a several month history of knee pain and has an exam and an MRI which are consistent with meniscal pathology. They understand all options and wished to proceed with arthroscopy.      Description of procedure: The patient was taken to the operating room. They were placed supine on the operating room table. After induction of adequate LMA anesthesia, IV antibiotics the underwent exam under anesthesia was symmetric full range of motion. Nonsterile tourniquet was applied patient was placed in the thigh reno all prominent areas well padded and into the table dropped. The leg was prepped and draped in usual sterile fashion. Standard lateral incision was made with 11 blade. Blunt trocar penetrated into the joint scope followed in the evaluation began the patella appeared to sit just lateral in the trochlear groove but did centralize that she did have an injury to the cartilage on the distal medial aspect of this.  I then entered the medial compartment under spinal needle localization direct visualization a medial portal was established.  Her medial meniscus medial femoral condyle medial tibial plateau were normal.  The notch was  normal the lateral compartment was normal.  Then turned my attention patellofemoral joint gently debrided the distal and medial aspect of the patella.             At this point everything was thoroughly irrigated it was suctioned all 3 compartments, the gutters the suprapatellar pouch were all evaluated there was no further acute pathology seen.  Everything was thoroughly irrigated it was injected with Marcaine Depo-Medrol.  The portals were closed with 3-0 nylon interrupted fashion.  Sterile dressings and Ace wraps were applied.  The patient tolerated the procedure well and was taken to recovery room in good condition.  All sponge and needle count were correct                    Date: 12/16/2020  Time: 11:31 EST

## 2020-12-16 NOTE — H&P
History & Physical       Patient: Taty Chacko    Date of Admission: 12/16/2020  7:42 AM    YOB: 1995    Medical Record Number: [id number]    Attending Physician: Raissa Hodges MD        Chief Complaints: Chronic pain of right knee [M25.561, G89.29]      History of Present Illness: This patient has a several month history of right knee pain following a patella dislocation at work appears to have a stable patella MPFL appears intact but she has persistent pain is limiting her activity.  Her MRI shows some chondromalacia of the patella.  In view of her persistence of symptoms I suspect she is going to have a patellar cartilage defect she presents for arthroscopy     Allergies:   Allergies   Allergen Reactions   • Hydrocodone Itching and Rash       Medications:   Home Medications:  No current facility-administered medications on file prior to encounter.      Current Outpatient Medications on File Prior to Encounter   Medication Sig   • hydrOXYzine (ATARAX) 25 MG tablet Take 25-50 mg by mouth Every 6 (Six) Hours As Needed.   • ibuprofen (ADVIL,MOTRIN) 200 MG tablet Take 200 mg by mouth Every 6 (Six) Hours As Needed.   • levonorgestrel (KYLEENA) 19.5 MG intrauterine device IUD 1 each by Intrauterine route 1 (One) Time.   • minocycline (MINOCIN,DYNACIN) 50 MG capsule Take 50 mg by mouth 2 (Two) Times a Day.   • Multiple Vitamins-Minerals (MULTIVITAMIN ADULT PO) Take 1 tablet by mouth Daily.   • topiramate (TOPAMAX) 50 MG tablet Take 1 tablet by mouth 2 (Two) Times a Day.     Current Medications:  Scheduled Meds:ceFAZolin, 2 g, Intravenous, Once      Continuous Infusions:   PRN Meds:.    Past Medical History:   Diagnosis Date   • Anxiety    • Arthritis    • Kidney infection    • Kidney stone    • MCL sprain of left knee    • Migraine         Past Surgical History:   Procedure Laterality Date   • NO PAST SURGERIES          Social History     Occupational History   • Not on file   Tobacco Use   •  Smoking status: Never Smoker   • Smokeless tobacco: Never Used   Substance and Sexual Activity   • Alcohol use: Yes     Alcohol/week: 4.0 standard drinks     Types: 4 Glasses of wine per week     Comment: social   • Drug use: No   • Sexual activity: Defer      Social History     Social History Narrative   • Not on file        Family History   Problem Relation Age of Onset   • Migraines Paternal Aunt    • Stroke Paternal Grandfather    • Malig Hyperthermia Neg Hx        Review of Systems      Physical Exam: 25 y.o. female  General Appearance:    Alert, cooperative, in no acute distress                    There were no vitals filed for this visit.     Head:    Normocephalic, without obvious abnormality, atraumatic   Eyes:            conjunctivae and sclerae normal, no pallor, corneas clear,    Ears:    Ears appear intact with no abnormalities noted   Throat:   No oral lesions, no thrush, oral mucosa moist   Neck:   No adenopathy, supple, trachea midline, no thyromegaly,    Back:     No kyphosis present, no scoliosis present, no skin lesions,      erythema or scars, no tenderness to percussion or                   palpation,   range of motion normal   Lungs:     Clear to auscultation,respirations regular, even and                  unlabored    Heart:    Regular rhythm and normal rate               Chest Wall:    No abnormalities observed   Abdomen:     Normal bowel sounds, no masses, no organomegaly, soft        non-tender, non-distended, no guarding, no rebound                tenderness   Rectal:     Deferred   Extremities:    Moves all extremities well, no edema,   no cyanosis, no redness   Pulses:   Pulses palpable and equal bilaterally   Skin:   No bleeding, bruising or rash   Lymph nodes:   No palpable adenopathy   Neurologic:   Appears neurologic intact             Assessment:  Patient Active Problem List   Diagnosis   • Headache, migraine   • Migraine without aura with status migrainosus   • Chronic pain of  right knee           Plan: All risks, benefits and alternatives were discussed.  Risks including to but not exclusive to anesthetic complications, including death, MI, CVA, infection, bleeding DVT, PE,  fracture, residual pain and need for future surgery.  Patient understood all and agrees to proceed.

## 2020-12-16 NOTE — ANESTHESIA PREPROCEDURE EVALUATION
Anesthesia Evaluation     Patient summary reviewed and Nursing notes reviewed   no history of anesthetic complications:  NPO Solid Status: > 8 hours  NPO Liquid Status: > 2 hours           Airway   Mallampati: II  TM distance: >3 FB  Neck ROM: full  No difficulty expected  Dental - normal exam     Pulmonary     breath sounds clear to auscultation  Cardiovascular   Exercise tolerance: good (4-7 METS)    Rhythm: regular  Rate: normal        Neuro/Psych  GI/Hepatic/Renal/Endo      Musculoskeletal     Abdominal    Substance History      OB/GYN          Other                        Anesthesia Plan    ASA 1     general     intravenous induction     Anesthetic plan, all risks, benefits, and alternatives have been provided, discussed and informed consent has been obtained with: patient.    Plan discussed with CRNA.

## 2020-12-16 NOTE — ANESTHESIA PROCEDURE NOTES
Airway  Urgency: elective    Date/Time: 12/16/2020 10:20 AM  Airway not difficult    General Information and Staff    Patient location during procedure: OR  Anesthesiologist: Kamari Torres MD  CRNA: Gissell Alston CRNA    Indications and Patient Condition  Indications for airway management: airway protection    Preoxygenated: yes  MILS not maintained throughout  Mask difficulty assessment: 1 - vent by mask    Final Airway Details  Final airway type: supraglottic airway      Successful airway: classic and LMA  Size 4    Number of attempts at approach: 1  Assessment: lips, teeth, and gum same as pre-op    Additional Comments  Inflated to seal.

## 2020-12-28 ENCOUNTER — OFFICE VISIT (OUTPATIENT)
Dept: ORTHOPEDIC SURGERY | Facility: CLINIC | Age: 25
End: 2020-12-28

## 2020-12-28 VITALS — BODY MASS INDEX: 27.32 KG/M2 | TEMPERATURE: 97.5 F | WEIGHT: 170 LBS | HEIGHT: 66 IN

## 2020-12-28 DIAGNOSIS — Z98.890 S/P ARTHROSCOPY OF KNEE: Primary | ICD-10-CM

## 2020-12-28 PROCEDURE — 99024 POSTOP FOLLOW-UP VISIT: CPT | Performed by: ORTHOPAEDIC SURGERY

## 2020-12-28 NOTE — PROGRESS NOTES
Right Knee Scope follow Up 1st Visit      Patient: Taty Chacko        YOB: 1995      Chief Complaints: right  knee pain      History of Present Illness: Pt is here f/u knee arthroscopy she states she doing well her knee does feel better she like to return to work in 2 more weeks with limited capacity        Allergies:   Allergies   Allergen Reactions   • Hydrocodone Itching and Rash       Medications:   Home Medications:  Current Outpatient Medications on File Prior to Visit   Medication Sig   • hydrOXYzine (ATARAX) 25 MG tablet Take 25-50 mg by mouth Every 6 (Six) Hours As Needed.   • ibuprofen (ADVIL,MOTRIN) 200 MG tablet Take 200 mg by mouth Every 6 (Six) Hours As Needed.   • levonorgestrel (KYLEENA) 19.5 MG intrauterine device IUD 1 each by Intrauterine route 1 (One) Time.   • minocycline (MINOCIN,DYNACIN) 50 MG capsule Take 50 mg by mouth 2 (Two) Times a Day.   • Multiple Vitamins-Minerals (MULTIVITAMIN ADULT PO) Take 1 tablet by mouth Daily.   • oxyCODONE-acetaminophen (PERCOCET) 5-325 MG per tablet Take 1 to 2 tablets by mouth every 4 hours as needed for severe pain.   • topiramate (TOPAMAX) 50 MG tablet Take 1 tablet by mouth 2 (Two) Times a Day.     No current facility-administered medications on file prior to visit.      Current Medications:  Scheduled Meds:  Continuous Infusions:No current facility-administered medications for this visit.     PRN Meds:.          Physical Exam: 25 y.o. female  General Appearance:    Alert, cooperative, in no acute distress                 There were no vitals filed for this visit.   Patient is alert and oriented ×3 no acute distress normal mood physical exam.  Physical exam of the knee, incisions looked good there is no erythema, calf is soft and non-tender.  No sign or sx of DVT      Assessment  S/P knee scope.  I did review intraoperative findings and arthroscopic pictures with the patient.          Plan: To remove sutures today place Steri-Strips and  start into  physical therapy and I will have thrm follow up in 4 weeks.  Think she can return to work in 2 weeks with the restrictions outlined in her return to work note I want her to continue to wear her J brace continue to work on quad and core strengthening

## 2020-12-29 ENCOUNTER — TREATMENT (OUTPATIENT)
Dept: PHYSICAL THERAPY | Facility: CLINIC | Age: 25
End: 2020-12-29

## 2020-12-29 DIAGNOSIS — M25.561 ACUTE PAIN OF RIGHT KNEE: Primary | ICD-10-CM

## 2020-12-29 DIAGNOSIS — R26.2 DIFFICULTY WALKING: ICD-10-CM

## 2020-12-29 DIAGNOSIS — S83.004D DISLOCATION OF RIGHT PATELLA, SUBSEQUENT ENCOUNTER: ICD-10-CM

## 2020-12-29 DIAGNOSIS — Z98.890 S/P ARTHROSCOPY OF RIGHT KNEE: ICD-10-CM

## 2020-12-29 DIAGNOSIS — Z74.09 IMPAIRED FUNCTIONAL MOBILITY AND ACTIVITY TOLERANCE: ICD-10-CM

## 2020-12-29 PROCEDURE — 97162 PT EVAL MOD COMPLEX 30 MIN: CPT | Performed by: PHYSICAL THERAPIST

## 2020-12-29 PROCEDURE — 97110 THERAPEUTIC EXERCISES: CPT | Performed by: PHYSICAL THERAPIST

## 2020-12-29 NOTE — PROGRESS NOTES
Physical Therapy Initial Evaluation and Plan of Care      Patient: Taty Chacko   : 1995  Diagnosis/ICD-10 Code:  Acute pain of right knee [M25.561]  Referring practitioner: Raissa Hodges MD  Date of Initial Visit: 2020  Today's Date: 2020  Patient seen for 1 sessions           Subjective Evaluation    History of Present Illness  Date of onset: 2020  Date of surgery: 2020  Mechanism of injury: Pt is a . She had an incident at work in Aug with a large dog that caused her to dislocate her R patella. She did therapy for quite some time, but never was at 100%. She had a knee scope on . Had cartilage debridement. She used crutches for 2-3 days. She is wearing her J brace again. Pain is starting to get better. She is off work for another 2 weeks.       Patient Occupation:  Quality of life: good    Pain  Current pain ratin  At best pain ratin  At worst pain ratin  Location: R knee  Quality: dull ache and sharp (sharp pains around the medial incision)  Relieving factors: ice (ibuprofen, only has taken pain meds 1-2 times over the past week,)  Aggravating factors: squatting, stairs, standing and ambulation    Social Support  Lives in: apartment (on the 2nd floor)  Lives with: significant other    Treatments  Previous treatment: physical therapy and injection treatment  Patient Goals  Patient goals for therapy: decreased edema, decreased pain, increased motion, increased strength, independence with ADLs/IADLs, return to sport/leisure activities and return to work             Objective          Observations     Right Knee   Positive for edema and incision.       Tenderness     Right Knee   Tenderness in the medial joint line and medial patella.     Active Range of Motion   Left Knee   Flexion: 132 degrees   Extension: 8 (of HE) degrees     Right Knee   Flexion: 110 degrees   Extension: 8 degrees     Strength/Myotome Testing     Left Hip   Planes of Motion    Flexion: 5  Abduction: 5  External rotation: 5    Right Hip   Planes of Motion   Flexion: 4  Abduction: 4  External rotation: 4    Left Knee   Flexion: 5  Extension: 5    Right Knee   Flexion: 4-  Extension: 4-    Ambulation     Comments   Mild antalgia with decreased TKE in stance phase        See Exercise, Manual, and Modality Logs for complete treatment.     Functional outcome score: Knee Outcome Survey=52.5%    Exercises:  -quad set 10x 5 sec hold, towel under the knee  -SLR and SL hip abd, 2x10  -prone hip ext 2x10  -supine heel slides with strap, 10x 10 sec hold  -seated hamstring curls with red band, x20  -long sitting hamstring and calf stretching, 3x20 sec        Assessment & Plan     Assessment  Impairments: abnormal gait, abnormal or restricted ROM, activity intolerance, impaired physical strength, pain with function and weight-bearing intolerance  Assessment details: Taty Chacko is a 25 y.o. year-old female referred to physical therapy for right knee pain following R knee arthroscopy on 2020. She presents with a evolving clinical presentation.  She has comorbidities of previous dislocation and personal factors of a physical job that may affect her progress in the plan of care.  Pt has decreased ROM in ext=8 deg and flex=110 deg, decreased strength 4-/5, and decreased flexibility of the HS and gastroc/soleus complex. Pt would benefit from therapy to help improve her ability to walk, stand, squat, and perform stairs to help her return to work.  Prognosis: good  Functional Limitations: sleeping, walking, sitting, standing and unable to perform repetitive tasks  Goals  Plan Goals: ST wks  1. Patient will be independent with education for symptom management, joint protection and strategies to minimize stress on affected tissues  2. Pt to improve R knee ROM to 0-120 deg for improved gait pattern    LT wks  1. Pt to improve R knee ROM to 5 deg of HE to 130 deg for improved gait pattern  2.  Pt to improve score on Knee Outcome Survey from 52.5% to 80% for overall functional improvement  3. Patient will increase knee strength to 4+ to 5/5 to improve functional mobility  4. Patient will negotiate stairs reciprocally with rail support as needed  5. Patient will demonstrate an independent HEP for core and knee strength and flexibility/ROM.        Plan  Therapy options: will be seen for skilled physical therapy services  Planned modality interventions: cryotherapy, ultrasound and TENS  Planned therapy interventions: ADL retraining, balance/weight-bearing training, flexibility, functional ROM exercises, gait training, home exercise program, IADL retraining, joint mobilization, manual therapy, motor coordination training, neuromuscular re-education, soft tissue mobilization, strengthening, stretching, therapeutic activities and transfer training  Frequency: 2x week  Duration in weeks: 12  Treatment plan discussed with: patient        Timed:  Manual Therapy:         mins  86376;  Therapeutic Exercise:    20     mins  32955;     Neuromuscular Cynthia:        mins  42615;    Therapeutic Activity:          mins  50410;     Gait Training:           mins  35050;     Ultrasound:          mins  63945;    Electrical Stimulation:         mins  89440 ( );  Iontophoresis         mins 41987  Dry Needling        mins      Untimed:  Electrical Stimulation:         mins  58140 (MC );  Mechanical Traction:         mins  67458;     Timed Treatment:   20   mins   Total Treatment:     40   mins    PT SIGNATURE: Dia Austin, PT   DATE TREATMENT INITIATED: 12/29/2020    Initial Certification  Certification Period: 3/29/2021  I certify that the therapy services are furnished while this patient is under my care.  The services outlined above are required by this patient, and will be reviewed every 90 days.     PHYSICIAN: Raissa Hodges MD      DATE:     Please sign and return via fax to [phone] Thank you,  [facility].

## 2021-01-05 ENCOUNTER — TREATMENT (OUTPATIENT)
Dept: PHYSICAL THERAPY | Facility: CLINIC | Age: 26
End: 2021-01-05

## 2021-01-05 DIAGNOSIS — Z98.890 S/P ARTHROSCOPY OF RIGHT KNEE: ICD-10-CM

## 2021-01-05 DIAGNOSIS — Z74.09 IMPAIRED FUNCTIONAL MOBILITY AND ACTIVITY TOLERANCE: ICD-10-CM

## 2021-01-05 DIAGNOSIS — S83.004D DISLOCATION OF RIGHT PATELLA, SUBSEQUENT ENCOUNTER: ICD-10-CM

## 2021-01-05 DIAGNOSIS — R26.2 DIFFICULTY WALKING: ICD-10-CM

## 2021-01-05 DIAGNOSIS — M25.561 ACUTE PAIN OF RIGHT KNEE: Primary | ICD-10-CM

## 2021-01-05 PROCEDURE — 97110 THERAPEUTIC EXERCISES: CPT | Performed by: PHYSICAL THERAPIST

## 2021-01-05 NOTE — PROGRESS NOTES
Physical Therapy Daily Progress Note    Visit # : 2  Taty Chacko reports: my knee is feeling better; just some mild pain with quad set on the inside of my knee.  It's still hard to walk my dog; I wear out easily    Subjective     Objective   See Exercise, Manual, and Modality Logs for complete treatment.     Exercises:  -NuStep level 4, 6 min  -quad set 10x 5 sec hold, towel under the knee  -SLR and SL hip abd, 2x10  -prone hip ext 2x10  -supine heel slides with strap, 10x 10 sec hold   -hip add iso with ball, 2x10 for 5 sec hold  -bridge w/ball squeeze 2x10  -seated hamstring curls with red band, x20  -TKE with red band, x20  -long sitting hamstring and calf stretching, 3x20 sec    Assessment/Plan  Good tolerance to ex progression demonstrating good form/knee alignment.  Pt notes that she has printouts of progressions at home from prehab. All exercises performed in brace to reinforce good patellar tracking.   Progress strengthening /stabilization /functional activity  Consider low step activities if tolerated.      Timed:  Manual Therapy:    -     mins  30031;  Therapeutic Exercise:    35     mins  60040;     Neuromuscular Cynthia:    -    mins  15646;    Therapeutic Activity:     -     mins  10702;     Gait Training:      -     mins  85239;     Ultrasound:     -     mins  61417;    Iontophoresis                 -     mins 53320    Timed Treatment:   35   mins   Total Treatment:     35   mins      Emiliana Tinajero PT  Physical Therapist  [place] License # [id number]

## 2021-01-08 ENCOUNTER — TREATMENT (OUTPATIENT)
Dept: PHYSICAL THERAPY | Facility: CLINIC | Age: 26
End: 2021-01-08

## 2021-01-08 DIAGNOSIS — R26.2 DIFFICULTY WALKING: ICD-10-CM

## 2021-01-08 DIAGNOSIS — Z74.09 IMPAIRED FUNCTIONAL MOBILITY AND ACTIVITY TOLERANCE: ICD-10-CM

## 2021-01-08 DIAGNOSIS — M25.561 ACUTE PAIN OF RIGHT KNEE: Primary | ICD-10-CM

## 2021-01-08 DIAGNOSIS — Z98.890 S/P ARTHROSCOPY OF RIGHT KNEE: ICD-10-CM

## 2021-01-08 DIAGNOSIS — S83.004D DISLOCATION OF RIGHT PATELLA, SUBSEQUENT ENCOUNTER: ICD-10-CM

## 2021-01-08 PROCEDURE — 97035 APP MDLTY 1+ULTRASOUND EA 15: CPT | Performed by: PHYSICAL THERAPIST

## 2021-01-08 PROCEDURE — 97110 THERAPEUTIC EXERCISES: CPT | Performed by: PHYSICAL THERAPIST

## 2021-01-08 NOTE — PROGRESS NOTES
Physical Therapy Daily Progress Note    Patient: Taty Chacko   : 1995  Diagnosis/ICD-10 Code:  Acute pain of right knee [M25.561]  Referring practitioner: Raissa Hodges MD  Date of Initial Visit: Type: THERAPY  Noted: 2020  Today's Date: 2021  Patient seen for 3 sessions           Subjective Still having issues on stairs, especially going down. Gets painful when up on it too long    Objective   See Exercise, Manual, and Modality Logs for complete treatment.     Exercises:  -NuStep level 4, 6 min  -1.5 inch step ups, x20  -quad set 10x 5 sec hold, towel under the knee  -SLR and SL hip abd, 2x10  -prone hip ext 2x10  -supine heel slides with strap, 10x 10 sec hold   -hip add iso with ball, 2x10 for 5 sec hold  -bridge w/ball squeeze 2x10  -seated hamstring curls with red band, x20  -TKE with red band, x20  -long sitting hamstring and calf stretching, 3x20 sec    Assessment/Plan  Pt is having less pain overall. She does get sore with prolonged standing (trying to do a little housework) or with taking a short walk with her dog. Did a trial of US to help with pain and healing (started with 100% duty cycle and had to drop to 50% pulsed due to some mild discomfort).         Timed:    Manual Therapy:         mins  11467;  Therapeutic Exercise:    30     mins  33896;     Neuromuscular Cynthia:        mins  92705;    Therapeutic Activity:          mins  06883;     Gait Training:           mins  70665;     Ultrasound:     10     mins  70115;    Electrical Stimulation:         mins  88246 ( );  Iontophoresis         mins 63674;  Aquatic Therapy         mins 81907;  Dry Needling                   mins    Untimed:  Electrical Stimulation:         mins  98201 ( );  Mechanical Traction:         mins  33894;     Timed Treatment:   40   mins   Total Treatment:     40   mins  Dia Austin, PT  Physical Therapist

## 2021-01-11 ENCOUNTER — TREATMENT (OUTPATIENT)
Dept: PHYSICAL THERAPY | Facility: CLINIC | Age: 26
End: 2021-01-11

## 2021-01-11 DIAGNOSIS — R26.2 DIFFICULTY WALKING: ICD-10-CM

## 2021-01-11 DIAGNOSIS — S83.004D DISLOCATION OF RIGHT PATELLA, SUBSEQUENT ENCOUNTER: ICD-10-CM

## 2021-01-11 DIAGNOSIS — Z98.890 S/P ARTHROSCOPY OF RIGHT KNEE: ICD-10-CM

## 2021-01-11 DIAGNOSIS — Z74.09 IMPAIRED FUNCTIONAL MOBILITY AND ACTIVITY TOLERANCE: ICD-10-CM

## 2021-01-11 DIAGNOSIS — M25.561 ACUTE PAIN OF RIGHT KNEE: Primary | ICD-10-CM

## 2021-01-11 PROCEDURE — 97530 THERAPEUTIC ACTIVITIES: CPT | Performed by: PHYSICAL THERAPIST

## 2021-01-11 PROCEDURE — 97110 THERAPEUTIC EXERCISES: CPT | Performed by: PHYSICAL THERAPIST

## 2021-01-11 PROCEDURE — 97035 APP MDLTY 1+ULTRASOUND EA 15: CPT | Performed by: PHYSICAL THERAPIST

## 2021-01-11 NOTE — PROGRESS NOTES
Physical Therapy Daily Progress Note    Patient: Taty Chacko   : 1995  Diagnosis/ICD-10 Code:  Acute pain of right knee [M25.561]  Referring practitioner: Raissa Hodges MD  Date of Initial Visit: Type: THERAPY  Noted: 2020  Today's Date: 2021  Patient seen for 4 sessions           Subjective It just feels like it is bruised    Objective   See Exercise, Manual, and Modality Logs for complete treatment.     Exercises:  -NuStep level 4, 6 min  -4 inch step up fwd and lateral, x10  -1.5 inch step down, x10  -quad set 10x 5 sec hold, towel under the knee  -SLR and SL hip abd, 2x10  -prone hip ext 2x10  -supine heel slides with strap, 10x 10 sec hold defer  -hip add iso with ball, 2x10 for 5 sec hold  -bridge w/ball squeeze 2x10  -seated hamstring curls with red band, x20  -TKE with red band, x20  -long sitting hamstring and calf stretching, 3x20 sec    Assessment/Plan  Pt has been starting to ascend the stairs at home with a reciprocal pattern now. She has pain with descending. She is only taking OTC meds PRN for pain, either ibuprofen or Tylenol, with a bruised feeling in the medial knee         Timed:    Manual Therapy:         mins  85759;  Therapeutic Exercise:    25     mins  28920;     Neuromuscular Cynthia:        mins  10204;    Therapeutic Activity:     10     mins  26213;     Gait Training:           mins  17558;     Ultrasound:     9     mins  71018;    Electrical Stimulation:         mins  28861 ( );  Iontophoresis         mins 74446;  Aquatic Therapy         mins 34640;  Dry Needling                   mins    Untimed:  Electrical Stimulation:         mins  20919 ( );  Mechanical Traction:         mins  91504;     Timed Treatment:   44   mins   Total Treatment:     44   mins  Dia Austin, PT  Physical Therapist

## 2021-01-12 ENCOUNTER — TELEPHONE (OUTPATIENT)
Dept: ORTHOPEDIC SURGERY | Facility: CLINIC | Age: 26
End: 2021-01-12

## 2021-01-12 NOTE — TELEPHONE ENCOUNTER
Updated [facility]. This was actually already faxed to [facility] on 12- so today was a refax.

## 2021-01-12 NOTE — TELEPHONE ENCOUNTER
Provider: BERT NEFF  Caller: [facility] [facility]  Relationship to Patient:      Phone Number: [phone]  Reason for Call: [facility] [employer] [facility]- CALLING FOR PATIENTS MOST RECENT VISIT INFO-  When was the patient last seen: 12/28/21

## 2021-01-13 ENCOUNTER — TREATMENT (OUTPATIENT)
Dept: PHYSICAL THERAPY | Facility: CLINIC | Age: 26
End: 2021-01-13

## 2021-01-13 DIAGNOSIS — Z98.890 S/P ARTHROSCOPY OF RIGHT KNEE: ICD-10-CM

## 2021-01-13 DIAGNOSIS — M25.561 ACUTE PAIN OF RIGHT KNEE: Primary | ICD-10-CM

## 2021-01-13 DIAGNOSIS — S83.004D DISLOCATION OF RIGHT PATELLA, SUBSEQUENT ENCOUNTER: ICD-10-CM

## 2021-01-13 DIAGNOSIS — R26.2 DIFFICULTY WALKING: ICD-10-CM

## 2021-01-13 DIAGNOSIS — Z74.09 IMPAIRED FUNCTIONAL MOBILITY AND ACTIVITY TOLERANCE: ICD-10-CM

## 2021-01-13 PROCEDURE — 97110 THERAPEUTIC EXERCISES: CPT | Performed by: PHYSICAL THERAPIST

## 2021-01-13 PROCEDURE — 97035 APP MDLTY 1+ULTRASOUND EA 15: CPT | Performed by: PHYSICAL THERAPIST

## 2021-01-13 NOTE — PROGRESS NOTES
Physical Therapy Daily Progress Note    Patient: Taty Chacko   : 1995  Diagnosis/ICD-10 Code:  Acute pain of right knee [M25.561]  Referring practitioner: Raissa Hodges MD  Date of Initial Visit: Type: THERAPY  Noted: 2020  Today's Date: 2021  Patient seen for 5 sessions           Subjective It's been pretty sore getting back to work. It has been a lot of getting up and down from a chair/stool.    Objective   See Exercise, Manual, and Modality Logs for complete treatment.     Exercises:  -NuStep level 4, 6 min  -4 inch step up fwd and lateral, x20  -1.5 inch step down, x15  -[place] single leg press, 85#,   -SLR and SL hip abd, 2x10  -prone hip ext 2x10  -hip add iso with ball, 2x10 for 5 sec hold  -bridge w/ball squeeze 2x10  -seated hamstring curls with red band, x20  -TKE with red band, x20  -long sitting hamstring and calf stretching, 3x20 sec    Assessment/Plan  Pain has increased since starting back to work. She has been sitting during surgeries/cases, but has to keep getting up and down. Pain 5-6/10 and it radiates down the leg. Pain is in the medial knee and some into the VMO         Timed:    Manual Therapy:         mins  68187;  Therapeutic Exercise:    33     mins  85830;     Neuromuscular Cynthia:        mins  74073;    Therapeutic Activity:          mins  80384;     Gait Training:           mins  23841;     Ultrasound:     9     mins  07064;    Electrical Stimulation:         mins  35161 ( );  Iontophoresis         mins 84040;  Aquatic Therapy         mins 51776;  Dry Needling                   mins    Untimed:  Electrical Stimulation:         mins  65291 ( );  Mechanical Traction:         mins  59213;     Timed Treatment:   42   mins   Total Treatment:     42   mins  Dia Austin, PT  Physical Therapist

## 2021-01-18 ENCOUNTER — TREATMENT (OUTPATIENT)
Dept: PHYSICAL THERAPY | Facility: CLINIC | Age: 26
End: 2021-01-18

## 2021-01-18 DIAGNOSIS — R26.2 DIFFICULTY WALKING: ICD-10-CM

## 2021-01-18 DIAGNOSIS — Z98.890 S/P ARTHROSCOPY OF RIGHT KNEE: ICD-10-CM

## 2021-01-18 DIAGNOSIS — Z74.09 IMPAIRED FUNCTIONAL MOBILITY AND ACTIVITY TOLERANCE: ICD-10-CM

## 2021-01-18 DIAGNOSIS — Z98.890 S/P ARTHROSCOPY OF KNEE: Primary | ICD-10-CM

## 2021-01-18 DIAGNOSIS — M25.561 ACUTE PAIN OF RIGHT KNEE: Primary | ICD-10-CM

## 2021-01-18 DIAGNOSIS — S83.004D DISLOCATION OF RIGHT PATELLA, SUBSEQUENT ENCOUNTER: ICD-10-CM

## 2021-01-18 PROCEDURE — 97530 THERAPEUTIC ACTIVITIES: CPT | Performed by: PHYSICAL THERAPIST

## 2021-01-18 PROCEDURE — 97110 THERAPEUTIC EXERCISES: CPT | Performed by: PHYSICAL THERAPIST

## 2021-01-18 NOTE — PROGRESS NOTES
30-Day / 10-Visit Progress Note         Patient: Taty Chacko   : 1995  Diagnosis/ICD-10 Code:  Acute pain of right knee [M25.561]  Referring practitioner: Raissa Hodges MD  Date of Initial Visit: Type: THERAPY  Noted: 2020  Today's Date: 2021  Patient seen for 6 sessions      Subjective:     Clinical Progress: improved  Home Program Compliance: Yes  Treatment has included:  therapeutic exercise, therapeutic activity, neuro-muscular retraining , gait training, ultrasound and patient education with home exercise program     Subjective Evaluation    Pain  Current pain ratin  At best pain ratin  At worst pain ratin (at the end of the work week)  Location: R knee         Objective          Active Range of Motion     Right Knee   Flexion: 125 degrees   Extension: 5 (of HE) degrees       See Exercise, Manual, and Modality Logs for complete treatment.     Functional outcome score: Knee Outcome Survey=59/80 or 73.75%    Exercises:  -NuStep level 5, 6 min  -4 inch step up fwd and lateral, x20  -1.5 inch step down, x15  -[place] single leg press, 100#,   -SLR and SL hip abd, 2x10, 1#  -prone hip ext 2x10, 1#  -hip add iso with ball, 2x10 for 5 sec hold  -bridge w/ball squeeze 2x10  -seated hamstring curls with red band, x20  -TKE with red band, x20  -long sitting hamstring and calf stretching, 3x20 sec    Assessment & Plan     Assessment  Assessment details: Taty Chacko has been seen for 6 physical therapy sessions for s/p R knee scope.  Treatment has included therapeutic exercise, therapeutic activity, neuro-muscular retraining , gait training, ultrasound and patient education with home exercise program . Progress to physical therapy goals is good. She has improved her ROM to-125 deg, strength 4 to 4+/5 in the hip and knee, and her pain at rest is better. Her pain by the end of the work week has been up to 7/10. She still has issues with going down stairs, squatting or stooping, getting  up/down from the floor, and can not kneel. She will benefit from continued skilled physical therapy to address remaining impairments and functional limitations.     Prognosis: good    Goals  Plan Goals: ST wks  1. Patient will be independent with education for symptom management, joint protection and strategies to minimize stress on affected tissues (PART MET)   2. Pt to improve R knee ROM to 0-120 deg for improved gait pattern (MET)     LT wks  1. Pt to improve R knee ROM to 5 deg of HE to 130 deg for improved gait pattern (ONGOING) (PART MET)   2. Pt to improve score on Knee Outcome Survey from 52.5% to 80% for overall functional improvement (ONGOING) improved to 73.75%  3. Patient will increase knee strength to 4+ to 5/5 to improve functional mobility (ONGOING) progressing  4. Patient will negotiate stairs reciprocally with rail support as needed (PART MET) for ascending  5. Patient will demonstrate an independent HEP for core and knee strength and flexibility/ROM.           Recommendations: Continue as planned  Timeframe: 6 weeks, 2x a week  Prognosis to achieve goals: good    PT Signature: Dia Austin, PT      Based upon review of the patient's progress and continued therapy plan, it is my medical opinion that Taty Chacko should continue physical therapy treatment at [facility]  [address] [place] [address] [address] [place] [address] [phone].    Signature: __________________________________  Raissa Hodges MD    Timed:  Manual Therapy:         mins  52018;  Therapeutic Exercise:    25     mins  18888;     Neuromuscular Cynthia:        mins  63210;    Therapeutic Activity:     15     mins  00719;     Gait Training:           mins  22506;     Ultrasound:          mins  44946;    Electrical Stimulation:         mins  32133 ( );  Iontophoresis         mins 64342;  Dry Needling                   mins    Untimed:  Electrical Stimulation:          mins  18940 ( );  Mechanical Traction:         mins  97012;     Timed Treatment:   40   mins   Total Treatment:     40   mins

## 2021-01-20 ENCOUNTER — TREATMENT (OUTPATIENT)
Dept: PHYSICAL THERAPY | Facility: CLINIC | Age: 26
End: 2021-01-20

## 2021-01-20 DIAGNOSIS — M25.561 ACUTE PAIN OF RIGHT KNEE: Primary | ICD-10-CM

## 2021-01-20 DIAGNOSIS — Z98.890 S/P ARTHROSCOPY OF RIGHT KNEE: ICD-10-CM

## 2021-01-20 DIAGNOSIS — R26.2 DIFFICULTY WALKING: ICD-10-CM

## 2021-01-20 DIAGNOSIS — Z74.09 IMPAIRED FUNCTIONAL MOBILITY AND ACTIVITY TOLERANCE: ICD-10-CM

## 2021-01-20 DIAGNOSIS — S83.004D DISLOCATION OF RIGHT PATELLA, SUBSEQUENT ENCOUNTER: ICD-10-CM

## 2021-01-20 PROCEDURE — 97530 THERAPEUTIC ACTIVITIES: CPT | Performed by: PHYSICAL THERAPIST

## 2021-01-20 PROCEDURE — 97035 APP MDLTY 1+ULTRASOUND EA 15: CPT | Performed by: PHYSICAL THERAPIST

## 2021-01-20 PROCEDURE — 97110 THERAPEUTIC EXERCISES: CPT | Performed by: PHYSICAL THERAPIST

## 2021-01-20 NOTE — PROGRESS NOTES
Physical Therapy Daily Progress Note    Patient: Taty Chacko   : 1995  Diagnosis/ICD-10 Code:  Acute pain of right knee [M25.561]  Referring practitioner: Raissa Hodges MD  Date of Initial Visit: Type: THERAPY  Noted: 2020  Today's Date: 2021  Patient seen for 7 sessions           Subjective It was a little sore last night. Didn't do anything different with my activities.     Objective   See Exercise, Manual, and Modality Logs for complete treatment.     Exercises:  -NuStep level 5, 6 min  -6 inch step up fwd and lateral, x20  -6 inch step up and over, x1  -[place] single leg press, 100#,   -[place] hip abd 45#, x20  -[place] hip add, 60#, x20  -SLR and SL hip abd, 2x10, 1# defer  -prone hip ext 2x10, 1# defer  -hip add iso with ball, 2x10 for 5 sec hold  -bridge w/ball squeeze 2x10  -seated hamstring curls with red band, x20  -TKE with red band, x20  -long sitting hamstring and calf stretching, 3x20 sec    Assessment/Plan  Pt has been a little more sore the last couple of days. Her activities haven't changed, but she feels that she colder weather has been making the knee ache. She was able to go up and down a 6 inch step with good control, but with some pain descending         Timed:    Manual Therapy:         mins  29600;  Therapeutic Exercise:    20     mins  05713;     Neuromuscular Cynthia:        mins  71352;    Therapeutic Activity:     13     mins  12634;     Gait Training:           mins  82271;     Ultrasound:     8     mins  29862;    Electrical Stimulation:         mins  01855 ( );  Iontophoresis         mins 38601;  Aquatic Therapy         mins 41952;  Dry Needling                   mins    Untimed:  Electrical Stimulation:         mins  72509 ( );  Mechanical Traction:         mins  73975;     Timed Treatment:   41   mins   Total Treatment:     41   mins  Dia Austin, PT  Physical Therapist

## 2021-01-27 ENCOUNTER — TREATMENT (OUTPATIENT)
Dept: PHYSICAL THERAPY | Facility: CLINIC | Age: 26
End: 2021-01-27

## 2021-01-27 DIAGNOSIS — S83.004D DISLOCATION OF RIGHT PATELLA, SUBSEQUENT ENCOUNTER: ICD-10-CM

## 2021-01-27 DIAGNOSIS — M25.561 ACUTE PAIN OF RIGHT KNEE: Primary | ICD-10-CM

## 2021-01-27 DIAGNOSIS — R26.2 DIFFICULTY WALKING: ICD-10-CM

## 2021-01-27 DIAGNOSIS — Z98.890 S/P ARTHROSCOPY OF RIGHT KNEE: ICD-10-CM

## 2021-01-27 DIAGNOSIS — Z74.09 IMPAIRED FUNCTIONAL MOBILITY AND ACTIVITY TOLERANCE: ICD-10-CM

## 2021-01-27 PROCEDURE — 97110 THERAPEUTIC EXERCISES: CPT | Performed by: PHYSICAL THERAPIST

## 2021-01-27 PROCEDURE — 97530 THERAPEUTIC ACTIVITIES: CPT | Performed by: PHYSICAL THERAPIST

## 2021-01-27 NOTE — PROGRESS NOTES
Physical Therapy Daily Progress Note    Patient: Taty Chacko   : 1995  Diagnosis/ICD-10 Code:  Acute pain of right knee [M25.561]  Referring practitioner: Raissa Hodges MD  Date of Initial Visit: Type: THERAPY  Noted: 2020  Today's Date: 2021  Patient seen for 8 sessions           Subjective Doing better    Objective          Active Range of Motion     Right Knee   Flexion: 130 degrees   Extension: 5 (of HE) degrees       See Exercise, Manual, and Modality Logs for complete treatment.     Exercises:  -NuStep level 6, 5 min  -m/l on rockerboard x20  -mini squat with 5 sec hold, x10  -6 inch step up fwd and lateral, x20 defer  -6 inch step up and over,  Did reciprocally in stairwell  -[place] single leg press, 100#,   -[place] hip abd 45#, x20  -[place] hip add, 60#, x20  -SLR and SL hip abd, 2x10, 1#   -prone hip ext 2x10, 1#   -bridge w/ball squeeze 2x10  -seated hamstring curls with green band, x20  -TKE with green band, x20  -long sitting hamstring and calf stretching, 3x20 sec    Assessment/Plan  Pt is not having to take ibuprofen as often now. She is still achy, but improved over the past few weeks. She has improved her ROM to 5 deg of HE to 130 deg. She is able to perform stairs with a reciprocal pattern, some discomfort at times descending. She is still unable to kneel or walk larger dogs at work, they pull on the leash and it aggravates her knee. She also still has some swelling and tenderness over the medial incision.          Timed:    Manual Therapy:         mins  19567;  Therapeutic Exercise:    25     mins  12714;     Neuromuscular Cynthia:        mins  85370;    Therapeutic Activity:     15     mins  23465;     Gait Training:           mins  90374;     Ultrasound:          mins  72113;    Electrical Stimulation:         mins  28415 ( );  Iontophoresis         mins 33962;  Aquatic Therapy         mins 52333;  Dry Needling                   mins    Untimed:  Electrical  Stimulation:         mins  25177 ( );  Mechanical Traction:         mins  81567;     Timed Treatment:   40   mins   Total Treatment:     40   mins  Dia Austin, PT  Physical Therapist

## 2021-01-29 ENCOUNTER — TREATMENT (OUTPATIENT)
Dept: PHYSICAL THERAPY | Facility: CLINIC | Age: 26
End: 2021-01-29

## 2021-01-29 DIAGNOSIS — R26.2 DIFFICULTY WALKING: ICD-10-CM

## 2021-01-29 DIAGNOSIS — Z98.890 S/P ARTHROSCOPY OF RIGHT KNEE: ICD-10-CM

## 2021-01-29 DIAGNOSIS — Z74.09 IMPAIRED FUNCTIONAL MOBILITY AND ACTIVITY TOLERANCE: ICD-10-CM

## 2021-01-29 DIAGNOSIS — S83.004D DISLOCATION OF RIGHT PATELLA, SUBSEQUENT ENCOUNTER: ICD-10-CM

## 2021-01-29 DIAGNOSIS — M25.561 ACUTE PAIN OF RIGHT KNEE: Primary | ICD-10-CM

## 2021-01-29 PROCEDURE — 97530 THERAPEUTIC ACTIVITIES: CPT | Performed by: PHYSICAL THERAPIST

## 2021-01-29 PROCEDURE — 97110 THERAPEUTIC EXERCISES: CPT | Performed by: PHYSICAL THERAPIST

## 2021-01-29 PROCEDURE — 97035 APP MDLTY 1+ULTRASOUND EA 15: CPT | Performed by: PHYSICAL THERAPIST

## 2021-01-29 NOTE — PROGRESS NOTES
Physical Therapy Daily Progress Note    Patient: Taty Chacko   : 1995  Diagnosis/ICD-10 Code:  Acute pain of right knee [M25.561]  Referring practitioner: No ref. provider found  Date of Initial Visit: Type: THERAPY  Noted: 2020  Today's Date: 2021  Patient seen for 9 sessions           Subjective had to change MD appt, car doesn't drive well in the snow.     Objective   See Exercise, Manual, and Modality Logs for complete treatment.     Exercises:  -NuStep level 6, 5 min  -m/l on rockerboard x20  -mini squat with 5 sec hold, x10  -6 inch step up fwd and lateral, x20   -6 inch step up and over, x10  -[place] single leg press, 120#,   -[place] hip abd 45#, x20  -Janneth hip add, 60#, x20  -SLR and SL hip abd, 2x10, 1#  defer  -prone hip ext 2x10, 1# defer  -bridge w/ball squeeze 2x10  -seated hamstring curls with green band, x20 defer  -TKE with green band, x20 defer  -long sitting hamstring and calf stretching, 3x20 sec    Assessment/Plan  Pt has had some more medial soreness over the past couple of days. She usually is more sore by the end of the work week. 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 and has difficulty with controlling a larger dog without pain         Timed:    Manual Therapy:         mins  25981;  Therapeutic Exercise:    20     mins  09543;     Neuromuscular Cynthia:        mins  31190;    Therapeutic Activity:     12     mins  52988;     Gait Training:           mins  71480;     Ultrasound:     9     mins  58023;    Electrical Stimulation:         mins  97299 ( );  Iontophoresis         mins 22294;  Aquatic Therapy         mins 64656;  Dry Needling                   mins    Untimed:  Electrical Stimulation:         mins  63087 ( );  Mechanical Traction:         mins  31772;     Timed Treatment:   41   mins   Total Treatment:     41   mins  Dia Austin, PT  Physical Therapist

## 2021-02-03 ENCOUNTER — TREATMENT (OUTPATIENT)
Dept: PHYSICAL THERAPY | Facility: CLINIC | Age: 26
End: 2021-02-03

## 2021-02-03 DIAGNOSIS — S83.004D DISLOCATION OF RIGHT PATELLA, SUBSEQUENT ENCOUNTER: ICD-10-CM

## 2021-02-03 DIAGNOSIS — Z98.890 S/P ARTHROSCOPY OF RIGHT KNEE: ICD-10-CM

## 2021-02-03 DIAGNOSIS — M25.561 ACUTE PAIN OF RIGHT KNEE: Primary | ICD-10-CM

## 2021-02-03 DIAGNOSIS — R26.2 DIFFICULTY WALKING: ICD-10-CM

## 2021-02-03 DIAGNOSIS — Z74.09 IMPAIRED FUNCTIONAL MOBILITY AND ACTIVITY TOLERANCE: ICD-10-CM

## 2021-02-03 PROCEDURE — 97110 THERAPEUTIC EXERCISES: CPT | Performed by: PHYSICAL THERAPIST

## 2021-02-03 PROCEDURE — 97035 APP MDLTY 1+ULTRASOUND EA 15: CPT | Performed by: PHYSICAL THERAPIST

## 2021-02-03 PROCEDURE — 97530 THERAPEUTIC ACTIVITIES: CPT | Performed by: PHYSICAL THERAPIST

## 2021-02-03 NOTE — PROGRESS NOTES
Right Knee Scope follow Up       Patient: Taty Chacko        YOB: 1995      Chief Complaints: knee pain right      History of Present Illness: Pt is here f/u right knee arthroscopy she states she doing great she feels much better feels like she is getting stronger she is back to work still with restrictions and we will continue those        Allergies:   Allergies   Allergen Reactions   • Hydrocodone Itching and Rash       Medications:   Home Medications:  Current Outpatient Medications on File Prior to Visit   Medication Sig   • hydrOXYzine (ATARAX) 25 MG tablet Take 25-50 mg by mouth Every 6 (Six) Hours As Needed.   • ibuprofen (ADVIL,MOTRIN) 200 MG tablet Take 200 mg by mouth Every 6 (Six) Hours As Needed.   • levonorgestrel (KYLEENA) 19.5 MG intrauterine device IUD 1 each by Intrauterine route 1 (One) Time.   • minocycline (MINOCIN,DYNACIN) 50 MG capsule Take 50 mg by mouth 2 (Two) Times a Day.   • Multiple Vitamins-Minerals (MULTIVITAMIN ADULT PO) Take 1 tablet by mouth Daily.   • oxyCODONE-acetaminophen (PERCOCET) 5-325 MG per tablet Take 1 to 2 tablets by mouth every 4 hours as needed for severe pain.   • topiramate (TOPAMAX) 50 MG tablet Take 1 tablet by mouth 2 (Two) Times a Day.     No current facility-administered medications on file prior to visit.      Current Medications:  Scheduled Meds:  Continuous Infusions:No current facility-administered medications for this visit.     PRN Meds:.          Physical Exam: 25 y.o. female  General Appearance:    Alert, cooperative, in no acute distress                 There were no vitals filed for this visit.   Patient is alert and oriented ×3 no acute distress normal mood physical exam.  Physical exam of the knee, incisions looked good there is no erythema, calf is soft and non-tender.  No sign or sx of DVT    Quads are improving  Assessment  S/P knee scope.  Overall doing well.  She is doing well we will continue to work on her quad she will  continue her brace        Plan: Continue with strengthening, progression of activities she can work 10-hour days but lifting restrictions of no lifting more than 30 pounds I will see her back in 6 weeks

## 2021-02-03 NOTE — PROGRESS NOTES
Physical Therapy Daily Progress Note    Patient: Taty Chacko   : 1995  Diagnosis/ICD-10 Code:  Acute pain of right knee [M25.561]  Referring practitioner: Raissa Hodges MD  Date of Initial Visit: Type: THERAPY  Noted: 2020  Today's Date: 2/3/2021  Patient seen for 10 sessions           Subjective  Knee is achy today    Objective   See Exercise, Manual, and Modality Logs for complete treatment.     Exercises:  -NuStep level 6, 5 min  -m/l on rockerboard x20  -mini squat with 5 sec hold, x10  -6 inch step up fwd and lateral, x20   -6 inch step up and over, x10  -[place] single leg press, 120#,   -[place] hip abd 45#, x20  -[place] hip add, 60#, x20  -SLR and SL hip abd, 2x10, 1#  defer  -prone hip ext 2x10, 1# defer  -bridge w/ball squeeze 2x10  -seated hamstring curls with green band, x20 defer  -TKE with green band, x20 defer  -chair squats, x10  -long sitting hamstring and calf stretching, 3x20 sec    Assessment/Plan  Pt still with pain with a deeper squat or with kneeling. She is tolerating the stairs better at home, able to perform reciprocally but with discomfort descending at times. She has some tenderness over the medial knee and patella and at times over the lateral as well.          Timed:    Manual Therapy:         mins  73778;  Therapeutic Exercise:    20     mins  91382;     Neuromuscular Cynthia:        mins  09233;    Therapeutic Activity:     12     mins  69176;     Gait Training:           mins  84562;     Ultrasound:     9     mins  80827;    Electrical Stimulation:         mins  36510 ( );  Iontophoresis         mins 76452;  Aquatic Therapy         mins 85708;  Dry Needling                   mins    Untimed:  Electrical Stimulation:         mins  98882 ( );  Mechanical Traction:         mins  93618;     Timed Treatment:   41   mins   Total Treatment:     41   mins  Dia Austin, PT  Physical Therapist

## 2021-02-04 ENCOUNTER — OFFICE VISIT (OUTPATIENT)
Dept: ORTHOPEDIC SURGERY | Facility: CLINIC | Age: 26
End: 2021-02-04

## 2021-02-04 VITALS — HEIGHT: 66 IN | BODY MASS INDEX: 27.32 KG/M2 | TEMPERATURE: 97.2 F | WEIGHT: 170 LBS

## 2021-02-04 DIAGNOSIS — Z98.890 S/P ARTHROSCOPY OF KNEE: Primary | ICD-10-CM

## 2021-02-04 PROCEDURE — 99024 POSTOP FOLLOW-UP VISIT: CPT | Performed by: ORTHOPAEDIC SURGERY

## 2021-02-05 ENCOUNTER — TREATMENT (OUTPATIENT)
Dept: PHYSICAL THERAPY | Facility: CLINIC | Age: 26
End: 2021-02-05

## 2021-02-05 ENCOUNTER — TELEPHONE (OUTPATIENT)
Dept: ORTHOPEDICS | Facility: OTHER | Age: 26
End: 2021-02-05

## 2021-02-05 DIAGNOSIS — Z74.09 IMPAIRED FUNCTIONAL MOBILITY AND ACTIVITY TOLERANCE: ICD-10-CM

## 2021-02-05 DIAGNOSIS — R26.2 DIFFICULTY WALKING: ICD-10-CM

## 2021-02-05 DIAGNOSIS — Z98.890 S/P ARTHROSCOPY OF RIGHT KNEE: ICD-10-CM

## 2021-02-05 DIAGNOSIS — S83.004D DISLOCATION OF RIGHT PATELLA, SUBSEQUENT ENCOUNTER: ICD-10-CM

## 2021-02-05 DIAGNOSIS — M25.561 ACUTE PAIN OF RIGHT KNEE: Primary | ICD-10-CM

## 2021-02-05 PROCEDURE — 97530 THERAPEUTIC ACTIVITIES: CPT | Performed by: PHYSICAL THERAPIST

## 2021-02-05 PROCEDURE — 97110 THERAPEUTIC EXERCISES: CPT | Performed by: PHYSICAL THERAPIST

## 2021-02-05 PROCEDURE — 97035 APP MDLTY 1+ULTRASOUND EA 15: CPT | Performed by: PHYSICAL THERAPIST

## 2021-02-05 NOTE — TELEPHONE ENCOUNTER
Caller: [facility]    Relationship:  FOR ALYSSA Gutierrez call back number: [phone]    What form or medical record are you requesting: WORK STATUS    Who is requesting this form or medical record from you: FAX    How would you like to receive the form or medical records (pick-up, mail, fax): FAX  If fax, what is the fax number: [phone]      Timeframe paperwork needed: FAX    Additional notes: [facility] CALLED TO FOLLOW UP ON PATIENT WORK STATUS- WHEN COMPLETE PLEASE FAX FORM -[phone].    IF YOU HAVE QUESTIONS/CONCERNS PLEASE CALL [facility] @[phone] DIRECT LINE.    THANK YOU

## 2021-02-05 NOTE — PROGRESS NOTES
Physical Therapy Daily Progress Note    Patient: Taty Chacko   : 1995  Diagnosis/ICD-10 Code:  Acute pain of right knee [M25.561]  Referring practitioner: Raissa Hodges MD  Date of Initial Visit: Type: THERAPY  Noted: 2020  Today's Date: 2021  Patient seen for 11 sessions           Subjective  MD appt yesterday. Still with the weight restrictions on lifting but she is pleased with her progress    Objective   See Exercise, Manual, and Modality Logs for complete treatment.     Exercises:  -NuStep level 6, 5 min  -m/l on rockerboard x20  -mini squat with 5 sec hold, x10  -6 inch step up fwd and lateral, x20   -6 inch step up and over, x10  -[place] single leg press, 130#,   -[place] hip abd 45#, x20  -[place] hip add, 65#, x20  -SLR and SL hip abd, 2x10, 1#  defer  -prone hip ext 2x10, 1# defer  -bridge w/ball squeeze 2x10  -seated hamstring curls with green band, x20 defer  -TKE with green band, x20 defer  -chair squats, x10  -long sitting hamstring and calf stretching, 3x20 sec    Assessment/Plan  Pt still having issues with kneeling and squatting, but her other activities are more tolerable. Needs to take rest breaks at work after standing for too long. Overall strength and pain improved         Timed:    Manual Therapy:         mins  51972;  Therapeutic Exercise:    22     mins  96210;     Neuromuscular Cynthia:        mins  93468;    Therapeutic Activity:     10     mins  72556;     Gait Training:           mins  00719;     Ultrasound:     9     mins  36517;    Electrical Stimulation:         mins  28038 ( );  Iontophoresis         mins 80732;  Aquatic Therapy         mins 80891;  Dry Needling                   mins    Untimed:  Electrical Stimulation:         mins  54106 ( );  Mechanical Traction:         mins  03544;     Timed Treatment:   41   mins   Total Treatment:     41   mins  Dia Austin, PT  Physical Therapist

## 2021-02-09 ENCOUNTER — TELEPHONE (OUTPATIENT)
Dept: ORTHOPEDIC SURGERY | Facility: CLINIC | Age: 26
End: 2021-02-09

## 2021-02-09 DIAGNOSIS — Z98.890 S/P ARTHROSCOPY OF KNEE: Primary | ICD-10-CM

## 2021-02-09 NOTE — TELEPHONE ENCOUNTER
Dia from [facility] needs a new order to get visits approved from work comp.  Please put in a new PT order.

## 2021-02-10 ENCOUNTER — TREATMENT (OUTPATIENT)
Dept: PHYSICAL THERAPY | Facility: CLINIC | Age: 26
End: 2021-02-10

## 2021-02-10 DIAGNOSIS — S83.004D DISLOCATION OF RIGHT PATELLA, SUBSEQUENT ENCOUNTER: ICD-10-CM

## 2021-02-10 DIAGNOSIS — R26.2 DIFFICULTY WALKING: ICD-10-CM

## 2021-02-10 DIAGNOSIS — Z98.890 S/P ARTHROSCOPY OF RIGHT KNEE: ICD-10-CM

## 2021-02-10 DIAGNOSIS — Z74.09 IMPAIRED FUNCTIONAL MOBILITY AND ACTIVITY TOLERANCE: ICD-10-CM

## 2021-02-10 DIAGNOSIS — M25.561 ACUTE PAIN OF RIGHT KNEE: Primary | ICD-10-CM

## 2021-02-10 PROCEDURE — 97110 THERAPEUTIC EXERCISES: CPT | Performed by: PHYSICAL THERAPIST

## 2021-02-10 PROCEDURE — 97530 THERAPEUTIC ACTIVITIES: CPT | Performed by: PHYSICAL THERAPIST

## 2021-02-10 NOTE — PROGRESS NOTES
Physical Therapy Daily Progress Note    Patient: Taty Chacko   : 1995  Diagnosis/ICD-10 Code:  Acute pain of right knee [M25.561]  Referring practitioner: Raissa Hodges MD  Date of Initial Visit: Type: THERAPY  Noted: 2020  Today's Date: 2/10/2021  Patient seen for 12 sessions           Subjective Pt is having less pain overall    Objective   See Exercise, Manual, and Modality Logs for complete treatment.     Exercises:  -NuStep level 6, 5 min  -m/l on rockerboard x20  -mini squat with 5 sec hold, x10  -6 inch step up fwd and lateral, x20   -6 inch step up and over, x10  -[place] single leg press, 140#,   -[place] hip abd 50#, x20  -[place] hip add, 80#, x20  -bridge w/ball squeeze 2x10  -hamstring curls on Matrix, 25# x20  -TKE with green band, x20   -chair squats, x10  -long sitting hamstring and calf stretching, 3x20 sec    Assessment/Plan  Pt pain levels continue to improve with activity. Still difficulty with managing lifting and walking larger dogs (the twisting can cause issues), but limited to 30lb right now. Starting to get back into some light exercise at home (pilates, gentle yoga), but cannot kneel on the knee without being on a couple of pillows         Timed:    Manual Therapy:         mins  64944;  Therapeutic Exercise:    25     mins  93894;     Neuromuscular Cynthia:        mins  58895;    Therapeutic Activity:     15     mins  54819;     Gait Training:           mins  77570;     Ultrasound:          mins  53655;    Electrical Stimulation:         mins  40441 ( );  Iontophoresis         mins 68471;  Aquatic Therapy         mins 62660;  Dry Needling                   mins    Untimed:  Electrical Stimulation:         mins  30890 ( );  Mechanical Traction:         mins  52233;     Timed Treatment:   40   mins   Total Treatment:     40   mins  Dia Austin, PT  Physical Therapist

## 2021-02-12 ENCOUNTER — TREATMENT (OUTPATIENT)
Dept: PHYSICAL THERAPY | Facility: CLINIC | Age: 26
End: 2021-02-12

## 2021-02-12 DIAGNOSIS — S83.004D DISLOCATION OF RIGHT PATELLA, SUBSEQUENT ENCOUNTER: ICD-10-CM

## 2021-02-12 DIAGNOSIS — R26.2 DIFFICULTY WALKING: ICD-10-CM

## 2021-02-12 DIAGNOSIS — Z74.09 IMPAIRED FUNCTIONAL MOBILITY AND ACTIVITY TOLERANCE: ICD-10-CM

## 2021-02-12 DIAGNOSIS — Z98.890 S/P ARTHROSCOPY OF RIGHT KNEE: ICD-10-CM

## 2021-02-12 DIAGNOSIS — M25.561 ACUTE PAIN OF RIGHT KNEE: Primary | ICD-10-CM

## 2021-02-12 PROCEDURE — 97035 APP MDLTY 1+ULTRASOUND EA 15: CPT | Performed by: PHYSICAL THERAPIST

## 2021-02-12 PROCEDURE — 97110 THERAPEUTIC EXERCISES: CPT | Performed by: PHYSICAL THERAPIST

## 2021-02-12 PROCEDURE — 97530 THERAPEUTIC ACTIVITIES: CPT | Performed by: PHYSICAL THERAPIST

## 2021-02-12 NOTE — PROGRESS NOTES
Physical Therapy Daily Progress Note    Patient: Taty Chacko   : 1995  Diagnosis/ICD-10 Code:  Acute pain of right knee [M25.561]  Referring practitioner: Raissa Hodegs MD  Date of Initial Visit: Type: THERAPY  Noted: 2020  Today's Date: 2021  Patient seen for 13 sessions           Subjective I was on my feet a lot yesterday and it is sore    Objective   See Exercise, Manual, and Modality Logs for complete treatment.     Exercises:  -NuStep level 6, 5 min  -m/l on rockerboard x20  -mini squat with 5 sec hold, x10  -6 inch step up fwd and lateral, x20   -6 inch step up and over, x10  -[place] single leg press, 140#,   -[place] hip abd 50#, x20  -[place] hip add, 80#, x20  -bridge w/ball squeeze 2x10  -hamstring curls on Matrix, 25# x20  -TKE with green band, x20 defer  -chair squats, x10 defer  -long sitting hamstring and calf stretching, 3x20 sec    Assessment/Plan  Pt is a little more sore today after a long day at work yesterday. 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 She still has issues trying to kneel on the knee or deep squat, but is doing well now on stairs with minimal discomfort descending         Timed:    Manual Therapy:         mins  34529;  Therapeutic Exercise:    22     mins  99929;     Neuromuscular Cynthia:        mins  65471;    Therapeutic Activity:     10     mins  97132;     Gait Training:           mins  48919;     Ultrasound:     9     mins  79606;    Electrical Stimulation:         mins  52002 ( );  Iontophoresis         mins 84165;  Aquatic Therapy         mins 17428;  Dry Needling                   mins    Untimed:  Electrical Stimulation:         mins  31598 ( );  Mechanical Traction:         mins  12069;     Timed Treatment:   41   mins   Total Treatment:     41   mins  Dia Austin, PT  Physical Therapist

## 2021-03-29 ENCOUNTER — DOCUMENTATION (OUTPATIENT)
Dept: PHYSICAL THERAPY | Facility: CLINIC | Age: 26
End: 2021-03-29

## 2021-03-29 DIAGNOSIS — M25.561 ACUTE PAIN OF RIGHT KNEE: Primary | ICD-10-CM

## 2021-03-29 DIAGNOSIS — Z98.890 S/P ARTHROSCOPY OF RIGHT KNEE: ICD-10-CM

## 2021-03-29 DIAGNOSIS — Z74.09 IMPAIRED FUNCTIONAL MOBILITY AND ACTIVITY TOLERANCE: ICD-10-CM

## 2021-03-29 DIAGNOSIS — R26.2 DIFFICULTY WALKING: ICD-10-CM

## 2021-03-29 DIAGNOSIS — S83.004D DISLOCATION OF RIGHT PATELLA, SUBSEQUENT ENCOUNTER: ICD-10-CM

## 2021-03-29 NOTE — PROGRESS NOTES
Closure of Physical Therapy Encounter     Taty Chacko was seen for 13 physical therapy sessions for s/p R knee scope.  Treatment included therapeutic exercise, manual therapy, therapeutic activity, neuro-muscular retraining , gait training, ultrasound and patient education with home exercise program . Progress to physical therapy goals was good. She was still having issues with kneeling, twisting, or squatting, but she had returned to work and was tolerating most activities well. 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  She was discharged to an independent [facility] and provided patient education to self-manage condition.             Dia Austin, PT  Physical Therapist

## 2021-04-08 NOTE — PROGRESS NOTES
Right Knee Scope follow Up       Patient: Taty Chacko        YOB: 1995      Chief Complaints: right  knee pain      History of Present Illness: Pt is here f/u knee arthroscopy she states she is doing great her knee feels pretty normal she is able to do just about everything she wants to do except for some time stooping squatting or lifting heavy objects        Allergies:   Allergies   Allergen Reactions   • Hydrocodone Itching and Rash       Medications:   Home Medications:  Current Outpatient Medications on File Prior to Visit   Medication Sig   • hydrOXYzine (ATARAX) 25 MG tablet Take 25-50 mg by mouth Every 6 (Six) Hours As Needed.   • ibuprofen (ADVIL,MOTRIN) 200 MG tablet Take 200 mg by mouth Every 6 (Six) Hours As Needed.   • levonorgestrel (KYLEENA) 19.5 MG intrauterine device IUD 1 each by Intrauterine route 1 (One) Time.   • minocycline (MINOCIN,DYNACIN) 50 MG capsule Take 50 mg by mouth 2 (Two) Times a Day.   • Multiple Vitamins-Minerals (MULTIVITAMIN ADULT PO) Take 1 tablet by mouth Daily.   • oxyCODONE-acetaminophen (PERCOCET) 5-325 MG per tablet Take 1 to 2 tablets by mouth every 4 hours as needed for severe pain.   • topiramate (TOPAMAX) 50 MG tablet Take 1 tablet by mouth 2 (Two) Times a Day.     No current facility-administered medications on file prior to visit.     Current Medications:  Scheduled Meds:  Continuous Infusions:No current facility-administered medications for this visit.    PRN Meds:.          Physical Exam: 26 y.o. female  General Appearance:    Alert, cooperative, in no acute distress                 There were no vitals filed for this visit.   Patient is alert and oriented ×3 no acute distress normal mood physical exam.  Physical exam of the knee, incisions looked good there is no erythema, calf is soft and non-tender.  No sign or sx of DVT  Physical exam of the right knee reveals no effusion, no erythema.  The patient has no palpable tenderness along the medial  joint line, no tenderness about the lateral joint line.  Patient does have crepitus with patellofemoral range of motion.  They also have subjective symptoms anteriorly during exam.  The patient has a negative bounce home, negative Edward and a stable ligamentous exam.  Quad tone is reasonable and symmetric.  There are no overlying skin changes no lymphedema no lymphadenopathy.  There is good hip range of motion which is full symmetric and asymptomatic and a normal ankle exam.  Hamstrings and IT band are tight bilaterally.      Assessment  S/P knee scope.  Overall doing well.         Plan: Continue with strengthening, progression of activities she can continue to work I will put her at [facility] if work comp with like a percent impairment I am happy to do that

## 2021-04-09 ENCOUNTER — OFFICE VISIT (OUTPATIENT)
Dept: ORTHOPEDIC SURGERY | Facility: CLINIC | Age: 26
End: 2021-04-09

## 2021-04-09 VITALS — BODY MASS INDEX: 27.32 KG/M2 | TEMPERATURE: 97.1 F | WEIGHT: 170 LBS | HEIGHT: 66 IN

## 2021-04-09 DIAGNOSIS — Z98.890 S/P ARTHROSCOPY OF KNEE: Primary | ICD-10-CM

## 2021-04-09 PROCEDURE — 99212 OFFICE O/P EST SF 10 MIN: CPT | Performed by: ORTHOPAEDIC SURGERY

## 2021-04-16 ENCOUNTER — BULK ORDERING (OUTPATIENT)
Dept: CASE MANAGEMENT | Facility: OTHER | Age: 26
End: 2021-04-16

## 2021-04-16 DIAGNOSIS — Z23 IMMUNIZATION DUE: ICD-10-CM

## 2021-11-11 ENCOUNTER — HOSPITAL ENCOUNTER (EMERGENCY)
Facility: HOSPITAL | Age: 26
Discharge: HOME | End: 2021-11-11
Attending: EMERGENCY MEDICINE | Admitting: EMERGENCY MEDICINE

## 2021-11-11 ENCOUNTER — APPOINTMENT (OUTPATIENT)
Dept: GENERAL RADIOLOGY | Facility: HOSPITAL | Age: 26
End: 2021-11-11

## 2021-11-11 VITALS
RESPIRATION RATE: 16 BRPM | SYSTOLIC BLOOD PRESSURE: 112 MMHG | OXYGEN SATURATION: 97 % | HEART RATE: 80 BPM | DIASTOLIC BLOOD PRESSURE: 67 MMHG | TEMPERATURE: 98 F

## 2021-11-11 DIAGNOSIS — K21.9 GASTROESOPHAGEAL REFLUX DISEASE, UNSPECIFIED WHETHER ESOPHAGITIS PRESENT: Primary | ICD-10-CM

## 2021-11-11 DIAGNOSIS — R07.89 ATYPICAL CHEST PAIN: ICD-10-CM

## 2021-11-11 LAB
ALBUMIN SERPL-MCNC: 4.5 G/DL (ref 3.5–5.2)
ALBUMIN/GLOB SERPL: 1.7 G/DL
ALP SERPL-CCNC: 75 U/L (ref 39–117)
ALT SERPL W P-5'-P-CCNC: 9 U/L (ref 1–33)
ANION GAP SERPL CALCULATED.3IONS-SCNC: 10.2 MMOL/L (ref 5–15)
AST SERPL-CCNC: 13 U/L (ref 1–32)
BASOPHILS # BLD AUTO: 0.05 10*3/MM3 (ref 0–0.2)
BASOPHILS NFR BLD AUTO: 0.5 % (ref 0–1.5)
BILIRUB SERPL-MCNC: 0.3 MG/DL (ref 0–1.2)
BUN SERPL-MCNC: 10 MG/DL (ref 6–20)
BUN/CREAT SERPL: 16.4 (ref 7–25)
CALCIUM SPEC-SCNC: 9.4 MG/DL (ref 8.6–10.5)
CHLORIDE SERPL-SCNC: 104 MMOL/L (ref 98–107)
CO2 SERPL-SCNC: 25.8 MMOL/L (ref 22–29)
CREAT SERPL-MCNC: 0.61 MG/DL (ref 0.57–1)
D DIMER PPP FEU-MCNC: 0.46 MCGFEU/ML (ref 0–0.49)
DEPRECATED RDW RBC AUTO: 42.4 FL (ref 37–54)
EOSINOPHIL # BLD AUTO: 0.16 10*3/MM3 (ref 0–0.4)
EOSINOPHIL NFR BLD AUTO: 1.4 % (ref 0.3–6.2)
ERYTHROCYTE [DISTWIDTH] IN BLOOD BY AUTOMATED COUNT: 14.1 % (ref 12.3–15.4)
GFR SERPL CREATININE-BSD FRML MDRD: 118 ML/MIN/1.73
GLOBULIN UR ELPH-MCNC: 2.6 GM/DL
GLUCOSE SERPL-MCNC: 91 MG/DL (ref 65–99)
HCG SERPL QL: NEGATIVE
HCT VFR BLD AUTO: 38.6 % (ref 34–46.6)
HGB BLD-MCNC: 12.9 G/DL (ref 12–15.9)
IMM GRANULOCYTES # BLD AUTO: 0.04 10*3/MM3 (ref 0–0.05)
IMM GRANULOCYTES NFR BLD AUTO: 0.4 % (ref 0–0.5)
LIPASE SERPL-CCNC: 23 U/L (ref 13–60)
LYMPHOCYTES # BLD AUTO: 1.59 10*3/MM3 (ref 0.7–3.1)
LYMPHOCYTES NFR BLD AUTO: 14.3 % (ref 19.6–45.3)
MCH RBC QN AUTO: 27.7 PG (ref 26.6–33)
MCHC RBC AUTO-ENTMCNC: 33.4 G/DL (ref 31.5–35.7)
MCV RBC AUTO: 82.8 FL (ref 79–97)
MONOCYTES # BLD AUTO: 1.15 10*3/MM3 (ref 0.1–0.9)
MONOCYTES NFR BLD AUTO: 10.4 % (ref 5–12)
NEUTROPHILS NFR BLD AUTO: 73 % (ref 42.7–76)
NEUTROPHILS NFR BLD AUTO: 8.12 10*3/MM3 (ref 1.7–7)
NRBC BLD AUTO-RTO: 0 /100 WBC (ref 0–0.2)
PLATELET # BLD AUTO: 311 10*3/MM3 (ref 140–450)
PMV BLD AUTO: 10.5 FL (ref 6–12)
POTASSIUM SERPL-SCNC: 4 MMOL/L (ref 3.5–5.2)
PROT SERPL-MCNC: 7.1 G/DL (ref 6–8.5)
QT INTERVAL: 351 MS
RBC # BLD AUTO: 4.66 10*6/MM3 (ref 3.77–5.28)
SODIUM SERPL-SCNC: 140 MMOL/L (ref 136–145)
TROPONIN T SERPL-MCNC: <0.01 NG/ML (ref 0–0.03)
WBC # BLD AUTO: 11.11 10*3/MM3 (ref 3.4–10.8)

## 2021-11-11 PROCEDURE — 83690 ASSAY OF LIPASE: CPT | Performed by: PHYSICIAN ASSISTANT

## 2021-11-11 PROCEDURE — 85379 FIBRIN DEGRADATION QUANT: CPT | Performed by: PHYSICIAN ASSISTANT

## 2021-11-11 PROCEDURE — 93010 ELECTROCARDIOGRAM REPORT: CPT | Performed by: INTERNAL MEDICINE

## 2021-11-11 PROCEDURE — 80053 COMPREHEN METABOLIC PANEL: CPT | Performed by: PHYSICIAN ASSISTANT

## 2021-11-11 PROCEDURE — 96374 THER/PROPH/DIAG INJ IV PUSH: CPT

## 2021-11-11 PROCEDURE — 84703 CHORIONIC GONADOTROPIN ASSAY: CPT | Performed by: PHYSICIAN ASSISTANT

## 2021-11-11 PROCEDURE — 71045 X-RAY EXAM CHEST 1 VIEW: CPT

## 2021-11-11 PROCEDURE — 85025 COMPLETE CBC W/AUTO DIFF WBC: CPT | Performed by: PHYSICIAN ASSISTANT

## 2021-11-11 PROCEDURE — 93005 ELECTROCARDIOGRAM TRACING: CPT

## 2021-11-11 PROCEDURE — 84484 ASSAY OF TROPONIN QUANT: CPT | Performed by: PHYSICIAN ASSISTANT

## 2021-11-11 PROCEDURE — 99283 EMERGENCY DEPT VISIT LOW MDM: CPT

## 2021-11-11 RX ORDER — LIDOCAINE HYDROCHLORIDE 20 MG/ML
15 SOLUTION OROPHARYNGEAL ONCE
Status: COMPLETED | OUTPATIENT
Start: 2021-11-11 | End: 2021-11-11

## 2021-11-11 RX ORDER — ALUMINA, MAGNESIA, AND SIMETHICONE 2400; 2400; 240 MG/30ML; MG/30ML; MG/30ML
15 SUSPENSION ORAL ONCE
Status: COMPLETED | OUTPATIENT
Start: 2021-11-11 | End: 2021-11-11

## 2021-11-11 RX ORDER — SUCRALFATE 1 G/1
1 TABLET ORAL 4 TIMES DAILY
Qty: 60 TABLET | Refills: 0 | Status: SHIPPED | OUTPATIENT
Start: 2021-11-11 | End: 2021-11-26

## 2021-11-11 RX ORDER — PANTOPRAZOLE SODIUM 40 MG/1
40 TABLET, DELAYED RELEASE ORAL DAILY
Qty: 15 TABLET | Refills: 0 | Status: SHIPPED | OUTPATIENT
Start: 2021-11-11 | End: 2022-06-24 | Stop reason: SDUPTHER

## 2021-11-11 RX ORDER — FAMOTIDINE 10 MG/ML
20 INJECTION, SOLUTION INTRAVENOUS ONCE
Status: COMPLETED | OUTPATIENT
Start: 2021-11-11 | End: 2021-11-11

## 2021-11-11 RX ADMIN — MAGNESIUM HYDROXIDE,ALUMINUM HYDROXICE,SIMETHICONE 15 ML: 240; 2400; 2400 SUSPENSION ORAL at 13:28

## 2021-11-11 RX ADMIN — FAMOTIDINE 20 MG: 10 INJECTION INTRAVENOUS at 13:28

## 2021-11-11 RX ADMIN — LIDOCAINE HYDROCHLORIDE 15 ML: 20 SOLUTION ORAL; TOPICAL at 13:28

## 2021-11-11 NOTE — ED PROVIDER NOTES
MD ATTESTATION NOTE    The TR and I have discussed this patient's history, physical exam, and treatment plan.  I have reviewed the documentation and personally had a face to face interaction with the patient. I affirm the documentation and agree with the treatment and plan.  The attached note describes my personal findings.      Taty Chacko is a 26 y.o. female who presents to the ED c/o epigastric and chest pain.  She reports it is midsternal.  It does not radiate.  She states that the pain has been going on approximately for the last week.  She states she has been taking Tums without significant improvement.  She reports some of the pain is going into her bilateral lower chest.  She has no trouble breathing.  It is not worse with deep breath.  It is not worse with exertion.  She denies prior similar episodes.  She has a PE risk factor of recent plane travel.  She denies calf tenderness or swelling.      On exam:  GENERAL: Awake, alert, no acute distress  SKIN: Warm, dry  HENT: Normocephalic, atraumatic  EYES: no scleral icterus  CV: regular rhythm, regular rate  RESPIRATORY: normal effort, lungs clear  ABDOMEN: soft, nontender, nondistended  MUSCULOSKELETAL: no deformity no calf tenderness or swelling  NEURO: alert, moves all extremities, follows commands    Labs  Recent Results (from the past 24 hour(s))   ECG 12 Lead    Collection Time: 11/11/21 11:46 AM   Result Value Ref Range    QT Interval 351 ms   Comprehensive Metabolic Panel    Collection Time: 11/11/21  1:20 PM    Specimen: Blood   Result Value Ref Range    Glucose 91 65 - 99 mg/dL    BUN 10 6 - 20 mg/dL    Creatinine 0.61 0.57 - 1.00 mg/dL    Sodium 140 136 - 145 mmol/L    Potassium 4.0 3.5 - 5.2 mmol/L    Chloride 104 98 - 107 mmol/L    CO2 25.8 22.0 - 29.0 mmol/L    Calcium 9.4 8.6 - 10.5 mg/dL    Total Protein 7.1 6.0 - 8.5 g/dL    Albumin 4.50 3.50 - 5.20 g/dL    ALT (SGPT) 9 1 - 33 U/L    AST (SGOT) 13 1 - 32 U/L    Alkaline Phosphatase 75 39 -  117 U/L    Total Bilirubin 0.3 0.0 - 1.2 mg/dL    eGFR Non African Amer 118 >60 mL/min/1.73    Globulin 2.6 gm/dL    A/G Ratio 1.7 g/dL    BUN/Creatinine Ratio 16.4 7.0 - 25.0    Anion Gap 10.2 5.0 - 15.0 mmol/L   Troponin    Collection Time: 11/11/21  1:20 PM    Specimen: Blood   Result Value Ref Range    Troponin T <0.010 0.000 - 0.030 ng/mL   hCG, Serum, Qualitative    Collection Time: 11/11/21  1:20 PM    Specimen: Blood   Result Value Ref Range    HCG Qualitative Negative Negative   CBC Auto Differential    Collection Time: 11/11/21  1:20 PM    Specimen: Blood   Result Value Ref Range    WBC 11.11 (H) 3.40 - 10.80 10*3/mm3    RBC 4.66 3.77 - 5.28 10*6/mm3    Hemoglobin 12.9 12.0 - 15.9 g/dL    Hematocrit 38.6 34.0 - 46.6 %    MCV 82.8 79.0 - 97.0 fL    MCH 27.7 26.6 - 33.0 pg    MCHC 33.4 31.5 - 35.7 g/dL    RDW 14.1 12.3 - 15.4 %    RDW-SD 42.4 37.0 - 54.0 fl    MPV 10.5 6.0 - 12.0 fL    Platelets 311 140 - 450 10*3/mm3    Neutrophil % 73.0 42.7 - 76.0 %    Lymphocyte % 14.3 (L) 19.6 - 45.3 %    Monocyte % 10.4 5.0 - 12.0 %    Eosinophil % 1.4 0.3 - 6.2 %    Basophil % 0.5 0.0 - 1.5 %    Immature Grans % 0.4 0.0 - 0.5 %    Neutrophils, Absolute 8.12 (H) 1.70 - 7.00 10*3/mm3    Lymphocytes, Absolute 1.59 0.70 - 3.10 10*3/mm3    Monocytes, Absolute 1.15 (H) 0.10 - 0.90 10*3/mm3    Eosinophils, Absolute 0.16 0.00 - 0.40 10*3/mm3    Basophils, Absolute 0.05 0.00 - 0.20 10*3/mm3    Immature Grans, Absolute 0.04 0.00 - 0.05 10*3/mm3    nRBC 0.0 0.0 - 0.2 /100 WBC   D-dimer, Quantitative    Collection Time: 11/11/21  1:20 PM    Specimen: Blood   Result Value Ref Range    D-Dimer, Quantitative 0.46 0.00 - 0.49 MCGFEU/mL   Lipase    Collection Time: 11/11/21  1:20 PM    Specimen: Blood   Result Value Ref Range    Lipase 23 13 - 60 U/L       Radiology  XR Chest 1 View    Result Date: 11/11/2021  ONE VIEW PORTABLE CHEST  HISTORY: Chest pain.  The lungs are well-expanded and clear and the heart and hilar structures are  normal. There is no acute disease.  This report was finalized on 11/11/2021 1:45 PM by Dr. Jose Roberto Jain M.D.         Medical Decision Making:  ED Course as of 11/11/21 1819   Thu Nov 11, 2021   1300 My interpretation of the EKG performed at 11:46 AM is sinus rhythm rate 83 normal axis normal intervals no ST elevation or acute ST changes. No prior for comparison. [KA]   1340 My interpretation of the chest x-ray is no acute infiltrate or cardiomegaly. [KA]   1406 D-Dimer, Quant: 0.46 [KA]   1406 HCG Qualitative: Negative [KA]   1419 Troponin T: <0.010 [KA]   1504 Lipase: 23  I reassessed the patient, she is resting comfortably. She did get relief with GI cocktail. Low risk for PE, D-dimer negative, no dyspnea, oxygen 97 200% on room air. Symptoms most consistent with GERD/gastritis esophagitis. I will prescribe Protonix and Carafate, give her information for GI as well as the patient connection phone number to establish PCP. She is agreeable with the plan is stable for discharge. [KA]      ED Course User Index  [KA] Elyse Kendall PA       Plan to evaluate for pancreatitis, gallstones, PE, cardiac disease, pulmonary disease.  Her symptoms sound mostly like GERD.  Plan to give her a GI cocktail and evaluate for improvement.    PPE: The patient wore a mask and I wore a N95 mask throughout the entire patient encounter.      The patient has a COVID HM Topic on their chart, and they are fully vaccinated.    Diagnosis  Final diagnoses:   Gastroesophageal reflux disease, unspecified whether esophagitis present   Atypical chest pain        Mike Gross MD  11/11/21 1819     no

## 2021-11-11 NOTE — ED PROVIDER NOTES
EMERGENCY DEPARTMENT ENCOUNTER    Room Number:  05/05  Date seen:  11/11/2021  Time seen: 14:52 EST  PCP: Ja Forbes MD  Historian: patient      HPI:  Chief Complaint: chest pain    A complete HPI/ROS/PMH/PSH/SH/FH are unobtainable due to: none    Context: Taty Chacko is a 26 y.o. female who presents to the ED for evaluation of history of chest tightness that is constant moderate and is made worse by eating, swallowing, and deep breaths. It is not exertional. She denies any shortness of breath. She states it feels like indigestion but has been persistent despite the use of OTC antacids and Prilosec. She was urgent care yesterday and was evaluated, prescribed additional Prilosec. She does not have a PCP. The patient denies any immobilization, or surgeries/trauma in the last 4 weeks, as well as any exogenous estrogen use, hemoptysis, hx of VTE, calf pain, or leg swelling. She did take a flight last week that was less than 3 hours. She denies any cocaine use. She does vape CBD oil.        PAST MEDICAL HISTORY  Active Ambulatory Problems     Diagnosis Date Noted   • Headache, migraine 10/18/2016   • Migraine without aura with status migrainosus 10/22/2018   • Chronic pain of right knee 12/10/2020     Resolved Ambulatory Problems     Diagnosis Date Noted   • No Resolved Ambulatory Problems     Past Medical History:   Diagnosis Date   • Anxiety    • Arthritis    • Kidney infection    • Kidney stone    • MCL sprain of left knee    • Migraine          PAST SURGICAL HISTORY  Past Surgical History:   Procedure Laterality Date   • KNEE ARTHROSCOPY Right 12/16/2020    Procedure: RIGHT KNEE ARTHROSCOPY, DEBRIDEMENT OF PATELLA;  Surgeon: Raissa Hodges MD;  Location: [facility] OR [facility] [place];  Service: Orthopedics;  Laterality: Right;   • NO PAST SURGERIES           FAMILY HISTORY  Family History   Problem Relation Age of Onset   • Migraines Paternal Aunt    • Stroke Paternal Grandfather    • Malig Hyperthermia Neg Hx           SOCIAL HISTORY  Social History     Socioeconomic History   • Marital status: Single   Tobacco Use   • Smoking status: Never Smoker   • Smokeless tobacco: Never Used   Substance and Sexual Activity   • Alcohol use: Yes     Alcohol/week: 4.0 standard drinks     Types: 4 Glasses of wine per week     Comment: social   • Drug use: No   • Sexual activity: Defer         ALLERGIES  Hydrocodone        REVIEW OF SYSTEMS  Review of Systems     All systems reviewed and negative except for those discussed in HPI.       PHYSICAL EXAM  ED Triage Vitals   Temp Heart Rate Resp BP SpO2   11/11/21 1120 11/11/21 1120 11/11/21 1120 11/11/21 1155 11/11/21 1120   98 °F (36.7 °C) 116 18 127/76 100 %      Temp src Heart Rate Source Patient Position BP Location FiO2 (%)   -- -- -- -- --                GENERAL: Appearing, not distressed  HENT: atraumatic  EYES: no scleral icterus  CV: regular rhythm, regular rate of 82, no lower extremity edema, no calf tenderness, negative Homans' sign bilaterally  RESPIRATORY: normal effort, CTA B, oxygen is 100% on room air  ABDOMEN: soft, nontender nondistended  MUSCULOSKELETAL: no deformity  NEURO: alert, moves all extremities, follows commands  SKIN: warm, dry    Vital signs and nursing notes reviewed.          LAB RESULTS  Recent Results (from the past 24 hour(s))   ECG 12 Lead    Collection Time: 11/11/21 11:46 AM   Result Value Ref Range    QT Interval 351 ms   Comprehensive Metabolic Panel    Collection Time: 11/11/21  1:20 PM    Specimen: Blood   Result Value Ref Range    Glucose 91 65 - 99 mg/dL    BUN 10 6 - 20 mg/dL    Creatinine 0.61 0.57 - 1.00 mg/dL    Sodium 140 136 - 145 mmol/L    Potassium 4.0 3.5 - 5.2 mmol/L    Chloride 104 98 - 107 mmol/L    CO2 25.8 22.0 - 29.0 mmol/L    Calcium 9.4 8.6 - 10.5 mg/dL    Total Protein 7.1 6.0 - 8.5 g/dL    Albumin 4.50 3.50 - 5.20 g/dL    ALT (SGPT) 9 1 - 33 U/L    AST (SGOT) 13 1 - 32 U/L    Alkaline Phosphatase 75 39 - 117 U/L    Total  Bilirubin 0.3 0.0 - 1.2 mg/dL    eGFR Non African Amer 118 >60 mL/min/1.73    Globulin 2.6 gm/dL    A/G Ratio 1.7 g/dL    BUN/Creatinine Ratio 16.4 7.0 - 25.0    Anion Gap 10.2 5.0 - 15.0 mmol/L   Troponin    Collection Time: 11/11/21  1:20 PM    Specimen: Blood   Result Value Ref Range    Troponin T <0.010 0.000 - 0.030 ng/mL   hCG, Serum, Qualitative    Collection Time: 11/11/21  1:20 PM    Specimen: Blood   Result Value Ref Range    HCG Qualitative Negative Negative   CBC Auto Differential    Collection Time: 11/11/21  1:20 PM    Specimen: Blood   Result Value Ref Range    WBC 11.11 (H) 3.40 - 10.80 10*3/mm3    RBC 4.66 3.77 - 5.28 10*6/mm3    Hemoglobin 12.9 12.0 - 15.9 g/dL    Hematocrit 38.6 34.0 - 46.6 %    MCV 82.8 79.0 - 97.0 fL    MCH 27.7 26.6 - 33.0 pg    MCHC 33.4 31.5 - 35.7 g/dL    RDW 14.1 12.3 - 15.4 %    RDW-SD 42.4 37.0 - 54.0 fl    MPV 10.5 6.0 - 12.0 fL    Platelets 311 140 - 450 10*3/mm3    Neutrophil % 73.0 42.7 - 76.0 %    Lymphocyte % 14.3 (L) 19.6 - 45.3 %    Monocyte % 10.4 5.0 - 12.0 %    Eosinophil % 1.4 0.3 - 6.2 %    Basophil % 0.5 0.0 - 1.5 %    Immature Grans % 0.4 0.0 - 0.5 %    Neutrophils, Absolute 8.12 (H) 1.70 - 7.00 10*3/mm3    Lymphocytes, Absolute 1.59 0.70 - 3.10 10*3/mm3    Monocytes, Absolute 1.15 (H) 0.10 - 0.90 10*3/mm3    Eosinophils, Absolute 0.16 0.00 - 0.40 10*3/mm3    Basophils, Absolute 0.05 0.00 - 0.20 10*3/mm3    Immature Grans, Absolute 0.04 0.00 - 0.05 10*3/mm3    nRBC 0.0 0.0 - 0.2 /100 WBC   D-dimer, Quantitative    Collection Time: 11/11/21  1:20 PM    Specimen: Blood   Result Value Ref Range    D-Dimer, Quantitative 0.46 0.00 - 0.49 MCGFEU/mL   Lipase    Collection Time: 11/11/21  1:20 PM    Specimen: Blood   Result Value Ref Range    Lipase 23 13 - 60 U/L       Ordered the above labs and independently reviewed the results.        RADIOLOGY  XR Chest 1 View    Result Date: 11/11/2021  Narrative: ONE VIEW PORTABLE CHEST  HISTORY: Chest pain.  The lungs are  well-expanded and clear and the heart and hilar structures are normal. There is no acute disease.  This report was finalized on 2021 1:45 PM by Dr. Jose Roberto Jain M.D.        I ordered the above noted radiological studies. Reviewed by me and discussed with radiologist.  See dictation for official radiology interpretation.    PROCEDURES  Procedures        MEDICATIONS GIVEN IN ER  Medications   aluminum-magnesium hydroxide-simethicone (MAALOX MAX) 400-400-40 MG/5ML suspension 15 mL (15 mL Oral Given 21)   Lidocaine Viscous HCl (XYLOCAINE) 2 % solution 15 mL (15 mL Mouth/Throat Given 21)   famotidine (PEPCID) injection 20 mg (20 mg Intravenous Given 21)             PROGRESS AND CONSULTS    DDx includes but is not limited to acute coronary syndrome, pulmonary embolism, thoracic aortic dissection, pneumonia, pneumothorax, musculoskeletal pain, GERD or esophageal spasm, anxiety, myocarditis/pericarditis, esophageal rupture, pancreatitis.     History: 0  EC  Age: 0  Risk factors: 0    HEART score: 0        ED Course as of 21 1522   Thu 2021   1300 My interpretation of the EKG performed at 11:46 AM is sinus rhythm rate 83 normal axis normal intervals no ST elevation or acute ST changes. No prior for comparison. [KA]   1340 My interpretation of the chest x-ray is no acute infiltrate or cardiomegaly. [KA]   1406 D-Dimer, Quant: 0.46 [KA]   1406 HCG Qualitative: Negative [KA]   1419 Troponin T: <0.010 [KA]   1504 Lipase: 23  I reassessed the patient, she is resting comfortably. She did get relief with GI cocktail. Low risk for PE, D-dimer negative, no dyspnea, oxygen 97 200% on room air. Symptoms most consistent with GERD/gastritis esophagitis. I will prescribe Protonix and Carafate, give her information for GI as well as the patient connection phone number to establish PCP. She is agreeable with the plan is stable for discharge. [KA]      ED Course User Index  [KA]  Elyse Kendall PA             Patient was placed in face mask in first look. Patient was wearing facemask each time I entered the room and throughout our encounter. I wore protective equipment throughout this patient encounter including a face mask, eye shield and gloves. Hand hygiene was performed before donning protective equipment and after removal when leaving the room.        DIAGNOSIS  Final diagnoses:   Gastroesophageal reflux disease, unspecified whether esophagitis present   Atypical chest pain         Follow Up:  PATIENT CONNECTION - [place] [address]  [phone]  Call today      Dina Segovia MD  [address] [address]  [phone]            RX:     Medication List      New Prescriptions    pantoprazole 40 MG EC tablet  Commonly known as: PROTONIX  Take 1 tablet by mouth Daily.     sucralfate 1 g tablet  Commonly known as: CARAFATE  Take 1 tablet by mouth 4 (Four) Times a Day for 15 days.        Stop    omeprazole 40 MG capsule  Commonly known as: priLOSEC     oxyCODONE-acetaminophen 5-325 MG per tablet  Commonly known as: PERCOCET           Where to Get Your Medications      These medications were sent to [facility]/pharmacy #[id number] - [place], [place] - [address] - [phone] PH - [phone]   [address] [facility] [address]    Phone: [phone]   · pantoprazole 40 MG EC tablet  · sucralfate 1 g tablet           Latest Documented Vital Signs:  As of 15:22 EST  BP- 112/67 HR- 80 Temp- 98 °F (36.7 °C) O2 sat- 97%       Elyse Kendall PA  11/11/21 0789

## 2021-11-11 NOTE — ED TRIAGE NOTES
Pt to ER via PV. Pt c/o tightness in chest and pain in her back. Pt states pain when taking a deep breath. Pt recently traveled from [place].     Patient in mask. This RN in appropriate PPE throughout the patient's entire encounter.

## 2021-11-11 NOTE — DISCHARGE INSTRUCTIONS
Avoid nicotine, anti-inflammatories, alcohol and caffeine  Take the medications as prescribed  Recheck with the PCP of your choice in 2 to 3 days  Follow-up with the gastroenterologist listed, or one of your choice, if not improving  Return to the ER as needed

## 2021-11-16 ENCOUNTER — TELEPHONE (OUTPATIENT)
Dept: GASTROENTEROLOGY | Facility: CLINIC | Age: 26
End: 2021-11-16

## 2021-11-16 ENCOUNTER — OFFICE VISIT (OUTPATIENT)
Dept: GASTROENTEROLOGY | Facility: CLINIC | Age: 26
End: 2021-11-16

## 2021-11-16 VITALS — HEIGHT: 66 IN | TEMPERATURE: 96.9 F | BODY MASS INDEX: 26.68 KG/M2 | WEIGHT: 166 LBS

## 2021-11-16 DIAGNOSIS — R10.13 DYSPEPSIA: ICD-10-CM

## 2021-11-16 DIAGNOSIS — Z83.71 FAMILY HISTORY OF POLYPS IN THE COLON: ICD-10-CM

## 2021-11-16 DIAGNOSIS — R10.10 PAIN OF UPPER ABDOMEN: Primary | ICD-10-CM

## 2021-11-16 PROBLEM — E07.9 THYROID DYSFUNCTION: Status: ACTIVE | Noted: 2019-05-28

## 2021-11-16 PROBLEM — R53.83 FATIGUE: Status: ACTIVE | Noted: 2017-08-10

## 2021-11-16 PROBLEM — F41.9 ANXIETY: Status: ACTIVE | Noted: 2019-05-28

## 2021-11-16 PROCEDURE — 99214 OFFICE O/P EST MOD 30 MIN: CPT | Performed by: NURSE PRACTITIONER

## 2021-11-16 NOTE — PROGRESS NOTES
Chief Complaint   Patient presents with   • Abdominal Pain       HPI    Taty Chacko is a  26 y.o. female here establish care as a new patient for abdominal pain.    Patient was seen in the emergency room in November for complaints of chest tightness made worse with eating and swallowing.  Patient reported indigestion persistent despite over-the-counter antacids and Prilosec.  EKG, chest x-ray, and troponins negative.  Patient subsequently diagnosed with gastritis and referred to our services.  She was started on Carafate.    On visit today she reports some improvement with medications following ER visit.  She taking Protonix 40 mg once daily and Carafate on average 3 times a day.  Abdominal pain has lessened.  Dyspepsia improved as well.  Denies nausea or vomiting.  No dysphagia or odynophagia.    Denies change in bowel habits.  She is prone to episodes of diarrhea with certain dietary triggers.  No rectal bleeding.  She does report significant family history of colon polyps.  Her mother gets scheduled colonoscopies every 3 to 5 years and her father gets scheduled EGDs and colonoscopies with his gastroenterologist.  Neither parent has had colon cancer but per the patient have precancerous colon polyps.    Past Medical History:   Diagnosis Date   • Anxiety    • Arthritis    • GERD (gastroesophageal reflux disease)    • Kidney infection    • Kidney stone    • MCL sprain of left knee    • Migraine        Past Surgical History:   Procedure Laterality Date   • KNEE ARTHROSCOPY Right 12/16/2020    Procedure: RIGHT KNEE ARTHROSCOPY, DEBRIDEMENT OF PATELLA;  Surgeon: Raissa Hodges MD;  [place] [facility] [place] [facility] [place]  Service: Orthopedics;  Laterality: Right;   • NO PAST SURGERIES         Scheduled Meds:     Continuous Infusions: No current facility-administered medications for this visit.      PRN Meds:     Allergies   Allergen Reactions   • Hydrocodone Itching and Rash       Social History     Socioeconomic History   •  Marital status: Single   Tobacco Use   • Smoking status: Never Smoker   • Smokeless tobacco: Never Used   Substance and Sexual Activity   • Alcohol use: Yes     Alcohol/week: 4.0 standard drinks     Types: 4 Glasses of wine per week     Comment: social   • Drug use: No   • Sexual activity: Defer       Family History   Problem Relation Age of Onset   • Migraines Paternal Aunt    • Stroke Paternal Grandfather    • Malig Hyperthermia Neg Hx        Review of Systems   Constitutional: Negative for activity change, appetite change, fatigue and unexpected weight change.   HENT: Negative for trouble swallowing.    Eyes: Negative.    Respiratory: Negative.    Cardiovascular: Negative.    Gastrointestinal: Positive for abdominal pain. Negative for abdominal distention, anal bleeding, blood in stool, constipation, diarrhea, nausea, rectal pain and vomiting.   Endocrine: Negative.    Genitourinary: Negative.    Musculoskeletal: Negative.    Allergic/Immunologic: Negative.    Neurological: Negative.    Hematological: Negative.    Psychiatric/Behavioral: Negative.        Vitals:    11/16/21 1111   Temp: 96.9 °F (36.1 °C)       Physical Exam  Constitutional:       Appearance: She is well-developed.   Abdominal:      General: Bowel sounds are normal. There is no distension.      Palpations: Abdomen is soft. There is no mass.      Tenderness: There is no abdominal tenderness. There is no guarding.      Hernia: No hernia is present.   Skin:     General: Skin is warm and dry.      Capillary Refill: Capillary refill takes less than 2 seconds.   Neurological:      Mental Status: She is alert and oriented to person, place, and time.   Psychiatric:         Behavior: Behavior normal.     Assessment    Diagnoses and all orders for this visit:    1. Pain of upper abdomen (Primary)  -     Case Request; Standing  -     Case Request    2. Dyspepsia  -     Case Request; Standing  -     Case Request    3. Family history of polyps in the colon  -      Case Request; Standing  -     Case Request    Plan    Very pleasant 26-year-old female seen today for upper abdominal pain and dyspepsia with significant family history of colon polyps.  Recommend bidirectional endoscopic evaluation with Dr. Fong rule out acid related damage, celiac disease, and screen for colon polyps.  Continue Protonix 40 mg once daily.  Continue Carafate at least twice a day dosing.  Avoid NSAIDs.  Follow an antireflux diet.  Pending endoscopic results consider gallbladder evaluation.  Patient to follow-up back in the office after procedures have been completed to discuss results.         DENNYS Rick  [facility]  [address] [place] [address]  Office: [phone]

## 2021-11-16 NOTE — TELEPHONE ENCOUNTER
cs, egd arrive at 1200 on 01/06 sw April- gave pt prep inst on 11/16 advised pt time is subject to change [facility] will call

## 2021-12-26 ENCOUNTER — HOSPITAL ENCOUNTER (EMERGENCY)
Facility: HOSPITAL | Age: 26
Discharge: HOME | End: 2021-12-26
Attending: EMERGENCY MEDICINE | Admitting: EMERGENCY MEDICINE

## 2021-12-26 ENCOUNTER — APPOINTMENT (OUTPATIENT)
Dept: CT IMAGING | Facility: HOSPITAL | Age: 26
End: 2021-12-26

## 2021-12-26 VITALS
RESPIRATION RATE: 18 BRPM | TEMPERATURE: 98 F | DIASTOLIC BLOOD PRESSURE: 67 MMHG | SYSTOLIC BLOOD PRESSURE: 99 MMHG | HEIGHT: 66 IN | OXYGEN SATURATION: 100 % | WEIGHT: 166 LBS | HEART RATE: 106 BPM | BODY MASS INDEX: 26.68 KG/M2

## 2021-12-26 DIAGNOSIS — A09 INFECTIOUS COLITIS: Primary | ICD-10-CM

## 2021-12-26 LAB
ALBUMIN SERPL-MCNC: 4.2 G/DL (ref 3.5–5.2)
ALBUMIN/GLOB SERPL: 1.4 G/DL
ALP SERPL-CCNC: 88 U/L (ref 39–117)
ALT SERPL W P-5'-P-CCNC: 11 U/L (ref 1–33)
ANION GAP SERPL CALCULATED.3IONS-SCNC: 9.7 MMOL/L (ref 5–15)
AST SERPL-CCNC: 14 U/L (ref 1–32)
BASOPHILS # BLD AUTO: 0.04 10*3/MM3 (ref 0–0.2)
BASOPHILS NFR BLD AUTO: 0.3 % (ref 0–1.5)
BILIRUB SERPL-MCNC: 0.3 MG/DL (ref 0–1.2)
BUN SERPL-MCNC: 8 MG/DL (ref 6–20)
BUN/CREAT SERPL: 9.5 (ref 7–25)
CALCIUM SPEC-SCNC: 9.2 MG/DL (ref 8.6–10.5)
CHLORIDE SERPL-SCNC: 105 MMOL/L (ref 98–107)
CO2 SERPL-SCNC: 26.3 MMOL/L (ref 22–29)
CREAT SERPL-MCNC: 0.84 MG/DL (ref 0.57–1)
DEPRECATED RDW RBC AUTO: 39.9 FL (ref 37–54)
EOSINOPHIL # BLD AUTO: 0.01 10*3/MM3 (ref 0–0.4)
EOSINOPHIL NFR BLD AUTO: 0.1 % (ref 0.3–6.2)
ERYTHROCYTE [DISTWIDTH] IN BLOOD BY AUTOMATED COUNT: 13.6 % (ref 12.3–15.4)
GFR SERPL CREATININE-BSD FRML MDRD: 81 ML/MIN/1.73
GLOBULIN UR ELPH-MCNC: 3.1 GM/DL
GLUCOSE SERPL-MCNC: 87 MG/DL (ref 65–99)
HCG SERPL QL: NEGATIVE
HCT VFR BLD AUTO: 40.3 % (ref 34–46.6)
HGB BLD-MCNC: 13.5 G/DL (ref 12–15.9)
IMM GRANULOCYTES # BLD AUTO: 0.05 10*3/MM3 (ref 0–0.05)
IMM GRANULOCYTES NFR BLD AUTO: 0.3 % (ref 0–0.5)
LIPASE SERPL-CCNC: 20 U/L (ref 13–60)
LYMPHOCYTES # BLD AUTO: 0.82 10*3/MM3 (ref 0.7–3.1)
LYMPHOCYTES NFR BLD AUTO: 5.6 % (ref 19.6–45.3)
MCH RBC QN AUTO: 27.4 PG (ref 26.6–33)
MCHC RBC AUTO-ENTMCNC: 33.5 G/DL (ref 31.5–35.7)
MCV RBC AUTO: 81.7 FL (ref 79–97)
MONOCYTES # BLD AUTO: 1.42 10*3/MM3 (ref 0.1–0.9)
MONOCYTES NFR BLD AUTO: 9.7 % (ref 5–12)
NEUTROPHILS NFR BLD AUTO: 12.28 10*3/MM3 (ref 1.7–7)
NEUTROPHILS NFR BLD AUTO: 84 % (ref 42.7–76)
NRBC BLD AUTO-RTO: 0 /100 WBC (ref 0–0.2)
PLATELET # BLD AUTO: 293 10*3/MM3 (ref 140–450)
PMV BLD AUTO: 10.4 FL (ref 6–12)
POTASSIUM SERPL-SCNC: 3.7 MMOL/L (ref 3.5–5.2)
PROT SERPL-MCNC: 7.3 G/DL (ref 6–8.5)
RBC # BLD AUTO: 4.93 10*6/MM3 (ref 3.77–5.28)
SODIUM SERPL-SCNC: 141 MMOL/L (ref 136–145)
WBC NRBC COR # BLD: 14.62 10*3/MM3 (ref 3.4–10.8)

## 2021-12-26 PROCEDURE — 25010000002 IOPAMIDOL 61 % SOLUTION: Performed by: EMERGENCY MEDICINE

## 2021-12-26 PROCEDURE — 74177 CT ABD & PELVIS W/CONTRAST: CPT

## 2021-12-26 PROCEDURE — 80053 COMPREHEN METABOLIC PANEL: CPT | Performed by: EMERGENCY MEDICINE

## 2021-12-26 PROCEDURE — 25010000002 PROCHLORPERAZINE 10 MG/2ML SOLUTION: Performed by: EMERGENCY MEDICINE

## 2021-12-26 PROCEDURE — 84703 CHORIONIC GONADOTROPIN ASSAY: CPT | Performed by: EMERGENCY MEDICINE

## 2021-12-26 PROCEDURE — 96374 THER/PROPH/DIAG INJ IV PUSH: CPT

## 2021-12-26 PROCEDURE — 25010000002 ONDANSETRON PER 1 MG: Performed by: EMERGENCY MEDICINE

## 2021-12-26 PROCEDURE — 99283 EMERGENCY DEPT VISIT LOW MDM: CPT

## 2021-12-26 PROCEDURE — 83690 ASSAY OF LIPASE: CPT | Performed by: EMERGENCY MEDICINE

## 2021-12-26 PROCEDURE — 85025 COMPLETE CBC W/AUTO DIFF WBC: CPT | Performed by: EMERGENCY MEDICINE

## 2021-12-26 PROCEDURE — 96375 TX/PRO/DX INJ NEW DRUG ADDON: CPT

## 2021-12-26 RX ORDER — PROCHLORPERAZINE EDISYLATE 5 MG/ML
10 INJECTION INTRAMUSCULAR; INTRAVENOUS ONCE
Status: COMPLETED | OUTPATIENT
Start: 2021-12-26 | End: 2021-12-26

## 2021-12-26 RX ORDER — METRONIDAZOLE 500 MG/1
500 TABLET ORAL 3 TIMES DAILY
Qty: 30 TABLET | Refills: 0 | Status: SHIPPED | OUTPATIENT
Start: 2021-12-26 | End: 2022-01-18

## 2021-12-26 RX ORDER — ONDANSETRON 2 MG/ML
4 INJECTION INTRAMUSCULAR; INTRAVENOUS ONCE
Status: COMPLETED | OUTPATIENT
Start: 2021-12-26 | End: 2021-12-26

## 2021-12-26 RX ORDER — PROMETHAZINE HYDROCHLORIDE 25 MG/1
25 TABLET ORAL EVERY 6 HOURS PRN
Qty: 10 TABLET | Refills: 0 | Status: SHIPPED | OUTPATIENT
Start: 2021-12-26 | End: 2022-01-18

## 2021-12-26 RX ADMIN — PROCHLORPERAZINE EDISYLATE 10 MG: 5 INJECTION INTRAMUSCULAR; INTRAVENOUS at 12:42

## 2021-12-26 RX ADMIN — IOPAMIDOL 85 ML: 612 INJECTION, SOLUTION INTRAVENOUS at 11:59

## 2021-12-26 RX ADMIN — SODIUM CHLORIDE 1000 ML: 9 INJECTION, SOLUTION INTRAVENOUS at 10:24

## 2021-12-26 RX ADMIN — ONDANSETRON 4 MG: 2 INJECTION INTRAMUSCULAR; INTRAVENOUS at 10:25

## 2021-12-26 NOTE — DISCHARGE INSTRUCTIONS
Take Flagyl as directed.  Avoid alcohol while taking Flagyl.  Drink plenty of fluids and get plenty of rest.  Hopefully symptoms will resolve in the next 3 to 4 days.

## 2021-12-26 NOTE — ED PROVIDER NOTES
EMERGENCY DEPARTMENT ENCOUNTER    Room Number:  33/33  Date of encounter:  12/26/2021  PCP: Provider, No Known  Historian: Patient     I used full protective equipment while examining this patient.  This includes face mask, gloves and protective eyewear.  I washed my hands before entering the room and immediately upon leaving the room      HPI:  Chief Complaint: Vomiting and diarrhea  A complete HPI/ROS/PMH/PSH/SH/FH are unobtainable due to: None    Context: Taty Chacko is a 26 y.o. female who presents to the ED c/o vomiting and diarrhea.  Patient has had a history of ongoing nausea and diarrhea but symptoms have been worsened over the last roughly 5 days.  She did have fever of 101.5 recently.  She does complain of generalized abdominal pain which is worse in the upper abdomen and worsened with movements.  Pain is mild at rest and becomes moderate to severe with certain movements or palpation.  Patient denies any other prior abdominal surgeries although she states she does have a family history of colon cancer and diverticulitis.  Patient works as a  at [employer].      MEDICAL RECORD REVIEW  I reviewed prior medical records note patient was seen just over 1 month ago in our ED for chest pain which was felt to be related to GERD.  Patient was seen in the GI clinic related to upper abdominal pain.  There are plans for endoscopic evaluation in the coming future.    PAST MEDICAL HISTORY  Active Ambulatory Problems     Diagnosis Date Noted   • Headache, migraine 10/18/2016   • Migraine without aura with status migrainosus 10/22/2018   • Chronic pain of right knee 12/10/2020   • Anxiety 05/28/2019   • Fatigue 08/10/2017   • Thyroid dysfunction 05/28/2019     Resolved Ambulatory Problems     Diagnosis Date Noted   • No Resolved Ambulatory Problems     Past Medical History:   Diagnosis Date   • Arthritis    • GERD (gastroesophageal reflux disease)    • Kidney infection    • Kidney stone    •  MCL sprain of left knee    • Migraine          PAST SURGICAL HISTORY  Past Surgical History:   Procedure Laterality Date   • KNEE ARTHROSCOPY Right 12/16/2020    Procedure: RIGHT KNEE ARTHROSCOPY, DEBRIDEMENT OF PATELLA;  Surgeon: Raissa Hodges MD;  [place] [facility] [place] [facility] [place]: [place]opedics;  Laterality: Right;   • NO PAST SURGERIES           FAMILY HISTORY  Family History   Problem Relation Age of Onset   • Migraines Paternal Aunt    • Stroke Paternal Grandfather    • Malig Hyperthermia Neg Hx          SOCIAL HISTORY  Social History     Socioeconomic History   • Marital status: Single   Tobacco Use   • Smoking status: Never Smoker   • Smokeless tobacco: Never Used   Substance and Sexual Activity   • Alcohol use: Yes     Alcohol/week: 4.0 standard drinks     Types: 4 Glasses of wine per week     Comment: social   • Drug use: No   • Sexual activity: Defer         ALLERGIES  Hydrocodone       REVIEW OF SYSTEMS  Review of Systems   Constitutional: Positive for fatigue and fever.   HENT: Negative.  Negative for sore throat.    Eyes: Negative.    Respiratory: Negative.  Negative for cough.    Cardiovascular: Negative.  Negative for chest pain.   Gastrointestinal: Positive for abdominal pain, diarrhea and nausea.   Genitourinary: Negative.  Negative for dysuria.   Musculoskeletal: Negative.  Negative for back pain.   Skin: Negative.  Negative for rash.   Neurological: Negative.  Negative for headaches.   All other systems reviewed and are negative.          PHYSICAL EXAM    I have reviewed the triage vital signs and nursing notes.    ED Triage Vitals [12/26/21 0558]   Temp Heart Rate Resp BP SpO2   98 °F (36.7 °C) (!) 129 18 109/82 98 %      Temp src Heart Rate Source Patient Position BP Location FiO2 (%)   Tympanic -- -- -- --       Physical Exam  GENERAL: Alert and pleasant female in no obvious distress.  Triage vitals reviewed notable for elevated pulse of 129.  Temp 98.  BP and O2 saturations  reassuring  HENT: nares patent  EYES: no scleral icterus  CV: Slightly tachycardic without murmur  RESPIRATORY: normal effort, clear to auscultation bilaterally  ABDOMEN: soft, moderate diffuse tenderness to palpation without rebound or guarding  MUSCULOSKELETAL: no deformity  NEURO: Strength sensation and coordination are grossly intact.  Speech and mentation are unremarkable  SKIN: warm, dry      LAB RESULTS  Recent Results (from the past 24 hour(s))   Comprehensive Metabolic Panel    Collection Time: 12/26/21 10:25 AM    Specimen: Blood   Result Value Ref Range    Glucose 87 65 - 99 mg/dL    BUN 8 6 - 20 mg/dL    Creatinine 0.84 0.57 - 1.00 mg/dL    Sodium 141 136 - 145 mmol/L    Potassium 3.7 3.5 - 5.2 mmol/L    Chloride 105 98 - 107 mmol/L    CO2 26.3 22.0 - 29.0 mmol/L    Calcium 9.2 8.6 - 10.5 mg/dL    Total Protein 7.3 6.0 - 8.5 g/dL    Albumin 4.20 3.50 - 5.20 g/dL    ALT (SGPT) 11 1 - 33 U/L    AST (SGOT) 14 1 - 32 U/L    Alkaline Phosphatase 88 39 - 117 U/L    Total Bilirubin 0.3 0.0 - 1.2 mg/dL    eGFR Non African Amer 81 >60 mL/min/1.73    Globulin 3.1 gm/dL    A/G Ratio 1.4 g/dL    BUN/Creatinine Ratio 9.5 7.0 - 25.0    Anion Gap 9.7 5.0 - 15.0 mmol/L   hCG, Serum, Qualitative    Collection Time: 12/26/21 10:25 AM    Specimen: Blood   Result Value Ref Range    HCG Qualitative Negative Negative   Lipase    Collection Time: 12/26/21 10:25 AM    Specimen: Blood   Result Value Ref Range    Lipase 20 13 - 60 U/L   CBC Auto Differential    Collection Time: 12/26/21 10:26 AM    Specimen: Blood   Result Value Ref Range    WBC 14.62 (H) 3.40 - 10.80 10*3/mm3    RBC 4.93 3.77 - 5.28 10*6/mm3    Hemoglobin 13.5 12.0 - 15.9 g/dL    Hematocrit 40.3 34.0 - 46.6 %    MCV 81.7 79.0 - 97.0 fL    MCH 27.4 26.6 - 33.0 pg    MCHC 33.5 31.5 - 35.7 g/dL    RDW 13.6 12.3 - 15.4 %    RDW-SD 39.9 37.0 - 54.0 fl    MPV 10.4 6.0 - 12.0 fL    Platelets 293 140 - 450 10*3/mm3    Neutrophil % 84.0 (H) 42.7 - 76.0 %    Lymphocyte %  5.6 (L) 19.6 - 45.3 %    Monocyte % 9.7 5.0 - 12.0 %    Eosinophil % 0.1 (L) 0.3 - 6.2 %    Basophil % 0.3 0.0 - 1.5 %    Immature Grans % 0.3 0.0 - 0.5 %    Neutrophils, Absolute 12.28 (H) 1.70 - 7.00 10*3/mm3    Lymphocytes, Absolute 0.82 0.70 - 3.10 10*3/mm3    Monocytes, Absolute 1.42 (H) 0.10 - 0.90 10*3/mm3    Eosinophils, Absolute 0.01 0.00 - 0.40 10*3/mm3    Basophils, Absolute 0.04 0.00 - 0.20 10*3/mm3    Immature Grans, Absolute 0.05 0.00 - 0.05 10*3/mm3    nRBC 0.0 0.0 - 0.2 /100 WBC       Ordered the above labs and independently reviewed the results.      RADIOLOGY  No Radiology Exams Resulted Within Past 24 Hours    I ordered the above noted radiological studies. Reviewed by me and discussed with radiologist.  See dictation for official radiology interpretation.      PROCEDURES  Procedures      MEDICATIONS GIVEN IN ER    Medications   sodium chloride 0.9 % bolus 1,000 mL (0 mL Intravenous Stopped 12/26/21 1242)   ondansetron (ZOFRAN) injection 4 mg (4 mg Intravenous Given 12/26/21 1025)   prochlorperazine (COMPAZINE) injection 10 mg (10 mg Intravenous Given 12/26/21 1242)   iopamidol (ISOVUE-300) 61 % injection 85 mL (85 mL Intravenous Given 12/26/21 1159)         PROGRESS, DATA ANALYSIS, CONSULTS, AND MEDICAL DECISION MAKING    All labs have been independently reviewed by me.  All radiology studies have been reviewed by me and discussed with radiologist dictating the report.   EKG's independently viewed and interpreted by me.  Discussion below represents my analysis of pertinent findings related to patient's condition, differential diagnosis, treatment plan and final disposition.      ED Course as of 12/26/21 1306   Sun Dec 26, 2021   0949 [id number]-old female presents with worsening nausea vomiting and diarrhea as well as abdominal pain over the last roughly 5 days.  She has had recent low-grade fever.  Exam shows mild generalized tenderness.  We will ahead and give some IV fluids and IV  antiemetics.  Will check blood work including CBC chemistries and lipase.  Anticipate need for CAT scan given patient's tenderness to palpation.  Would consider colitis, diverticulitis appendicitis and cholecystitis among a broad differential diagnosis for abdominal pain with nausea vomiting diarrhea. [DB]   0950 Labs reviewed notable for elevated white blood cell count of 14.6, worrisome for possible bacterial infection such as colitis, diverticulitis or appendicitis. [DB]   1114 Chemistries are unremarkable without significant dehydration.  We will go ahead and get CT scan of the abdomen to assess for patient with nausea vomiting diarrhea and leukocytosis. [DB]   1126 I discussed results of labs with patient at bedside.  She is feeling better after IV fluids and IV antiemetics but still has some nausea.  We will go ahead and try some IV Compazine.  Will get CT scan for further assessment. [DB]   1300 I discussed CT scan with Dr. Jain.  He reports that there is generalized thickening of the colon consistent with likely colitis.  Results of CT scan shared with patient at bedside.  She feels she is unable to give us a stool specimen at this time. [DB]   1303 Patient states now that she had been on Augmentin recently for a dog bite sustained at work.  She finished the Augmentin on Friday.  The suggest that the diarrhea could be C. difficile or could be diarrhea related to Augmentin.  We will go ahead and cover with Flagyl. [DB]      ED Course User Index  [DB] Azeem Biggs MD       AS OF 13:06 EST VITALS:    BP - 117/66  HR - 113  TEMP - 98 °F (36.7 °C) (Tympanic)  O2 SATS - 100%      DIAGNOSIS  Final diagnoses:   Infectious colitis         DISPOSITION  DISCHARGE    Patient discharged in stable condition.    Reviewed implications of results, diagnosis, meds, responsibility to follow up, warning signs and symptoms of possible worsening, potential complications and reasons to return to ER, including worsening  symptoms or as needed.    Patient/Family voiced understanding of above instructions.    Discussed plan for discharge, as there is no emergent indication for admission. Patient referred to primary care provider for BP management due to today's BP. Pt/family is agreeable and understands need for follow up and repeat testing.  Pt is aware that discharge does not mean that nothing is wrong but it indicates no emergency is present that requires admission and they must continue care with follow-up as given below or physician of their choice.     FOLLOW-UP  PATIENT CONNECTION - [place] [address]  [phone]  In 1 week  Call for Appointment, If Not Better         Medication List      New Prescriptions    metroNIDAZOLE 500 MG tablet  Commonly known as: FLAGYL  Take 1 tablet by mouth 3 (Three) Times a Day.     promethazine 25 MG tablet  Commonly known as: PHENERGAN  Take 1 tablet by mouth Every 6 (Six) Hours As Needed for Nausea or Vomiting.           Where to Get Your Medications      These medications were sent to [facility]/pharmacy #[id number] - [place], [place] - [address] - [phone] [facility] [phone]   [address], [facility] [address]    Phone: [phone]   · metroNIDAZOLE 500 MG tablet  · promethazine 25 MG tablet              Azeem Biggs MD  12/26/21 1223

## 2021-12-26 NOTE — ED TRIAGE NOTES
Pt to ER from home via PV with c/o fever, n/v/d and abd for approx 3 days. Pt states she is unable to keep anything down. Pt states fever has been persistent and low grade, despite taking tylenol q4hr (unknown mg). Pt also reporting some blood in her stool and emesis.       Pt masked in triage, staff in appropriate ppe.

## 2021-12-27 ENCOUNTER — TELEPHONE (OUTPATIENT)
Dept: GASTROENTEROLOGY | Facility: CLINIC | Age: 26
End: 2021-12-27

## 2021-12-27 NOTE — TELEPHONE ENCOUNTER
Please call pt she would like her scope rescheduled. She is wanting it done at [facility]. [phone]

## 2022-01-03 ENCOUNTER — TELEPHONE (OUTPATIENT)
Dept: GASTROENTEROLOGY | Facility: CLINIC | Age: 27
End: 2022-01-03

## 2022-01-03 PROBLEM — R10.10 PAIN OF UPPER ABDOMEN: Status: ACTIVE | Noted: 2022-01-03

## 2022-01-03 PROBLEM — R10.13 DYSPEPSIA: Status: ACTIVE | Noted: 2022-01-03

## 2022-01-03 PROBLEM — Z83.71 FAMILY HISTORY OF POLYPS IN THE COLON: Status: ACTIVE | Noted: 2022-01-03

## 2022-01-03 PROBLEM — Z83.719 FAMILY HISTORY OF POLYPS IN THE COLON: Status: ACTIVE | Noted: 2022-01-03

## 2022-01-03 NOTE — TELEPHONE ENCOUNTER
RSRACHELL Sinha for colonoscopy/EGD on 04/15/2022  arrive at 830am   . Mailed Prep instructions to Mailing address on-file. ----miralax      Advised PT  that  will call with final arrival time  24 hrs before procedure. If they do not get a phone call, arrival time will stay the same as given on instructions

## 2022-01-03 NOTE — TELEPHONE ENCOUNTER
Good Morning,    Patient called this morning to reschedule her procedure with Dr. Fong on Jan 6th. Please call patient and reschedule.

## 2022-01-13 VITALS — HEIGHT: 66 IN | BODY MASS INDEX: 26.81 KG/M2

## 2022-01-13 RX ORDER — RIZATRIPTAN BENZOATE 5 MG/1
5 TABLET, ORALLY DISINTEGRATING ORAL ONCE AS NEEDED
COMMUNITY
End: 2022-02-03

## 2022-01-13 RX ORDER — IBUPROFEN 200 MG
200 TABLET ORAL EVERY 6 HOURS PRN
COMMUNITY
End: 2022-01-18

## 2022-01-13 NOTE — PROGRESS NOTES
General Surgery History and Physical      Summary:    Mrs. Taty Chacko is a 26 y.o. lady with abdominal pain, nausea, vomiting, and weight loss.  Scheduled for bidirectional endoscopy.  Will obtain right upper quadrant ultrasound and HIDA scan to further delineate the etiology of her abdominal pain.  I will call her with these results.    Referring Provider: No ref. provider found    Chief Complaint:    Abdominal pain    History of Present Illness:    Mrs. Taty Chacko is a 26 y.o. lady who presents to the office with severe heartburn and vomiting that began in November.  She went to the ER at this time and was sent home on Carafate and PPI.  She said this helped intermittently but is still having significant problems.  In December she was started on amoxicillin after a dog bite at her work.  She then developed severe vomiting and diarrhea.  She is lost 20 pounds due to her diarrhea and inability to tolerate p.o.  She returned to the ER and CT scan was overall unremarkable.  She is still having significant problems especially after eating.    Past Medical History:   • None    Past Surgical History:    • R knee arthroscopy    Family History:    • No family history of colon cancer    Social History:    • Denies tobacco use  • Occasional alcohol use    Allergies:   Allergies   Allergen Reactions   • Hydrocodone Itching and Rash       Medications:     Current Outpatient Medications:   •  Diclofenac Sodium (VOLTAREN) 1 % gel gel, Apply 4 g topically to the appropriate area as directed 4 (Four) Times a Day As Needed (muscle pain)., Disp: 100 g, Rfl: 0  •  hydrOXYzine (ATARAX) 25 MG tablet, Take 25-50 mg by mouth Every 6 (Six) Hours As Needed., Disp: , Rfl:   •  levonorgestrel (KYLEENA) 19.5 MG intrauterine device IUD, 1 each by Intrauterine route 1 (One) Time., Disp: , Rfl:   •  metroNIDAZOLE (FLAGYL) 500 MG tablet, Take 1 tablet by mouth 3 (Three) Times a Day., Disp: 30 tablet, Rfl: 0  •  minocycline  Patient to and from CT scan with RN, khoi, and on the monitor. While in CT scan patient had seizure at 1834 lasting about 1 minute. Placed on non-rebreather mask at that time, no injury during this episode.   1844 patient had a second tonic clonic seizure (MINOCIN,DYNACIN) 50 MG capsule, Take 50 mg by mouth 2 (Two) Times a Day., Disp: , Rfl: 5  •  Multiple Vitamins-Minerals (MULTIVITAMIN ADULT PO), Take 1 tablet by mouth Daily., Disp: , Rfl:   •  pantoprazole (PROTONIX) 40 MG EC tablet, Take 1 tablet by mouth Daily., Disp: 15 tablet, Rfl: 0  •  promethazine (PHENERGAN) 25 MG tablet, Take 1 tablet by mouth Every 6 (Six) Hours As Needed for Nausea or Vomiting., Disp: 10 tablet, Rfl: 0  •  topiramate (TOPAMAX) 50 MG tablet, Take 1 tablet by mouth 2 (Two) Times a Day., Disp: 60 tablet, Rfl: 3    Radiology/Endoscopy:    • 12/26/2021 CT abdomen/pelvis reviewed; thickening of colon extending into the rectosigmoid colon     Labs:    • Labs from 12/26/2021 reviewed    Review of Systems:   Influenza-like illness: no fever, no  cough, no  sore throat, no  body aches, no loss of sense of taste or smell, no known exposure to person with Covid-19.  Constitutional: Negative for fevers or chills  HENT: Negative for hearing loss or runny nose  Eyes: Negative for vision changes or scleral icterus  Respiratory: Negative for cough or shortness of breath  Cardiovascular: Negative for chest pain or heart palpitations  Gastrointestinal: + for abdominal pain, nausea, vomiting; Negative for constipation, melena, or hematochezia  Genitourinary: Negative for hematuria or dysuria  Musculoskeletal: Negative for joint swelling or gait instability  Neurologic: Negative for tremors or seizures  Psychiatric: Negative for suicidal ideations or depression  All other systems reviewed and negative    Physical Exam:   • Constitutional: Well-developed well-nourished, no acute distress  • Eyes: Conjunctiva normal, sclera nonicteric  • ENMT: Hearing grossly normal, oral mucosa moist  • Neck: Supple, no palpable mass, trachea midline  • Respiratory: No increased work of breathing, normal inspiratory effort  • Cardiovascular: Regular rate, no peripheral edema, no jugular venous distention  • Gastrointestinal:  Soft, nontender  • Skin:  Warm, dry, no rash on visualized skin surfaces  • Musculoskeletal: Symmetric strength, normal gait  • Psychiatric: Alert and oriented ×3, normal affect       Bushra Loyola M.D.  General and Endoscopic Surgery  [facility]    [address], [address]  [place], [place], [address]  P: [phone]  F: [phone]

## 2022-01-14 VITALS — HEIGHT: 66 IN | BODY MASS INDEX: 26.81 KG/M2

## 2022-01-18 ENCOUNTER — OFFICE VISIT (OUTPATIENT)
Dept: SURGERY | Facility: CLINIC | Age: 27
End: 2022-01-18

## 2022-01-18 ENCOUNTER — TELEPHONE (OUTPATIENT)
Dept: GASTROENTEROLOGY | Facility: CLINIC | Age: 27
End: 2022-01-18

## 2022-01-18 VITALS — BODY MASS INDEX: 25.55 KG/M2 | WEIGHT: 159 LBS | HEIGHT: 66 IN

## 2022-01-18 DIAGNOSIS — R10.84 GENERALIZED ABDOMINAL PAIN: Primary | ICD-10-CM

## 2022-01-18 PROCEDURE — 99203 OFFICE O/P NEW LOW 30 MIN: CPT | Performed by: STUDENT IN AN ORGANIZED HEALTH CARE EDUCATION/TRAINING PROGRAM

## 2022-01-18 RX ORDER — PANTOPRAZOLE SODIUM 40 MG/1
40 TABLET, DELAYED RELEASE ORAL DAILY
Qty: 30 TABLET | Refills: 3 | Status: SHIPPED | OUTPATIENT
Start: 2022-01-18 | End: 2022-02-03 | Stop reason: SDUPTHER

## 2022-01-18 NOTE — TELEPHONE ENCOUNTER
NAT Ingram for colonoscopy/EGD on 04/15 to 02/18  arrive at 1pm   . Mailed Prep instructions to Mailing address on-file. ----miralax

## 2022-01-18 NOTE — TELEPHONE ENCOUNTER
----- Message from DENNYS Rick sent at 1/18/2022  3:49 PM EST -----  Regarding: FW: Endoscopy  Needs EGD and colonoscopy please overbook [facility] next available.  Thanks Latasha  ----- Message -----  From: Bushra Loyola MD  Sent: 1/18/2022   2:38 PM EST  To: DENNYS Rick  Subject: Endoscopy                                        Hi Latasha,     I saw Taty in the office today for abdominal pain. I believe you saw her last month. She was in tears, stating she's lost 20 pounds, having significant symptoms, etc. I ordered a RUQ US and HIDA but told her I'd reach out to you to see if it's possible to move up her endoscopy currently scheduled for April to try to get a diagnosis sooner. I know the schedules over there are probably pretty booked as well but thought it would be worth checking.     Thanks!  Bushra

## 2022-02-03 ENCOUNTER — OFFICE VISIT (OUTPATIENT)
Dept: INTERNAL MEDICINE | Facility: CLINIC | Age: 27
End: 2022-02-03

## 2022-02-03 VITALS
BODY MASS INDEX: 25.55 KG/M2 | TEMPERATURE: 97.1 F | SYSTOLIC BLOOD PRESSURE: 100 MMHG | HEART RATE: 64 BPM | HEIGHT: 66 IN | WEIGHT: 159 LBS | OXYGEN SATURATION: 98 % | DIASTOLIC BLOOD PRESSURE: 60 MMHG

## 2022-02-03 DIAGNOSIS — Z11.59 NEED FOR HEPATITIS C SCREENING TEST: ICD-10-CM

## 2022-02-03 DIAGNOSIS — H00.015 HORDEOLUM EXTERNUM OF LEFT LOWER EYELID: ICD-10-CM

## 2022-02-03 DIAGNOSIS — F32.A ANXIETY AND DEPRESSION: Primary | ICD-10-CM

## 2022-02-03 DIAGNOSIS — F41.9 ANXIETY AND DEPRESSION: Primary | ICD-10-CM

## 2022-02-03 DIAGNOSIS — D72.829 LEUKOCYTOSIS, UNSPECIFIED TYPE: ICD-10-CM

## 2022-02-03 PROCEDURE — 99204 OFFICE O/P NEW MOD 45 MIN: CPT | Performed by: STUDENT IN AN ORGANIZED HEALTH CARE EDUCATION/TRAINING PROGRAM

## 2022-02-03 RX ORDER — ESCITALOPRAM OXALATE 10 MG/1
10 TABLET ORAL DAILY
Qty: 30 TABLET | Refills: 1 | Status: SHIPPED | OUTPATIENT
Start: 2022-02-03 | End: 2022-03-09 | Stop reason: SDUPTHER

## 2022-02-03 NOTE — PROGRESS NOTES
Aryan Naik D.O.  Internal Medicine  [facility]  [address]  [place] [address]  [phone]      Chief Complaint  Establish Care (stomach issue)    SUBJECTIVE    History of Present Illness    Taty Chacko is a 26 y.o. female who presents to the office today as a new patient to establish care.   Previously saw Ja Forbes , doctor with [facility], but last visit has been several years.     Anxiety: takes hydroxyzine 25 mg as needed for panic attacks and she uses that a couple times in a month. She feels that she has a high level of anxiety anyway. Her mom used to say that she has always been a worrier. She has done counseling but not done any in a year, previously saw [facility]. She was previously on prozac and zoloft. She states the zoloft caused really bad mood swings. She states the prozac helped but then seemed to stop working. She has not been on either of those for several years.     Ache: previously took minocycline, was prescribed by [facility]. Has not been on for a few months.    GERD: takes pantoprazole 40 mg daily . She had to go to ER for incredible heartburn that she could not get to go away. They referred her to GI doctor and are worried about an ulcer. Has lower and upper scope scheduled for next month. Also saw surgery who is planning to do HIDA scan and RUQ Ultrasound.     Migraines: pt states she has a family history of migraines, she sees a neurologist here with [facility]. States she takes Ubrelvy 3 or 4 times a month. She had a Rx for topiramate but doesn't take that as often. She may take it once a week or so.     Stye left eye: been there since November, very itchy. This one doesn't hurt. She used the OTC stye drop this morning, states it has not helped any really.       Allergies   Allergen Reactions   • Hydrocodone Itching and Rash        Outpatient Medications Marked as Taking for the 2/3/22 encounter (Office  Visit) with Aryan Naik DO   Medication Sig Dispense Refill   • hydrOXYzine (ATARAX) 25 MG tablet Take 25-50 mg by mouth Every 6 (Six) Hours As Needed.     • levonorgestrel (KYLEENA) 19.5 MG intrauterine device IUD 1 each by Intrauterine route 1 (One) Time.     • Multiple Vitamins-Minerals (MULTIVITAMIN ADULT PO) Take 1 tablet by mouth Daily.     • pantoprazole (PROTONIX) 40 MG EC tablet Take 1 tablet by mouth Daily. 15 tablet 0   • topiramate (TOPAMAX) 50 MG tablet Take 1 tablet by mouth 2 (Two) Times a Day. 60 tablet 3   • Ubrogepant (UBRELVY PO) Take 1 tablet/day by mouth Daily As Needed.          Past Medical History:   Diagnosis Date   • Acne    • Anxiety    • Arthritis    • GERD (gastroesophageal reflux disease)    • Kidney infection    • Kidney stone    • MCL sprain of left knee    • Migraine    • Thyroid nodule     colloid cysts   • Torn meniscus     right   • UTI (urinary tract infection)      Past Surgical History:   Procedure Laterality Date   • KNEE ARTHROSCOPY Right 12/16/2020    Procedure: RIGHT KNEE ARTHROSCOPY, DEBRIDEMENT OF PATELLA;  Surgeon: Raissa Hodges MD;  Location: [facility] OR [facility];  Service: Orthopedics;  Laterality: Right;     Family History   Problem Relation Age of Onset   • Migraines Paternal Aunt    • Stroke Paternal Grandfather    • Hypertension Father    • Arthritis Father    • Hyperlipidemia Father    • Asthma Mother    • Other Mother         Hx of colon polyps   • Ovarian cysts Mother    • Anxiety disorder Mother    • Depression Mother    • Diabetes Sister    • Colon cancer Maternal Uncle    • Anxiety disorder Brother    • Depression Brother    • Malig Hyperthermia Neg Hx     reports that she has never smoked. She has never used smokeless tobacco. She reports current alcohol use of about 2.0 standard drinks of alcohol per week. She reports current drug use. Drug: Marijuana.    OBJECTIVE    Vital Signs:   /60   Pulse 64   Temp 97.1 °F (36.2 °C) (Temporal)   Ht 167.6 cm  "(65.98\")   Wt 72.1 kg (159 lb)   SpO2 98%   BMI 25.68 kg/m²     Physical Exam  Vitals reviewed.   Constitutional:       General: She is not in acute distress.     Appearance: Normal appearance. She is not ill-appearing.   HENT:      Head: Atraumatic.   Eyes:      General: No scleral icterus.     Comments: Left lower lid: Erythematous swelling on the medial to lateral aspect consistent with a stye   Cardiovascular:      Rate and Rhythm: Normal rate and regular rhythm.      Heart sounds: Normal heart sounds. No murmur heard.      Pulmonary:      Effort: Pulmonary effort is normal. No respiratory distress.      Breath sounds: Normal breath sounds. No wheezing.   Abdominal:      General: Bowel sounds are normal.      Palpations: Abdomen is soft.      Tenderness: There is abdominal tenderness (Epigastric).   Musculoskeletal:      Right lower leg: No edema.      Left lower leg: No edema.   Neurological:      Mental Status: She is alert.   Psychiatric:         Mood and Affect: Mood normal.         Behavior: Behavior normal.            The following data was reviewed by: Aryan Naik DO on 02/03/2022:  Common labs    Common Labsle 11/11/21 11/11/21 12/26/21 12/26/21    1320 1320 1025 1026   Glucose  91 87    BUN  10 8    Creatinine  0.61 0.84    eGFR Non African Am  118 81    Sodium  140 141    Potassium  4.0 3.7    Chloride  104 105    Calcium  9.4 9.2    Albumin  4.50 4.20    Total Bilirubin  0.3 0.3    Alkaline Phosphatase  75 88    AST (SGOT)  13 14    ALT (SGPT)  9 11    WBC 11.11 (A)   14.62 (A)   Hemoglobin 12.9   13.5   Hematocrit 38.6   40.3   Platelets 311   293   (A) Abnormal value                         ASSESSMENT & PLAN     Diagnoses and all orders for this visit:    1. Anxiety and depression (Primary)  -Patient reports long duration of worrying, going back to early adulthood in childhood.She was previously on prozac and zoloft. She states the zoloft caused really bad mood swings. She states the prozac " helped but then seemed to stop working. She has not been on either of those for several years.   -Currently, the patient is only taking hydroxyzine 25 mg as needed for panic attacks.  She is only using it once or twice monthly.  -In the office today, CAROLINA-7 is 21 indicating severe anxiety symptoms and her PHQ-9 is 12 indicating moderate depression symptoms.  -We discussed the need to add a mental health counselor or self-directed mental health program into her treatment regimen.  Advised patient that pharmacologic therapy is usually best when combined with cognitive therapy.  I provided her a listing of mental health counselors both in the community and online resources.  -Discussed reinitiation of pharmacologic therapy with Escitalopram 10 mg daily.  Discussed the common side effects including nausea that usually decreases after the first few weeks of therapy, decreased sexual drive, changes in mood, changes in weight.  She was agreeable to start.  Will begin Escitalopram 10 mg daily and have the patient follow-up in 4 weeks for a short-term reassessment.  -     TSH Rfx On Abnormal To Free T4  -     escitalopram (Lexapro) 10 MG tablet; Take 1 tablet by mouth Daily.  Dispense: 30 tablet; Refill: 1    2. Leukocytosis, unspecified type  -Review of chart back to 7 years ago shows that the patient has had a persistently elevated white blood cell count ranging from 11.11-14.62 at maximum which was 1 month ago when she was in the ER visit for suspected colitis.  This is typically neutrophilia so I suspect it has been associated with different infectious etiologies over the course of the last 7 years.  I will repeat BC today.  -     CBC w AUTO Differential    3. Hordeolum externum of left lower eyelid  -Patient reports redness and swelling on the lower lid of the left eye since November 2021, approximately 2 to 3 months.  She has been using over-the-counter stye remedies as well as a warm compress periodically.  -On exam  there is erythematous swelling on the medial to lateral aspect of the lower left lid consistent with a stye.  -Given the duration of illness, I would like for her to see a ophthalmologist for consideration of incision and drainage.  She can continue warm compresses at home I have asked her to increase the frequency to 5 times daily warm compress for 5 to 10 minutes at a time.  -     Ambulatory Referral to Ophthalmology    4. Need for hepatitis C screening test  -     Hepatitis C antibody            The following social determinates of health impact the patient's medical decision making: No social determinates of health were factored in to today's visit.     Follow Up  Return in about 4 weeks (around 3/3/2022) for Annual physical.    Patient/family had no further questions at this time and verbalized understanding of the plan discussed today.

## 2022-02-04 LAB
BASOPHILS # BLD AUTO: 0 X10E3/UL (ref 0–0.2)
BASOPHILS NFR BLD AUTO: 1 %
EOSINOPHIL # BLD AUTO: 0.1 X10E3/UL (ref 0–0.4)
EOSINOPHIL NFR BLD AUTO: 1 %
ERYTHROCYTE [DISTWIDTH] IN BLOOD BY AUTOMATED COUNT: 14 % (ref 11.7–15.4)
HCT VFR BLD AUTO: 39.3 % (ref 34–46.6)
HCV AB S/CO SERPL IA: <0.1 S/CO RATIO (ref 0–0.9)
HGB BLD-MCNC: 13 G/DL (ref 11.1–15.9)
IMM GRANULOCYTES # BLD AUTO: 0 X10E3/UL (ref 0–0.1)
IMM GRANULOCYTES NFR BLD AUTO: 0 %
LYMPHOCYTES # BLD AUTO: 2 X10E3/UL (ref 0.7–3.1)
LYMPHOCYTES NFR BLD AUTO: 30 %
MCH RBC QN AUTO: 27.2 PG (ref 26.6–33)
MCHC RBC AUTO-ENTMCNC: 33.1 G/DL (ref 31.5–35.7)
MCV RBC AUTO: 82 FL (ref 79–97)
MONOCYTES # BLD AUTO: 0.7 X10E3/UL (ref 0.1–0.9)
MONOCYTES NFR BLD AUTO: 10 %
NEUTROPHILS # BLD AUTO: 3.9 X10E3/UL (ref 1.4–7)
NEUTROPHILS NFR BLD AUTO: 58 %
PLATELET # BLD AUTO: 337 X10E3/UL (ref 150–450)
RBC # BLD AUTO: 4.78 X10E6/UL (ref 3.77–5.28)
TSH SERPL DL<=0.005 MIU/L-ACNC: 0.45 UIU/ML (ref 0.45–4.5)
WBC # BLD AUTO: 6.7 X10E3/UL (ref 3.4–10.8)

## 2022-02-10 ENCOUNTER — TRANSCRIBE ORDERS (OUTPATIENT)
Dept: ADMINISTRATIVE | Facility: HOSPITAL | Age: 27
End: 2022-02-10

## 2022-02-10 DIAGNOSIS — Z01.818 OTHER SPECIFIED PRE-OPERATIVE EXAMINATION: Primary | ICD-10-CM

## 2022-02-14 ENCOUNTER — TELEPHONE (OUTPATIENT)
Dept: GASTROENTEROLOGY | Facility: CLINIC | Age: 27
End: 2022-02-14

## 2022-02-14 DIAGNOSIS — Z01.818 PRE-OP TESTING: Primary | ICD-10-CM

## 2022-02-16 ENCOUNTER — LAB (OUTPATIENT)
Dept: LAB | Facility: HOSPITAL | Age: 27
End: 2022-02-16

## 2022-02-16 DIAGNOSIS — Z01.818 OTHER SPECIFIED PRE-OPERATIVE EXAMINATION: ICD-10-CM

## 2022-02-16 LAB — SARS-COV-2 ORF1AB RESP QL NAA+PROBE: DETECTED

## 2022-02-16 PROCEDURE — U0004 COV-19 TEST NON-CDC HGH THRU: HCPCS

## 2022-02-16 PROCEDURE — C9803 HOPD COVID-19 SPEC COLLECT: HCPCS

## 2022-02-17 ENCOUNTER — TELEPHONE (OUTPATIENT)
Dept: GASTROENTEROLOGY | Facility: CLINIC | Age: 27
End: 2022-02-17

## 2022-02-17 NOTE — TELEPHONE ENCOUNTER
Patient called. Advised her covid test was positive and will need to reschedule her test. She verb understanding.

## 2022-02-17 NOTE — TELEPHONE ENCOUNTER
----- Message from Aidan Ramirez sent at 2/17/2022  9:21 AM EST -----  Regarding: Call Back  Contact: [phone]  PT returned missed call. PT requesting a back: [phone]

## 2022-02-18 ENCOUNTER — PATIENT ROUNDING (BHMG ONLY) (OUTPATIENT)
Dept: INTERNAL MEDICINE | Facility: CLINIC | Age: 27
End: 2022-02-18

## 2022-02-18 NOTE — PROGRESS NOTES
February 18, 2022      Information gathered by Deisy during check out     I am  with TERESSA NAILS  [facility]  [address] [address] [phone].      Tell me about your visit with us.      Overall were you satisfied with your first visit to our practice? Yes       I appreciate you taking the time to speak with me today. Is there anything else I can do for you? No    Thank you, and have a great day.

## 2022-02-22 ENCOUNTER — TELEPHONE (OUTPATIENT)
Dept: GASTROENTEROLOGY | Facility: CLINIC | Age: 27
End: 2022-02-22

## 2022-03-01 ENCOUNTER — TELEPHONE (OUTPATIENT)
Dept: GASTROENTEROLOGY | Facility: CLINIC | Age: 27
End: 2022-03-01

## 2022-03-02 NOTE — TELEPHONE ENCOUNTER
Returned pt phone call. Pt states she needs to be overbooked and seen sooner than next available.

## 2022-03-04 ENCOUNTER — HOSPITAL ENCOUNTER (OUTPATIENT)
Dept: NUCLEAR MEDICINE | Facility: HOSPITAL | Age: 27
Discharge: HOME | End: 2022-03-04

## 2022-03-04 ENCOUNTER — HOSPITAL ENCOUNTER (OUTPATIENT)
Dept: ULTRASOUND IMAGING | Facility: HOSPITAL | Age: 27
Discharge: HOME | End: 2022-03-04
Admitting: STUDENT IN AN ORGANIZED HEALTH CARE EDUCATION/TRAINING PROGRAM

## 2022-03-04 DIAGNOSIS — R10.84 GENERALIZED ABDOMINAL PAIN: ICD-10-CM

## 2022-03-04 PROCEDURE — A9537 TC99M MEBROFENIN: HCPCS | Performed by: STUDENT IN AN ORGANIZED HEALTH CARE EDUCATION/TRAINING PROGRAM

## 2022-03-04 PROCEDURE — 0 TECHNETIUM TC 99M MEBROFENIN KIT: Performed by: STUDENT IN AN ORGANIZED HEALTH CARE EDUCATION/TRAINING PROGRAM

## 2022-03-04 PROCEDURE — 76705 ECHO EXAM OF ABDOMEN: CPT

## 2022-03-04 PROCEDURE — 78226 HEPATOBILIARY SYSTEM IMAGING: CPT

## 2022-03-04 RX ORDER — KIT FOR THE PREPARATION OF TECHNETIUM TC 99M MEBROFENIN 45 MG/10ML
1 INJECTION, POWDER, LYOPHILIZED, FOR SOLUTION INTRAVENOUS
Status: COMPLETED | OUTPATIENT
Start: 2022-03-04 | End: 2022-03-04

## 2022-03-04 RX ADMIN — MEBROFENIN 1 DOSE: 45 INJECTION, POWDER, LYOPHILIZED, FOR SOLUTION INTRAVENOUS at 10:02

## 2022-03-07 ENCOUNTER — TELEPHONE (OUTPATIENT)
Dept: SURGERY | Facility: CLINIC | Age: 27
End: 2022-03-07

## 2022-03-07 ENCOUNTER — PREP FOR SURGERY (OUTPATIENT)
Dept: OTHER | Facility: HOSPITAL | Age: 27
End: 2022-03-07

## 2022-03-07 DIAGNOSIS — K82.4 GALLBLADDER POLYP: Primary | ICD-10-CM

## 2022-03-07 DIAGNOSIS — F32.A ANXIETY AND DEPRESSION: ICD-10-CM

## 2022-03-07 DIAGNOSIS — F41.9 ANXIETY AND DEPRESSION: ICD-10-CM

## 2022-03-07 RX ORDER — CEFAZOLIN SODIUM 2 G/100ML
2 INJECTION, SOLUTION INTRAVENOUS ONCE
Status: CANCELLED | OUTPATIENT
Start: 2022-03-10 | End: 2022-03-07

## 2022-03-07 RX ORDER — ESCITALOPRAM OXALATE 10 MG/1
10 TABLET ORAL DAILY
Qty: 30 TABLET | Refills: 1 | OUTPATIENT
Start: 2022-03-07

## 2022-03-08 ENCOUNTER — LAB (OUTPATIENT)
Dept: LAB | Facility: HOSPITAL | Age: 27
End: 2022-03-08

## 2022-03-08 DIAGNOSIS — K82.4 GALLBLADDER POLYP: ICD-10-CM

## 2022-03-08 LAB — SARS-COV-2 ORF1AB RESP QL NAA+PROBE: NOT DETECTED

## 2022-03-08 PROCEDURE — U0004 COV-19 TEST NON-CDC HGH THRU: HCPCS

## 2022-03-08 PROCEDURE — C9803 HOPD COVID-19 SPEC COLLECT: HCPCS

## 2022-03-09 DIAGNOSIS — F41.9 ANXIETY AND DEPRESSION: ICD-10-CM

## 2022-03-09 DIAGNOSIS — F32.A ANXIETY AND DEPRESSION: ICD-10-CM

## 2022-03-09 RX ORDER — ESCITALOPRAM OXALATE 10 MG/1
10 TABLET ORAL DAILY
Qty: 30 TABLET | Refills: 0 | Status: SHIPPED | OUTPATIENT
Start: 2022-03-09 | End: 2022-05-12

## 2022-03-10 ENCOUNTER — ANESTHESIA (OUTPATIENT)
Dept: PERIOP | Facility: HOSPITAL | Age: 27
End: 2022-03-10

## 2022-03-10 ENCOUNTER — HOSPITAL ENCOUNTER (OUTPATIENT)
Facility: HOSPITAL | Age: 27
Setting detail: HOSPITAL OUTPATIENT SURGERY
Discharge: HOME | End: 2022-03-10
Attending: STUDENT IN AN ORGANIZED HEALTH CARE EDUCATION/TRAINING PROGRAM | Admitting: STUDENT IN AN ORGANIZED HEALTH CARE EDUCATION/TRAINING PROGRAM

## 2022-03-10 ENCOUNTER — ANESTHESIA EVENT (OUTPATIENT)
Dept: PERIOP | Facility: HOSPITAL | Age: 27
End: 2022-03-10

## 2022-03-10 ENCOUNTER — APPOINTMENT (OUTPATIENT)
Dept: GENERAL RADIOLOGY | Facility: HOSPITAL | Age: 27
End: 2022-03-10

## 2022-03-10 VITALS
SYSTOLIC BLOOD PRESSURE: 109 MMHG | DIASTOLIC BLOOD PRESSURE: 62 MMHG | HEART RATE: 85 BPM | OXYGEN SATURATION: 100 % | WEIGHT: 159.61 LBS | BODY MASS INDEX: 25.78 KG/M2 | RESPIRATION RATE: 16 BRPM | TEMPERATURE: 97.2 F

## 2022-03-10 DIAGNOSIS — K82.4 GALLBLADDER POLYP: Primary | ICD-10-CM

## 2022-03-10 LAB
B-HCG UR QL: NEGATIVE
EXPIRATION DATE: NORMAL
INTERNAL NEGATIVE CONTROL: NORMAL
INTERNAL POSITIVE CONTROL: NORMAL
Lab: NORMAL

## 2022-03-10 PROCEDURE — 25010000002 CEFAZOLIN IN DEXTROSE 2-4 GM/100ML-% SOLUTION: Performed by: STUDENT IN AN ORGANIZED HEALTH CARE EDUCATION/TRAINING PROGRAM

## 2022-03-10 PROCEDURE — 25010000002 HYDROMORPHONE 1 MG/ML SOLUTION: Performed by: ANESTHESIOLOGY

## 2022-03-10 PROCEDURE — 25010000002 HYDROMORPHONE PER 4 MG: Performed by: ANESTHESIOLOGY

## 2022-03-10 PROCEDURE — 25010000002 FENTANYL CITRATE (PF) 50 MCG/ML SOLUTION: Performed by: ANESTHESIOLOGY

## 2022-03-10 PROCEDURE — 0 IOTHALAMATE 60 % SOLUTION: Performed by: STUDENT IN AN ORGANIZED HEALTH CARE EDUCATION/TRAINING PROGRAM

## 2022-03-10 PROCEDURE — 25010000002 DEXAMETHASONE PER 1 MG: Performed by: NURSE ANESTHETIST, CERTIFIED REGISTERED

## 2022-03-10 PROCEDURE — 25010000002 MIDAZOLAM PER 1 MG: Performed by: ANESTHESIOLOGY

## 2022-03-10 PROCEDURE — 25010000002 KETOROLAC TROMETHAMINE PER 15 MG: Performed by: NURSE ANESTHETIST, CERTIFIED REGISTERED

## 2022-03-10 PROCEDURE — 47563 LAPARO CHOLECYSTECTOMY/GRAPH: CPT | Performed by: STUDENT IN AN ORGANIZED HEALTH CARE EDUCATION/TRAINING PROGRAM

## 2022-03-10 PROCEDURE — 74300 X-RAY BILE DUCTS/PANCREAS: CPT

## 2022-03-10 PROCEDURE — C1889 IMPLANT/INSERT DEVICE, NOC: HCPCS | Performed by: STUDENT IN AN ORGANIZED HEALTH CARE EDUCATION/TRAINING PROGRAM

## 2022-03-10 PROCEDURE — 25010000002 ONDANSETRON PER 1 MG: Performed by: ANESTHESIOLOGY

## 2022-03-10 PROCEDURE — 25010000002 PROPOFOL 10 MG/ML EMULSION: Performed by: NURSE ANESTHETIST, CERTIFIED REGISTERED

## 2022-03-10 PROCEDURE — 47563 LAPARO CHOLECYSTECTOMY/GRAPH: CPT | Performed by: REGISTERED NURSE

## 2022-03-10 PROCEDURE — 25010000002 FENTANYL CITRATE (PF) 50 MCG/ML SOLUTION: Performed by: NURSE ANESTHETIST, CERTIFIED REGISTERED

## 2022-03-10 PROCEDURE — 81025 URINE PREGNANCY TEST: CPT | Performed by: ANESTHESIOLOGY

## 2022-03-10 PROCEDURE — 25010000002 ONDANSETRON PER 1 MG: Performed by: NURSE ANESTHETIST, CERTIFIED REGISTERED

## 2022-03-10 PROCEDURE — 88304 TISSUE EXAM BY PATHOLOGIST: CPT | Performed by: STUDENT IN AN ORGANIZED HEALTH CARE EDUCATION/TRAINING PROGRAM

## 2022-03-10 PROCEDURE — 25010000002 HYDROMORPHONE PER 4 MG: Performed by: NURSE ANESTHETIST, CERTIFIED REGISTERED

## 2022-03-10 DEVICE — CLIP LIGAT VASC HORIZON TI MD/LG GRN 6CT: Type: IMPLANTABLE DEVICE | Site: ABDOMEN | Status: FUNCTIONAL

## 2022-03-10 RX ORDER — MAGNESIUM HYDROXIDE 1200 MG/15ML
LIQUID ORAL AS NEEDED
Status: DISCONTINUED | OUTPATIENT
Start: 2022-03-10 | End: 2022-03-10 | Stop reason: HOSPADM

## 2022-03-10 RX ORDER — DEXAMETHASONE SODIUM PHOSPHATE 10 MG/ML
INJECTION INTRAMUSCULAR; INTRAVENOUS AS NEEDED
Status: DISCONTINUED | OUTPATIENT
Start: 2022-03-10 | End: 2022-03-10 | Stop reason: SURG

## 2022-03-10 RX ORDER — SODIUM CHLORIDE 0.9 % (FLUSH) 0.9 %
10 SYRINGE (ML) INJECTION EVERY 12 HOURS SCHEDULED
Status: DISCONTINUED | OUTPATIENT
Start: 2022-03-10 | End: 2022-03-10 | Stop reason: HOSPADM

## 2022-03-10 RX ORDER — SODIUM CHLORIDE, SODIUM LACTATE, POTASSIUM CHLORIDE, CALCIUM CHLORIDE 600; 310; 30; 20 MG/100ML; MG/100ML; MG/100ML; MG/100ML
9 INJECTION, SOLUTION INTRAVENOUS CONTINUOUS
Status: DISCONTINUED | OUTPATIENT
Start: 2022-03-10 | End: 2022-03-10 | Stop reason: HOSPADM

## 2022-03-10 RX ORDER — OXYCODONE HYDROCHLORIDE AND ACETAMINOPHEN 5; 325 MG/1; MG/1
1 TABLET ORAL ONCE AS NEEDED
Status: COMPLETED | OUTPATIENT
Start: 2022-03-10 | End: 2022-03-10

## 2022-03-10 RX ORDER — PROPOFOL 10 MG/ML
VIAL (ML) INTRAVENOUS AS NEEDED
Status: DISCONTINUED | OUTPATIENT
Start: 2022-03-10 | End: 2022-03-10 | Stop reason: SURG

## 2022-03-10 RX ORDER — LIDOCAINE HYDROCHLORIDE 20 MG/ML
INJECTION, SOLUTION INFILTRATION; PERINEURAL AS NEEDED
Status: DISCONTINUED | OUTPATIENT
Start: 2022-03-10 | End: 2022-03-10 | Stop reason: SURG

## 2022-03-10 RX ORDER — DIPHENHYDRAMINE HYDROCHLORIDE 50 MG/ML
12.5 INJECTION INTRAMUSCULAR; INTRAVENOUS
Status: DISCONTINUED | OUTPATIENT
Start: 2022-03-10 | End: 2022-03-10 | Stop reason: HOSPADM

## 2022-03-10 RX ORDER — EPHEDRINE SULFATE 50 MG/ML
INJECTION, SOLUTION INTRAVENOUS AS NEEDED
Status: DISCONTINUED | OUTPATIENT
Start: 2022-03-10 | End: 2022-03-10 | Stop reason: SURG

## 2022-03-10 RX ORDER — MIDAZOLAM HYDROCHLORIDE 1 MG/ML
1 INJECTION INTRAMUSCULAR; INTRAVENOUS
Status: COMPLETED | OUTPATIENT
Start: 2022-03-10 | End: 2022-03-10

## 2022-03-10 RX ORDER — CEFAZOLIN SODIUM 2 G/100ML
2 INJECTION, SOLUTION INTRAVENOUS ONCE
Status: COMPLETED | OUTPATIENT
Start: 2022-03-10 | End: 2022-03-10

## 2022-03-10 RX ORDER — NALOXONE HCL 0.4 MG/ML
0.4 VIAL (ML) INJECTION AS NEEDED
Status: DISCONTINUED | OUTPATIENT
Start: 2022-03-10 | End: 2022-03-10 | Stop reason: HOSPADM

## 2022-03-10 RX ORDER — ONDANSETRON 2 MG/ML
4 INJECTION INTRAMUSCULAR; INTRAVENOUS ONCE AS NEEDED
Status: COMPLETED | OUTPATIENT
Start: 2022-03-10 | End: 2022-03-10

## 2022-03-10 RX ORDER — ONDANSETRON 2 MG/ML
INJECTION INTRAMUSCULAR; INTRAVENOUS AS NEEDED
Status: DISCONTINUED | OUTPATIENT
Start: 2022-03-10 | End: 2022-03-10 | Stop reason: SURG

## 2022-03-10 RX ORDER — FENTANYL CITRATE 50 UG/ML
INJECTION, SOLUTION INTRAMUSCULAR; INTRAVENOUS AS NEEDED
Status: DISCONTINUED | OUTPATIENT
Start: 2022-03-10 | End: 2022-03-10 | Stop reason: SURG

## 2022-03-10 RX ORDER — TRAMADOL HYDROCHLORIDE 50 MG/1
50 TABLET ORAL EVERY 6 HOURS PRN
Qty: 15 TABLET | Refills: 0 | Status: SHIPPED | OUTPATIENT
Start: 2022-03-10 | End: 2022-06-24

## 2022-03-10 RX ORDER — HYDRALAZINE HYDROCHLORIDE 20 MG/ML
5 INJECTION INTRAMUSCULAR; INTRAVENOUS
Status: DISCONTINUED | OUTPATIENT
Start: 2022-03-10 | End: 2022-03-10 | Stop reason: HOSPADM

## 2022-03-10 RX ORDER — ACETAMINOPHEN 500 MG
500 TABLET ORAL ONCE
Status: COMPLETED | OUTPATIENT
Start: 2022-03-10 | End: 2022-03-10

## 2022-03-10 RX ORDER — HYDROMORPHONE HCL 110MG/55ML
PATIENT CONTROLLED ANALGESIA SYRINGE INTRAVENOUS AS NEEDED
Status: DISCONTINUED | OUTPATIENT
Start: 2022-03-10 | End: 2022-03-10 | Stop reason: SURG

## 2022-03-10 RX ORDER — HYDROMORPHONE HYDROCHLORIDE 1 MG/ML
0.25 INJECTION, SOLUTION INTRAMUSCULAR; INTRAVENOUS; SUBCUTANEOUS
Status: DISCONTINUED | OUTPATIENT
Start: 2022-03-10 | End: 2022-03-10 | Stop reason: HOSPADM

## 2022-03-10 RX ORDER — KETOROLAC TROMETHAMINE 30 MG/ML
INJECTION, SOLUTION INTRAMUSCULAR; INTRAVENOUS AS NEEDED
Status: DISCONTINUED | OUTPATIENT
Start: 2022-03-10 | End: 2022-03-10 | Stop reason: SURG

## 2022-03-10 RX ORDER — PROMETHAZINE HYDROCHLORIDE 25 MG/1
25 TABLET ORAL ONCE AS NEEDED
Status: DISCONTINUED | OUTPATIENT
Start: 2022-03-10 | End: 2022-03-10 | Stop reason: HOSPADM

## 2022-03-10 RX ORDER — LIDOCAINE HYDROCHLORIDE 10 MG/ML
0.5 INJECTION, SOLUTION EPIDURAL; INFILTRATION; INTRACAUDAL; PERINEURAL ONCE AS NEEDED
Status: DISCONTINUED | OUTPATIENT
Start: 2022-03-10 | End: 2022-03-10 | Stop reason: HOSPADM

## 2022-03-10 RX ORDER — BUPIVACAINE HYDROCHLORIDE AND EPINEPHRINE 5; 5 MG/ML; UG/ML
INJECTION, SOLUTION EPIDURAL; INTRACAUDAL; PERINEURAL AS NEEDED
Status: DISCONTINUED | OUTPATIENT
Start: 2022-03-10 | End: 2022-03-10 | Stop reason: HOSPADM

## 2022-03-10 RX ORDER — FENTANYL CITRATE 50 UG/ML
50 INJECTION, SOLUTION INTRAMUSCULAR; INTRAVENOUS
Status: DISCONTINUED | OUTPATIENT
Start: 2022-03-10 | End: 2022-03-10 | Stop reason: HOSPADM

## 2022-03-10 RX ORDER — SODIUM CHLORIDE 9 MG/ML
INJECTION, SOLUTION INTRAVENOUS AS NEEDED
Status: DISCONTINUED | OUTPATIENT
Start: 2022-03-10 | End: 2022-03-10 | Stop reason: HOSPADM

## 2022-03-10 RX ORDER — ROCURONIUM BROMIDE 10 MG/ML
INJECTION, SOLUTION INTRAVENOUS AS NEEDED
Status: DISCONTINUED | OUTPATIENT
Start: 2022-03-10 | End: 2022-03-10 | Stop reason: SURG

## 2022-03-10 RX ORDER — SODIUM CHLORIDE 0.9 % (FLUSH) 0.9 %
10 SYRINGE (ML) INJECTION AS NEEDED
Status: DISCONTINUED | OUTPATIENT
Start: 2022-03-10 | End: 2022-03-10 | Stop reason: HOSPADM

## 2022-03-10 RX ORDER — ONDANSETRON 4 MG/1
4 TABLET, FILM COATED ORAL DAILY PRN
Qty: 15 TABLET | Refills: 0 | Status: SHIPPED | OUTPATIENT
Start: 2022-03-10 | End: 2023-03-10

## 2022-03-10 RX ADMIN — OXYCODONE AND ACETAMINOPHEN 1 TABLET: 5; 325 TABLET ORAL at 12:23

## 2022-03-10 RX ADMIN — LIDOCAINE HYDROCHLORIDE 80 MG: 20 INJECTION, SOLUTION INFILTRATION; PERINEURAL at 10:53

## 2022-03-10 RX ADMIN — ONDANSETRON 4 MG: 2 INJECTION INTRAMUSCULAR; INTRAVENOUS at 11:08

## 2022-03-10 RX ADMIN — HYDROMORPHONE HYDROCHLORIDE 0.25 MG: 10 INJECTION INTRAMUSCULAR; INTRAVENOUS; SUBCUTANEOUS at 11:54

## 2022-03-10 RX ADMIN — HYDROMORPHONE HYDROCHLORIDE 0.25 MG: 10 INJECTION INTRAMUSCULAR; INTRAVENOUS; SUBCUTANEOUS at 12:00

## 2022-03-10 RX ADMIN — ONDANSETRON 4 MG: 2 INJECTION INTRAMUSCULAR; INTRAVENOUS at 12:05

## 2022-03-10 RX ADMIN — EPHEDRINE SULFATE 10 MG: 50 INJECTION INTRAVENOUS at 11:01

## 2022-03-10 RX ADMIN — PROPOFOL 200 MG: 10 INJECTION, EMULSION INTRAVENOUS at 10:53

## 2022-03-10 RX ADMIN — FENTANYL CITRATE 50 MCG: 50 INJECTION INTRAMUSCULAR; INTRAVENOUS at 11:54

## 2022-03-10 RX ADMIN — ROCURONIUM BROMIDE 30 MG: 50 INJECTION INTRAVENOUS at 10:53

## 2022-03-10 RX ADMIN — ACETAMINOPHEN 500 MG: 500 TABLET ORAL at 09:42

## 2022-03-10 RX ADMIN — MIDAZOLAM 1 MG: 1 INJECTION INTRAMUSCULAR; INTRAVENOUS at 09:44

## 2022-03-10 RX ADMIN — FENTANYL CITRATE 50 MCG: 50 INJECTION INTRAMUSCULAR; INTRAVENOUS at 10:53

## 2022-03-10 RX ADMIN — DEXAMETHASONE SODIUM PHOSPHATE 8 MG: 10 INJECTION INTRAMUSCULAR; INTRAVENOUS at 11:02

## 2022-03-10 RX ADMIN — EPHEDRINE SULFATE 10 MG: 50 INJECTION INTRAVENOUS at 11:05

## 2022-03-10 RX ADMIN — HYDROMORPHONE HYDROCHLORIDE 0.5 MG: 2 INJECTION, SOLUTION INTRAMUSCULAR; INTRAVENOUS; SUBCUTANEOUS at 11:45

## 2022-03-10 RX ADMIN — MIDAZOLAM 1 MG: 1 INJECTION INTRAMUSCULAR; INTRAVENOUS at 10:31

## 2022-03-10 RX ADMIN — CEFAZOLIN SODIUM 2 G: 2 INJECTION, SOLUTION INTRAVENOUS at 10:32

## 2022-03-10 RX ADMIN — FENTANYL CITRATE 50 MCG: 50 INJECTION INTRAMUSCULAR; INTRAVENOUS at 11:07

## 2022-03-10 RX ADMIN — HYDROMORPHONE HYDROCHLORIDE 0.5 MG: 2 INJECTION, SOLUTION INTRAMUSCULAR; INTRAVENOUS; SUBCUTANEOUS at 11:39

## 2022-03-10 RX ADMIN — SODIUM CHLORIDE, POTASSIUM CHLORIDE, SODIUM LACTATE AND CALCIUM CHLORIDE 9 ML/HR: 600; 310; 30; 20 INJECTION, SOLUTION INTRAVENOUS at 09:46

## 2022-03-10 RX ADMIN — KETOROLAC TROMETHAMINE 30 MG: 30 INJECTION, SOLUTION INTRAMUSCULAR at 11:08

## 2022-03-10 NOTE — ANESTHESIA PROCEDURE NOTES
Airway  Urgency: elective    Date/Time: 3/10/2022 10:54 AM  Airway not difficult    General Information and Staff    Patient location during procedure: OR  CRNA: Emeli Vela CRNA    Indications and Patient Condition  Indications for airway management: airway protection    Preoxygenated: yes  Mask difficulty assessment: 2 - vent by mask + OA or adjuvant +/- NMBA    Final Airway Details  Final airway type: endotracheal airway      Successful airway: ETT  Cuffed: yes   Successful intubation technique: direct laryngoscopy  Endotracheal tube insertion site: oral  Blade: Mtz  Blade size: 2  ETT size (mm): 7.0  Cormack-Lehane Classification: grade I - full view of glottis  Placement verified by: chest auscultation and capnometry   Cuff volume (mL): 8  Number of attempts at approach: 1  Assessment: lips, teeth, and gum same as pre-op and atraumatic intubation    Additional Comments  PreO2 with 100% O2;  FeO2 >85%;  sniff position; easy mask ventilation;  Intubated with no difficultly after eyes taped; Appears atraumatic;  Lips and teeth intact as preop condition;  Airway secured. Connected to ventilator.

## 2022-03-10 NOTE — ANESTHESIA POSTPROCEDURE EVALUATION
Patient: Taty Chacko    Procedure Summary     Date: 03/10/22 Room / Location:  [facility]  [facility]    Anesthesia Start: 1044 Anesthesia Stop: 1151    Procedure: Laparoscopic cholecystectomy with cholangiogram (N/A Abdomen) Diagnosis:       Gallbladder polyp      (Gallbladder polyp [K82.4])    Surgeons: Bushra Loyola MD Provider: Ethan Patel MD    Anesthesia Type: general ASA Status: 1          Anesthesia Type: general    Vitals  Vitals Value Taken Time   /56 03/10/22 1231   Temp 36.2 °C (97.2 °F) 03/10/22 1237   Pulse 70 03/10/22 1238   Resp 18 03/10/22 1230   SpO2 100 % 03/10/22 1238   Vitals shown include unvalidated device data.        Post Anesthesia Care and Evaluation    Patient location during evaluation: bedside  Patient participation: complete - patient participated  Level of consciousness: awake  Pain management: adequate  Airway patency: patent  Anesthetic complications: No anesthetic complications  PONV Status: controlled  Cardiovascular status: acceptable  Respiratory status: acceptable  Hydration status: acceptable    Comments: --------------------            03/10/22               1243     --------------------   BP:       109/62     Pulse:      85       Resp:       16       Temp:                SpO2:      100%     --------------------

## 2022-03-10 NOTE — H&P
Update: HIDA normal, US with gallbladder polyps. Discussed serial imaging verus cholecystectomy. Due to her symptoms, we will proceed with laparoscopic cholecystectomy with cholangiogram. Risks, benefits and alternatives were explained to the patient who agreed and wished to proceed.     General Surgery History and Physical       Summary:    Mrs. Taty Chacko is a 26 y.o. lady with abdominal pain, nausea, vomiting, and weight loss.  Scheduled for bidirectional endoscopy.  Will obtain right upper quadrant ultrasound and HIDA scan to further delineate the etiology of her abdominal pain.  I will call her with these results.     Referring Provider: No ref. provider found     Chief Complaint:    Abdominal pain     History of Present Illness:    Mrs. Taty Chacko is a 26 y.o. lady who presents to the office with severe heartburn and vomiting that began in November.  She went to the ER at this time and was sent home on Carafate and PPI.  She said this helped intermittently but is still having significant problems.  In December she was started on amoxicillin after a dog bite at her work.  She then developed severe vomiting and diarrhea.  She is lost 20 pounds due to her diarrhea and inability to tolerate p.o.  She returned to the ER and CT scan was overall unremarkable.  She is still having significant problems especially after eating.     Past Medical History:   · None     Past Surgical History:    · R knee arthroscopy     Family History:    · No family history of colon cancer     Social History:    · Denies tobacco use  · Occasional alcohol use     Allergies:        Allergies   Allergen Reactions   • Hydrocodone Itching and Rash         Medications:      Current Outpatient Medications:   •  Diclofenac Sodium (VOLTAREN) 1 % gel gel, Apply 4 g topically to the appropriate area as directed 4 (Four) Times a Day As Needed (muscle pain)., Disp: 100 g, Rfl: 0  •  hydrOXYzine (ATARAX) 25 MG tablet, Take 25-50 mg by mouth  Every 6 (Six) Hours As Needed., Disp: , Rfl:   •  levonorgestrel (KYLEENA) 19.5 MG intrauterine device IUD, 1 each by Intrauterine route 1 (One) Time., Disp: , Rfl:   •  metroNIDAZOLE (FLAGYL) 500 MG tablet, Take 1 tablet by mouth 3 (Three) Times a Day., Disp: 30 tablet, Rfl: 0  •  minocycline (MINOCIN,DYNACIN) 50 MG capsule, Take 50 mg by mouth 2 (Two) Times a Day., Disp: , Rfl: 5  •  Multiple Vitamins-Minerals (MULTIVITAMIN ADULT PO), Take 1 tablet by mouth Daily., Disp: , Rfl:   •  pantoprazole (PROTONIX) 40 MG EC tablet, Take 1 tablet by mouth Daily., Disp: 15 tablet, Rfl: 0  •  promethazine (PHENERGAN) 25 MG tablet, Take 1 tablet by mouth Every 6 (Six) Hours As Needed for Nausea or Vomiting., Disp: 10 tablet, Rfl: 0  •  topiramate (TOPAMAX) 50 MG tablet, Take 1 tablet by mouth 2 (Two) Times a Day., Disp: 60 tablet, Rfl: 3     Radiology/Endoscopy:    · 12/26/2021 CT abdomen/pelvis reviewed; thickening of colon extending into the rectosigmoid colon      Labs:    · Labs from 12/26/2021 reviewed     Review of Systems:   Influenza-like illness: no fever, no  cough, no  sore throat, no  body aches, no loss of sense of taste or smell, no known exposure to person with Covid-19.  Constitutional: Negative for fevers or chills  HENT: Negative for hearing loss or runny nose  Eyes: Negative for vision changes or scleral icterus  Respiratory: Negative for cough or shortness of breath  Cardiovascular: Negative for chest pain or heart palpitations  Gastrointestinal: + for abdominal pain, nausea, vomiting; Negative for constipation, melena, or hematochezia  Genitourinary: Negative for hematuria or dysuria  Musculoskeletal: Negative for joint swelling or gait instability  Neurologic: Negative for tremors or seizures  Psychiatric: Negative for suicidal ideations or depression  All other systems reviewed and negative     Physical Exam:   · Constitutional: Well-developed well-nourished, no acute distress  · Eyes: Conjunctiva  normal, sclera nonicteric  · ENMT: Hearing grossly normal, oral mucosa moist  · Neck: Supple, no palpable mass, trachea midline  · Respiratory: No increased work of breathing, normal inspiratory effort  · Cardiovascular: Regular rate, no peripheral edema, no jugular venous distention  · Gastrointestinal: Soft, nontender  · Skin:  Warm, dry, no rash on visualized skin surfaces  · Musculoskeletal: Symmetric strength, normal gait  · Psychiatric: Alert and oriented ×3, normal affect         Bushra Loyola M.D.  General and Endoscopic Surgery  [facility]     [address] [place] [address], [address]  [place], [place], [facility]  P: [phone]  F: [phone]

## 2022-03-10 NOTE — ANESTHESIA PREPROCEDURE EVALUATION
Anesthesia Evaluation     no history of anesthetic complications:  NPO Solid Status: > 8 hours             Airway   Mallampati: I  TM distance: >3 FB  Neck ROM: full  No difficulty expected  Dental - normal exam     Pulmonary - negative pulmonary ROS and normal exam   Cardiovascular - negative cardio ROS and normal exam        Neuro/Psych  GI/Hepatic/Renal/Endo    (+)  GERD,  renal disease stones,     Musculoskeletal     Abdominal    Substance History      OB/GYN          Other                        Anesthesia Plan    ASA 1     general   (  D/W R&B of GA including but not limited to: heart, lung, liver, kidney, neurologic problems, positioning injuries, dental damage, corneal abrasion and TMJ.  .)  intravenous induction     Anesthetic plan, all risks, benefits, and alternatives have been provided, discussed and informed consent has been obtained with: patient.        CODE STATUS:

## 2022-03-10 NOTE — OP NOTE
OPERATIVE REPORT     DATE OF OPERATION: 3/10/2022    SURGEON:   Bushra Loyola MD    ASSISTANT:  Jaylin Roberts, who was present for necessary retraction, suctioning, camera holding, and suturing throughout the procedure     PREOPERATIVE DIAGNOSIS: Gallbladder polyps    POSTOPERATIVE DIAGNOSIS: Same    PROCEDURE PERFORMED: Laparoscopic cholecystectomy with intraoperative cholangiogram    ANESTHESIA: General    SPECIMEN: Gallbladder    DRAINS: None    BLOOD LOSS: Minimal    INDICATIONS FOR OPERATION: Mrs. Taty Chacko is a 27 y.o. year old lady who has had abdominal pain, nausea, and vomiting for the last several months.  Work-up has been unrevealing so far.  She did recently have an ultrasound that showed polyps of the gallbladder.  Due to her symptoms, we discussed, laparoscopic cholecystectomy with cholangiogram. All risks (including bleeding, infection, damage to surrounding structures including the bile duct), benefits, and alternatives were explained to the patient and she agreed and wished to proceed.  Informed consent was obtained.    OPERATIVE REPORT: The patient was taken to the operating room, transferred onto the operating room table, and underwent general endotracheal anesthesia without incident. The patient was prepped and draped in the usual sterile fashion.  Preoperative antibiotics were given, and a timeout was performed.  Half percent Marcaine with epinephrine was and injected into the skin and subcutaneous tissues.  A curvilinear incision was made just inferior to the umbilicus.  The tissues were dissected down to the level of the fascia that was entered under direct visualization.  0 Vicryl stay stitches were placed.  A Carpio trocar was inserted into the abdomen and it was insufflated.  No injuries were noted from insertion.  A mid epigastric 11 mm trocar was placed into right upper quadrant 5 mm trochars were placed.  There was some mild inflammation of omentum to the gallbladder.  This was  taken down bluntly and with Bovie electrocautery. The gallbladder was retracted cranially and laterally to allow for adequate visualization.  The area around the infundibulum was dissected until the cystic duct and artery were identified. The critical view was obtained. A cholangiogram was done by placing a clip on the cystic duct and incising it just distal to this.  A cholangiogram catheter was introduced with an Angiocath needle and directed into the cystic duct.  A clip was applied.  With fluoroscopy contrast was injected and confirmed the anatomy and that there were no stones present.  Contrast was seen going into the right and left hepatic ducts as well as the duodenum.  The cholangiogram catheter was then removed.  The cystic duct had 2 clips placed on the bile duct side and was transected.  The same was done cystic artery.  Bovie electrocautery was then used to remove the gallbladder from the liver bed.  The gallbladder was placed into a bag.  The area was then irrigated and inspected for hemostasis.  The liver bed was cauterized with Bovie electrocautery for small bleeding areas.  There was then no bleeding or bile noted.  The gallbladder was then removed through the subxiphoid port.  The ports were removed under direct visualization.  The fascia of the subxiphoid and periumbilical port were closed with a 0 Vicryl suture.  The incisions were then closed with 4-0 Vicryl sutures and Dermabond.  All needle and lap counts were correct at the end of the case.  Was awoken from general endotracheal anesthesia and taken to the recovery area for further monitoring.    TIMUR MCLAUGHLIN M.D.  General and Endoscopic Surgery  [facility]    [address] [place] [address], [address]  [place], [place], [address]  P: [phone]  F: [phone]

## 2022-03-11 LAB
LAB AP CASE REPORT: NORMAL
PATH REPORT.FINAL DX SPEC: NORMAL
PATH REPORT.GROSS SPEC: NORMAL

## 2022-03-16 ENCOUNTER — TELEPHONE (OUTPATIENT)
Dept: SURGERY | Facility: CLINIC | Age: 27
End: 2022-03-16

## 2022-03-16 NOTE — TELEPHONE ENCOUNTER
The following listed below is from the patient msg from [facility] today 03/16/2022    I had a cholecystectomy last Thursday and I have generally been doing well post op until today. I woke up with heartburn and have been vomiting off and on this afternoon. Is this normal? Do I need to recheck sooner than next week?  I will admit that I went back to work yesterday on light duty. Am I just overdoing it too soon?  Thanks  Taty        She can be reached at the number listed in her chart         GENERAL SURGERY    Called Mrs. Chacko regarding her new abdominal pain. She did well since surgery until today. This morning she had some issues with reflux. She also developed abdominal cramping and vomiting. She took zofran and a nap. She feels better now, although not all the way back to normal. We discussed addressing constipation and sticking to a bland diet. If her symptoms worsen, she will present to the ER or call in the am for labs.     Bushra Loyola MD

## 2022-03-22 NOTE — PROGRESS NOTES
General Surgery Post-Operative Follow Up Note     Summary:    Taty Chacko is a 27 y.o. year old. The patient presents for post-operative follow up from laparoscopic cholecystectomy with cholangiogram.  She is doing well from surgery with no acute issues.  Symptoms are improving.  She does plan to do a colonoscopy if her symptoms are not completely improved.    History of Present Illness:    Taty Chacko is a 27 y.o. year old. The patient presents  for post-operative follow up.  She is overall doing well.  Her pain is slowly improving.  She has some heartburn but overall seems to be better.  She does complain of muscle soreness.    Procedure:    • 3/10/2022 Laparoscopic cholecystectomy with intraoperative cholangiogram    Pathology:    Final Diagnosis   1. Gallbladder, Cholecystectomy:  Benign gallbladder with               A. Cholelithiasis.               B. Mild chronic cholecystitis.     Physical Exam:   • Constitutional: Well-developed well-nourished, no acute distress  • Eyes: Conjunctiva normal, sclera nonicteric  • ENMT: Hearing grossly normal, oral mucosa moist  • Respiratory: No increased work of breathing, normal inspiratory effort  • Cardiovascular: Regular rate, no peripheral edema, no jugular venous distention  • Gastrointestinal: Soft, nontender, Siddon sites clean and dry with no erythema or drainage, no signs of infection  • Skin:  Warm, dry, no rash on visualized skin surfaces  • Musculoskeletal: Symmetric strength, normal gait  • Psychiatric: Alert and oriented ×3, normal affect       Bushra Loyola M.D.  General and Endoscopic Surgery  [facility]    [address], [address]  [place], [place], [address]  P: [phone]  F: [phone]

## 2022-03-23 ENCOUNTER — OFFICE VISIT (OUTPATIENT)
Dept: SURGERY | Facility: CLINIC | Age: 27
End: 2022-03-23

## 2022-03-23 VITALS — HEIGHT: 66 IN | BODY MASS INDEX: 25.04 KG/M2 | WEIGHT: 155.8 LBS

## 2022-03-23 DIAGNOSIS — K80.20 CALCULUS OF GALLBLADDER WITHOUT CHOLECYSTITIS WITHOUT OBSTRUCTION: Primary | ICD-10-CM

## 2022-03-23 PROCEDURE — 99024 POSTOP FOLLOW-UP VISIT: CPT | Performed by: STUDENT IN AN ORGANIZED HEALTH CARE EDUCATION/TRAINING PROGRAM

## 2022-05-12 ENCOUNTER — OFFICE VISIT (OUTPATIENT)
Dept: INTERNAL MEDICINE | Facility: CLINIC | Age: 27
End: 2022-05-12

## 2022-05-12 VITALS
OXYGEN SATURATION: 100 % | HEART RATE: 56 BPM | DIASTOLIC BLOOD PRESSURE: 65 MMHG | BODY MASS INDEX: 25.39 KG/M2 | SYSTOLIC BLOOD PRESSURE: 102 MMHG | TEMPERATURE: 97.5 F | HEIGHT: 66 IN | WEIGHT: 158 LBS

## 2022-05-12 DIAGNOSIS — F41.9 ANXIETY AND DEPRESSION: Primary | ICD-10-CM

## 2022-05-12 DIAGNOSIS — F32.A ANXIETY AND DEPRESSION: Primary | ICD-10-CM

## 2022-05-12 PROCEDURE — 99214 OFFICE O/P EST MOD 30 MIN: CPT | Performed by: STUDENT IN AN ORGANIZED HEALTH CARE EDUCATION/TRAINING PROGRAM

## 2022-05-12 RX ORDER — ESCITALOPRAM OXALATE 20 MG/1
20 TABLET ORAL DAILY
Qty: 30 TABLET | Refills: 1 | Status: SHIPPED | OUTPATIENT
Start: 2022-05-12 | End: 2022-08-01 | Stop reason: SDUPTHER

## 2022-05-12 NOTE — PROGRESS NOTES
Aryan Naik D.O.  Internal Medicine  [facility]  [address]  [place] [address]  [phone]      Chief Complaint  Anxiety and Depression    SUBJECTIVE    History of Present Illness    Taty Chacko is a 27 y.o. female who presents to the office today as an established patient that last saw me on 2/3/2022.     Anxiety and depression: taking hydroxyzine 25 mg as needed for panic attacks.  She has not had to use one in a month or maybe longer. At last visit we also started escitalopram 10 mg daily. States that one day she just realized her symptoms weren't bothering her as much as she used to. States she doesn't cry as much. States she is open to trying a higher dose as still having some days with low motivation. States there are some days with struggling and lack of focus in school. States she is doing OK grade wise but she has had to ask for 3 extensions. States she remembers having similar issues at times when younger. States at work she notices herself starting 10 things at once and they ask her to just actually finish one thing. She is not consistently seeing a mental health counselor.       Allergies   Allergen Reactions   • Hydrocodone Itching and Rash        Outpatient Medications Marked as Taking for the 5/12/22 encounter (Office Visit) with Aryan Naik, DO   Medication Sig Dispense Refill   • hydrOXYzine (ATARAX) 25 MG tablet Take 25-50 mg by mouth Every 6 (Six) Hours As Needed.     • levonorgestrel (KYLEENA) 19.5 MG intrauterine device IUD 1 each by Intrauterine route 1 (One) Time.     • minocycline (MINOCIN,DYNACIN) 50 MG capsule Take 50 mg by mouth 2 (Two) Times a Day.  5   • Multiple Vitamins-Minerals (MULTIVITAMIN ADULT PO) Take 1 tablet by mouth Daily.     • ondansetron (Zofran) 4 MG tablet Take 1 tablet by mouth Daily As Needed for Nausea or Vomiting. 15 tablet 0   • pantoprazole (PROTONIX) 40 MG EC tablet Take 1 tablet by mouth Daily. 15 tablet 0   •  "topiramate (TOPAMAX) 50 MG tablet Take 1 tablet by mouth 2 (Two) Times a Day. 60 tablet 3   • traMADol (Ultram) 50 MG tablet Take 1 tablet by mouth Every 6 (Six) Hours As Needed for Moderate Pain . 15 tablet 0   • Ubrogepant (UBRELVY PO) Take 1 tablet/day by mouth Daily As Needed.     • [DISCONTINUED] escitalopram (Lexapro) 10 MG tablet Take 1 tablet by mouth Daily. Will need follow up for additional refills. 30 tablet 0        Past Medical History:   Diagnosis Date   • Acne    • Anxiety    • Arthritis    • GERD (gastroesophageal reflux disease)    • Kidney infection    • Kidney stone    • MCL sprain of left knee    • Migraine    • Thyroid nodule     colloid cysts   • Torn meniscus     right   • UTI (urinary tract infection)        OBJECTIVE    Vital Signs:   /65   Pulse 56   Temp 97.5 °F (36.4 °C) (Temporal)   Ht 167.6 cm (66\")   Wt 71.7 kg (158 lb)   SpO2 100%   BMI 25.50 kg/m²     Physical Exam  Vitals reviewed.   Constitutional:       General: She is not in acute distress.     Appearance: Normal appearance. She is not ill-appearing.   HENT:      Head: Atraumatic.   Eyes:      General: No scleral icterus.  Pulmonary:      Effort: Pulmonary effort is normal. No respiratory distress.   Neurological:      Mental Status: She is alert.   Psychiatric:         Mood and Affect: Mood normal.         Behavior: Behavior normal.         Thought Content: Thought content normal.                             ASSESSMENT & PLAN     Diagnoses and all orders for this visit:    1. Anxiety and depression (Primary)  -partial improvement with addition of escitalopram 10 mg daily to as needed hydroxyzine at last visit  -PHQ9 decreased from 12 to 11 but GAD7 has had a more significant decrease from 21 to 11  -overall reports that she is satisfied with the improvement she has had; experiencing less episodes of crying, having more increased mood, less acute anxiety in situations  -she does report some symptoms that could be " related to attention deficit issues; she has had some of these symptoms since childhood. It is reasonable for her to get neuropsych eval as well. [facility]  -she is agreeable to increase escitalopram to 20 mg daily and will bring her back in a month to assess for further improvement  -     escitalopram (Lexapro) 20 MG tablet; Take 1 tablet by mouth Daily.  Dispense: 30 tablet; Refill: 1            The following social determinates of health impact the patient's medical decision making: No social determinates of health were factored in to today's visit.     Follow Up  Return in about 5 weeks (around 6/16/2022) for Annual physical.    Patient/family had no further questions at this time and verbalized understanding of the plan discussed today.

## 2022-06-01 DIAGNOSIS — F32.A ANXIETY AND DEPRESSION: ICD-10-CM

## 2022-06-01 DIAGNOSIS — F41.9 ANXIETY AND DEPRESSION: ICD-10-CM

## 2022-06-01 RX ORDER — ESCITALOPRAM OXALATE 20 MG/1
20 TABLET ORAL DAILY
Qty: 30 TABLET | Refills: 1 | OUTPATIENT
Start: 2022-06-01

## 2022-06-24 ENCOUNTER — OFFICE VISIT (OUTPATIENT)
Dept: INTERNAL MEDICINE | Facility: CLINIC | Age: 27
End: 2022-06-24

## 2022-06-24 VITALS
BODY MASS INDEX: 24.94 KG/M2 | WEIGHT: 155.2 LBS | TEMPERATURE: 97.7 F | HEIGHT: 66 IN | DIASTOLIC BLOOD PRESSURE: 60 MMHG | HEART RATE: 69 BPM | SYSTOLIC BLOOD PRESSURE: 100 MMHG | OXYGEN SATURATION: 99 %

## 2022-06-24 DIAGNOSIS — F41.9 ANXIETY AND DEPRESSION: ICD-10-CM

## 2022-06-24 DIAGNOSIS — F99 INSOMNIA DUE TO OTHER MENTAL DISORDER: ICD-10-CM

## 2022-06-24 DIAGNOSIS — F51.05 INSOMNIA DUE TO OTHER MENTAL DISORDER: ICD-10-CM

## 2022-06-24 DIAGNOSIS — F32.A ANXIETY AND DEPRESSION: ICD-10-CM

## 2022-06-24 DIAGNOSIS — Z00.00 ANNUAL PHYSICAL EXAM: Primary | ICD-10-CM

## 2022-06-24 PROCEDURE — 99395 PREV VISIT EST AGE 18-39: CPT | Performed by: STUDENT IN AN ORGANIZED HEALTH CARE EDUCATION/TRAINING PROGRAM

## 2022-06-24 PROCEDURE — 99213 OFFICE O/P EST LOW 20 MIN: CPT | Performed by: STUDENT IN AN ORGANIZED HEALTH CARE EDUCATION/TRAINING PROGRAM

## 2022-06-24 RX ORDER — PANTOPRAZOLE SODIUM 40 MG/1
40 TABLET, DELAYED RELEASE ORAL DAILY
Qty: 90 TABLET | Refills: 1 | Status: SHIPPED | OUTPATIENT
Start: 2022-06-24 | End: 2023-01-11 | Stop reason: SDUPTHER

## 2022-06-24 RX ORDER — HYDROXYZINE HYDROCHLORIDE 25 MG/1
25-50 TABLET, FILM COATED ORAL DAILY PRN
Qty: 30 TABLET | Refills: 3 | Status: SHIPPED | OUTPATIENT
Start: 2022-06-24 | End: 2022-07-15

## 2022-06-24 NOTE — PROGRESS NOTES
"  Aryan Naik D.O.  Internal Medicine  [facility]  [address], [address]  [place] [address]  [phone]    Chief Complaint  Annual checkup    HISTORY    Taty Chacko is a 27 y.o. female who presents to the office today as a  an established patient for their annual preventative exam.     No hospitalization(s) within the last year.     Status of chronic medical conditions:    Anxiety: takes hydroxyzine 25 mg as needed for panic attacks  As well as escitalopram 20 mg daily. States she has had some life events that have been stressful during the last few weeks and impacted sleep.  States she has taken melatonin before and it makes her groggy the next day.   Her boyfriend cheated on her. She is going to get into therapist next week if possible.      Ache: previously took minocycline, was prescribed by [facility]. Has not been on for a few months. States skin is currently clear.      GERD: takes pantoprazole 40 mg daily. \"I can tell if I miss a dose\".      Migraines: pt states she has a family history of migraines, she sees a neurologist here with [facility]. States she takes Ubrelvy 3 or 4 times a month. She had a Rx for topiramate but doesn't take.    Patient's overall comments about their health or any other specific health concerns they report:    First day of last menstrual period if pre-menopausal: 5/29/22    Patient's contraception status (if applicable): IUD      Health Maintenance Summary          Overdue - TDAP/TD VACCINES (1 - Tdap) Overdue since 2/27/2016 02/26/2016  Imm Admin: Td, Not Adsorbed          Overdue - COVID-19 Vaccine (3 - Booster for Pfizer series) Overdue since 7/13/2021 02/13/2021  Imm Admin: COVID-19 (PFIZER) PURPLE CAP    01/23/2021  Imm Admin: COVID-19 (PFIZER) PURPLE CAP          INFLUENZA VACCINE (Yearly - October to March) Next due on 10/1/2022    11/04/2020  Imm Admin: flucelvax quad pfs =>4 YRS    10/12/2020  Imm Admin: " Influenza, Unspecified    10/29/2019  Imm Admin: Flu Vaccine Quad PF >36MO    01/22/2014  Imm Admin: FluMist 2-49yrs          ANNUAL PHYSICAL (Yearly) Next due on 6/25/2023 06/24/2022  Done          PAP SMEAR (Every 3 Years) Next due on 8/13/2023 08/13/2020  Outside Procedure: AL CYTOPATH CERV/VAG THIN LAYER          HEPATITIS C SCREENING  Completed    02/03/2022  Hep C Virus Ab component of Hepatitis C antibody          Pneumococcal Vaccine 0-64 (Series Information) Aged Out    No completion history exists for this topic.                 Allergies   Allergen Reactions   • Hydrocodone Itching and Rash        Outpatient Medications Marked as Taking for the 6/24/22 encounter (Office Visit) with Aryan Naik, DO   Medication Sig Dispense Refill   • escitalopram (Lexapro) 20 MG tablet Take 1 tablet by mouth Daily. 30 tablet 1   • hydrOXYzine (ATARAX) 25 MG tablet Take 1-2 tablets by mouth Daily As Needed for Anxiety. 30 tablet 3   • levonorgestrel (KYLEENA) 19.5 MG intrauterine device IUD 1 each by Intrauterine route 1 (One) Time.     • Multiple Vitamins-Minerals (MULTIVITAMIN ADULT PO) Take 1 tablet by mouth Daily.     • ondansetron (Zofran) 4 MG tablet Take 1 tablet by mouth Daily As Needed for Nausea or Vomiting. 15 tablet 0   • pantoprazole (PROTONIX) 40 MG EC tablet Take 1 tablet by mouth Daily. 90 tablet 1   • Ubrogepant (UBRELVY PO) Take 1 tablet/day by mouth Daily As Needed.     • [DISCONTINUED] hydrOXYzine (ATARAX) 25 MG tablet Take 25-50 mg by mouth Every 6 (Six) Hours As Needed.     • [DISCONTINUED] pantoprazole (PROTONIX) 40 MG EC tablet Take 1 tablet by mouth Daily. 15 tablet 0        Past Medical History:   Diagnosis Date   • Acne    • Anxiety    • Arthritis    • GERD (gastroesophageal reflux disease)    • Kidney infection    • Kidney stone    • MCL sprain of left knee    • Migraine    • Thyroid nodule     colloid cysts   • Torn meniscus     right   • UTI (urinary tract infection)        Immunization  "History   Administered Date(s) Administered   • COVID-19 (PFIZER) PURPLE CAP 01/23/2021, 02/13/2021   • DTaP, Unspecified 07/06/1999   • Flu Vaccine Quad PF >36MO 10/29/2019   • FluMist 2-49yrs 01/22/2014   • Influenza, Unspecified 10/12/2020   • MMR 07/06/1999   • OPV 07/06/1999   • Td, Not Adsorbed 02/26/2016   • flucelvax quad pfs =>4 YRS 11/04/2020        OBJECTIVE    Vital Signs:   /60   Pulse 69   Temp 97.7 °F (36.5 °C) (Temporal)   Ht 167.6 cm (66\")   Wt 70.4 kg (155 lb 3.2 oz)   SpO2 99%   BMI 25.05 kg/m²     Physical Exam  Vitals reviewed.   Constitutional:       General: She is not in acute distress.     Appearance: Normal appearance. She is not ill-appearing.   HENT:      Head: Normocephalic and atraumatic.      Right Ear: Tympanic membrane, ear canal and external ear normal. There is no impacted cerumen.      Left Ear: Tympanic membrane, ear canal and external ear normal. There is no impacted cerumen.      Mouth/Throat:      Mouth: Mucous membranes are moist.      Pharynx: No oropharyngeal exudate or posterior oropharyngeal erythema.   Eyes:      General: No scleral icterus.     Extraocular Movements: Extraocular movements intact.      Conjunctiva/sclera: Conjunctivae normal.      Pupils: Pupils are equal, round, and reactive to light.   Cardiovascular:      Rate and Rhythm: Normal rate and regular rhythm.      Heart sounds: Normal heart sounds. No murmur heard.  Pulmonary:      Effort: Pulmonary effort is normal. No respiratory distress.      Breath sounds: Normal breath sounds. No wheezing or rhonchi.   Abdominal:      General: Bowel sounds are normal. There is no distension.      Palpations: Abdomen is soft.      Tenderness: There is no abdominal tenderness. There is no guarding.   Musculoskeletal:      Cervical back: Neck supple. No tenderness.      Right lower leg: No edema.      Left lower leg: No edema.   Lymphadenopathy:      Cervical: No cervical adenopathy.   Skin:     General: Skin " is warm and dry.      Coloration: Skin is not jaundiced.   Neurological:      General: No focal deficit present.      Mental Status: She is alert and oriented to person, place, and time.      Cranial Nerves: No cranial nerve deficit.      Motor: No weakness.   Psychiatric:         Mood and Affect: Mood normal.         Behavior: Behavior normal.         Thought Content: Thought content normal.                         ASSESSMENT & PLAN     #Annual Preventative Health Examination   -Age and sex appropriate physical exam performed and documented. Updated past medical, family, social and surgical histories as well as allergies and care team list. Addressed care gaps listed in the medical record.  -Encouraged seat belt use for every car ride for patient and all occupants.  Encouraged sunscreen use to reduce risk of skin cancer for any days with sun exposure over 20 minutes.  Discussed the importance of smoke and carbon monoxide detectors in the home.   -Encouraged annual dental and vision exams as part of their overall health.  -Encouraged minimum of 30 minutes or more of exercise at a brisk walk or higher 5 days per week combined with a well-balanced diet.  -Immunizations reviewed and updated in EMR. Recommended COVID 19 booster.   -Advised that all women who are planning or capable of pregnancy take a daily supplement containing 0.4 to 0.8 mg (400 to 800 ?g) of folic acid.  -Lipid screening:  Patient is less than age 40 and has had a screening lipid panel for familial hypercholesterolemia that was NEGATIVE.    -Aspirin for primary or secondary prevention: Not applicable, patient is less than age 50.  -Depression screening: Pt has diagnosis of depression and is being treated.   -Diabetes screening:  Screening not indicated at this time.   -Tobacco use screening: Patient denies cigarette use. Tobacco counseling was was not indicated.  -Alcohol use screening: Patient reports drinking 2 drinks per week.. Alcohol abuse  counseling was was not indicated.  -Illicit drug screening: Patient does use illicit drugs. Pt uses marijuana.Recommended pt decrease or abstain from usage.   -Hypertension screening: Patient screened negative for HTN today.  -HIV screening: Discussed with patient the importance of identifying asymptomatic HIV infection for adults in their age group with a one time screening test .Patient declined screening.   -Syphilis screening: Syphilis screening not indicated.  -Hepatitis B virus screening: Screening not indicated, not in a high-risk group.  -Hepatitis C virus screening:  Screening up to date and negative  -Colon cancer screening: Patient is less than age 45 and colon cancer screening is not indicated.  -Lung cancer screening: Patient has never smoked.  -Cervical cancer screening:  Informed patient that the USPSTF recommends screening for cervical cancer every 3 years with cervical cytology alone in women aged 21 to 29 years. For women aged 30 to 65 years, the USPSTF recommends screening every 3 years with cervical cytology alone, every 5 years with high-risk human papillomavirus (hrHPV) testing alone, or every 5 years with HPV testing in combination with cytology (cotesting). Lasp pap: approximate date 8/2020 and was normal   -Breast cancer screening: Informed patient that the USPSTF recommends biennial screening mammography for women aged 50 to 74 years.Screening not indicated.   -BRCA related cancer screening: Informed patient that the USPSTF recommends that primary care clinicians assess women with a personal or family history of breast, ovarian, tubal, or peritoneal cancer or who have an ancestry associated with breast cancer susceptibility 1 and 2 (BRCA1/2) gene mutations with an appropriate brief familial risk assessment tool and referred to genetic counseling if risk assessment is positive. Patient does not have a known personal or family history.   -Osteoporosis screening: Informed patient that the  USPSTF recommends screening for osteoporosis with bone measurement testing to prevent osteoporotic fractures in women 65 years and older. screening is not indicated at this time.     Follow up in 1 year for annual physical exam.      A problem-based visit was also conducted on the same day, see below for assessment and plan    Diagnoses and all orders for this visit:    1. Anxiety and depression (Primary)  2. Insomnia due to other mental disorder  -takes hydroxyzine 25 mg once daily as needed for panic attacks  as well as escitalopram 20 mg daily. States she has had some life events that have been stressful during the last few weeks and impacted sleep.  States she has taken melatonin before and it makes her groggy the next day.   -escitalopram was increased from 10 mg daily at last visit and she seems to be tolerating this well and reports overall decrease in baseline anxiety and depression  -recommended Unisom for temporary use for insomnia as she deals with changes in her relationship as well as getting back into therapy   -continue to monitor as she adjusts to her relationship changes   -     hydrOXYzine (ATARAX) 25 MG tablet; Take 1-2 tablets by mouth Daily As Needed for Anxiety.  Dispense: 30 tablet; Refill: 3                The following social determinates of health impact the patient's medical decision making: No social determinates of health were factored in to today's visit.     Follow Up  Return in about 6 months (around 12/24/2022) for Recheck.    Patient/family had no further questions at this time and verbalized understanding of the plan discussed today.

## 2022-07-15 DIAGNOSIS — F32.A ANXIETY AND DEPRESSION: ICD-10-CM

## 2022-07-15 DIAGNOSIS — F41.9 ANXIETY AND DEPRESSION: ICD-10-CM

## 2022-07-15 RX ORDER — HYDROXYZINE HYDROCHLORIDE 25 MG/1
25-50 TABLET, FILM COATED ORAL DAILY PRN
Qty: 30 TABLET | Refills: 3 | Status: SHIPPED | OUTPATIENT
Start: 2022-07-15

## 2022-08-01 DIAGNOSIS — F32.A ANXIETY AND DEPRESSION: ICD-10-CM

## 2022-08-01 DIAGNOSIS — F41.9 ANXIETY AND DEPRESSION: ICD-10-CM

## 2022-08-01 RX ORDER — ESCITALOPRAM OXALATE 20 MG/1
20 TABLET ORAL DAILY
Qty: 90 TABLET | Refills: 1 | Status: SHIPPED | OUTPATIENT
Start: 2022-08-01 | End: 2023-01-02 | Stop reason: SDUPTHER

## 2022-08-04 DIAGNOSIS — F32.A ANXIETY AND DEPRESSION: ICD-10-CM

## 2022-08-04 DIAGNOSIS — F41.9 ANXIETY AND DEPRESSION: ICD-10-CM

## 2022-08-04 RX ORDER — HYDROXYZINE HYDROCHLORIDE 25 MG/1
25-50 TABLET, FILM COATED ORAL DAILY PRN
Qty: 180 TABLET | Refills: 3 | OUTPATIENT
Start: 2022-08-04

## 2022-08-17 NOTE — TELEPHONE ENCOUNTER
Called regarding US and HIDA results.     HIDA scan EF 39%. US with multiple polyps.     Discussed follow up US versus cholecystectomy due to symptoms. She would like to proceed with cholecystectomy. Orders placed. Will schedule.     Bushra Loyola MD    oriented x3, no focal deficit, CNS grossly intact

## 2022-11-22 ENCOUNTER — OFFICE VISIT (OUTPATIENT)
Dept: NEUROLOGY | Facility: CLINIC | Age: 27
End: 2022-11-22

## 2022-11-22 VITALS
WEIGHT: 174 LBS | SYSTOLIC BLOOD PRESSURE: 112 MMHG | DIASTOLIC BLOOD PRESSURE: 76 MMHG | BODY MASS INDEX: 27.97 KG/M2 | OXYGEN SATURATION: 97 % | HEIGHT: 66 IN | HEART RATE: 56 BPM

## 2022-11-22 DIAGNOSIS — G43.001 MIGRAINE WITHOUT AURA AND WITH STATUS MIGRAINOSUS, NOT INTRACTABLE: Primary | ICD-10-CM

## 2022-11-22 PROCEDURE — 99213 OFFICE O/P EST LOW 20 MIN: CPT | Performed by: NURSE PRACTITIONER

## 2022-11-22 RX ORDER — GALCANEZUMAB 120 MG/ML
120 INJECTION, SOLUTION SUBCUTANEOUS
Qty: 1 ML | Refills: 2 | Status: SHIPPED | OUTPATIENT
Start: 2022-11-22 | End: 2023-01-02 | Stop reason: SDUPTHER

## 2022-11-22 NOTE — PROGRESS NOTES
Subjective   Taty Chacko is a 27 y.o. female presenting for follow-up for migraine headaches.  She was previously on topiramate but states that she is bad about taking her medications daily and therefore has been off of the medication for at least a month or more.  She recently took a new position at work and has been under a bit more stress which has increased the frequency of her headaches.  At her visit last year she mentioned that she may be desiring pregnancy soon.  We discussed this again today and she states that she is at least a year away from this.  She is taking oral contraceptive pills at this time.  She continues to use the Ubrelvy as needed.  This works well for her.    History of Present Illness     Review of Systems   Eyes: Positive for blurred vision, double vision and photophobia. Negative for visual disturbance.   Gastrointestinal: Positive for nausea. Negative for diarrhea and vomiting.   Endocrine: Negative for cold intolerance, heat intolerance and polydipsia.   Musculoskeletal: Negative for back pain, neck pain and neck stiffness.   Skin: Negative for rash, wound and bruise.   Allergic/Immunologic: Negative for environmental allergies, food allergies and immunocompromised state.   Neurological: Positive for headache. Negative for dizziness, tremors, seizures, syncope, facial asymmetry, speech difficulty, weakness, light-headedness, numbness, memory problem and confusion.   Hematological: Negative for adenopathy. Does not bruise/bleed easily.   Psychiatric/Behavioral: Negative for sleep disturbance and stress. The patient is not nervous/anxious.        I have reviewed and confirmed the accuracy of the patient's history: Chief complaint, HPI, ROS, Subjective and Past Family Social History as entered by the MA/RAMONA/RN. Danita Harris MA 11/22/22      Objective     Physical Examination:  General Appearance:  Well developed, well nourished, well groomed, alert, and cooperative.  HEENT:  Normocephalic.    Neck and Spine: Normal range of motion.  Normal alignment. No mass or tenderness. No bruits.  Cardiac: Regular rate and rhythm. No murmurs.  Peripheral Vasculature: Radial and pedal pulses are equal and symmetric.   Extremities: No edema or deformities. Normal joint ROM.  Skin: No rashes or birth marks.      Neurological examination:   Higher Integrative Function: Oriented to time, place, and person. Normal registration, recall attention span and concentration. Normal language including comprehension, spontaneous speech, repetition, reading, writing, naming and vocabulary. No neglect with normal visual-spatial function and construction. Normal fund of knowledge and higher integrative function.   CN II: Pupils are equal, round and reactive to light. Normal visual acuity and visual fields.   CN III IV VI: Extraocular movements are full without nystagmus.   CN V: Normal facial sensation and strength of muscles of mastication.   CN VII: Facial movements are symmetric. No weakness.   CN VIII: Auditory acuity is normal.  CN IX & X: Symmetric palatal movement.   CN XI: Sternocleidomastoid and trapezius are normal. No weakness.   CN XII: The tongue is midline. No atrophy or fasciculations.  Motor: Normal muscle strength, bulk and tone in upper and lower extremities. No fasciculations, rigidity, spasticity, or abnormal movements.   Reflexes: 2+ in the upper and lower extremities. Plantar responses are flexor.   Sensation: Normal light touch, pinprick, vibration, temperature, and proprioception in the arms and legs.   Station and Gait: Normal gait and station.   Coordination: Finger to nose test shows no dysmetria. Rapid alternating movements are normal. Heel to shin is normal.           Assessment & Plan   Diagnoses and all orders for this visit:    1. Migraine without aura and with status migrainosus, not intractable (Primary)    Other orders  -     galcanezumab-gnlm (Emgality) 120 MG/ML auto-injector pen;  Inject 1 mL under the skin into the appropriate area as directed Every 30 (Thirty) Days.  Dispense: 1 mL; Refill: 2  -     ubrogepant 100 MG tablet; Take one tablet by mouth at the onset of migraine. May repeat in 2 hours if needed. Do not exceed 2 tablets in 24 hours.  Dispense: 16 tablet; Refill: 5       Given that she has difficulty remembering to take pills on a daily basis, I recommended that we discontinue the topiramate and try Emgality.  She will start with a 240 mg injection loading dose and then she will take 120 mg every 30 days thereafter.  She states that the idea of giving herself an injection makes her a little uneasy but that her sister is a nurse and also a diabetic and she will have her sister do this for her.  Sukhdev showed to the patient.  Instructions were given.  Side effects were discussed.  She will call with any problems.  She will continue Ubrelvy as needed.  If this does not work well for her we discussed the option of trying Nurtec, as this can be used preventatively or as needed and may offer her a little more flexibility than a daily tablet.

## 2022-11-30 ENCOUNTER — OFFICE VISIT (OUTPATIENT)
Dept: INTERNAL MEDICINE | Facility: CLINIC | Age: 27
End: 2022-11-30

## 2022-11-30 VITALS
SYSTOLIC BLOOD PRESSURE: 121 MMHG | BODY MASS INDEX: 27.48 KG/M2 | TEMPERATURE: 98.3 F | WEIGHT: 171 LBS | HEIGHT: 66 IN | DIASTOLIC BLOOD PRESSURE: 82 MMHG

## 2022-11-30 DIAGNOSIS — R19.7 DIARRHEA, UNSPECIFIED TYPE: Primary | ICD-10-CM

## 2022-11-30 PROCEDURE — 99213 OFFICE O/P EST LOW 20 MIN: CPT | Performed by: NURSE PRACTITIONER

## 2022-11-30 RX ORDER — DEXTROAMPHETAMINE/AMPHETAMINE 10 MG
CAPSULE, EXT RELEASE 24 HR ORAL
COMMUNITY
Start: 2022-11-28 | End: 2023-01-11

## 2022-11-30 RX ORDER — METRONIDAZOLE 500 MG/1
500 TABLET ORAL 3 TIMES DAILY
Qty: 21 TABLET | Refills: 0 | Status: SHIPPED | OUTPATIENT
Start: 2022-11-30 | End: 2023-01-11

## 2022-11-30 NOTE — PROGRESS NOTES
Subjective   Taty Chacko is a 27 y.o. female.   Chief Complaint   Patient presents with   • low grade fever last night 99.1   • Diarrhea     Since Friday      Vitals:    11/30/22 1124   BP: 121/82   Temp: 98.3 °F (36.8 °C)     No LMP recorded. Patient has had an implant.    History of Present Illness  Taty is a 27 year old female patient of Dr Naik who is here for an acute visit. She c/o diarrhea for 5 days.   Diarrhea   This is a new problem. Episode onset: 5 days  The problem occurs 5 to 10 times per day. The problem has been unchanged. The stool consistency is described as mucous, watery and blood tinged. Associated symptoms include abdominal pain (upper) and a fever. Pertinent negatives include no sweats, URI or vomiting. Nothing aggravates the symptoms. There are no known risk factors. She has tried increased fluids and change of diet (probiotic ) for the symptoms. had colitis last year         The following portions of the patient's history were reviewed and updated as appropriate: allergies, current medications, past family history, past medical history, past social history, past surgical history and problem list.    Review of Systems   Constitutional: Positive for fever.   Respiratory: Negative.    Cardiovascular: Negative.    Gastrointestinal: Positive for abdominal pain (upper) and diarrhea. Negative for vomiting.       Objective   Physical Exam  Vitals and nursing note reviewed.   Constitutional:       General: She is not in acute distress.     Appearance: Normal appearance. She is well-developed and well-groomed.   Cardiovascular:      Rate and Rhythm: Normal rate and regular rhythm.   Pulmonary:      Effort: Pulmonary effort is normal.      Breath sounds: Normal breath sounds.   Abdominal:      General: Bowel sounds are normal.      Palpations: Abdomen is soft.      Tenderness: There is no abdominal tenderness.   Neurological:      Mental Status: She is alert.         Assessment & Plan   Diagnoses  and all orders for this visit:    1. Diarrhea, unspecified type (Primary)  -     CBC & Differential  -     Comprehensive Metabolic Panel    Other orders  -     metroNIDAZOLE (Flagyl) 500 MG tablet; Take 1 tablet by mouth 3 (Three) Times a Day.  Dispense: 21 tablet; Refill: 0      Will check labs today  Due to h/o colitis and symptoms will treat with flagyl  Recommend florastor otc  If no improvement or symptoms worsen, will get CT scan and stool studies  Recommend BRAT diet , push fluids  Advised to go to the ER for severe abdominal pain, temp >101,

## 2022-12-01 LAB
ALBUMIN SERPL-MCNC: 4.3 G/DL (ref 3.5–5.2)
ALBUMIN/GLOB SERPL: 1.7 G/DL
ALP SERPL-CCNC: 79 U/L (ref 39–117)
ALT SERPL-CCNC: 18 U/L (ref 1–33)
AST SERPL-CCNC: 17 U/L (ref 1–32)
BASOPHILS # BLD AUTO: 0.07 10*3/MM3 (ref 0–0.2)
BASOPHILS NFR BLD AUTO: 0.9 % (ref 0–1.5)
BILIRUB SERPL-MCNC: 0.4 MG/DL (ref 0–1.2)
BUN SERPL-MCNC: 9 MG/DL (ref 6–20)
BUN/CREAT SERPL: 10.7 (ref 7–25)
CALCIUM SERPL-MCNC: 9.8 MG/DL (ref 8.6–10.5)
CHLORIDE SERPL-SCNC: 105 MMOL/L (ref 98–107)
CO2 SERPL-SCNC: 25 MMOL/L (ref 22–29)
CREAT SERPL-MCNC: 0.84 MG/DL (ref 0.57–1)
EGFRCR SERPLBLD CKD-EPI 2021: 97.2 ML/MIN/1.73
EOSINOPHIL # BLD AUTO: 0.11 10*3/MM3 (ref 0–0.4)
EOSINOPHIL NFR BLD AUTO: 1.4 % (ref 0.3–6.2)
ERYTHROCYTE [DISTWIDTH] IN BLOOD BY AUTOMATED COUNT: 13.5 % (ref 12.3–15.4)
GLOBULIN SER CALC-MCNC: 2.6 GM/DL
GLUCOSE SERPL-MCNC: 105 MG/DL (ref 65–99)
HCT VFR BLD AUTO: 40 % (ref 34–46.6)
HGB BLD-MCNC: 13 G/DL (ref 12–15.9)
IMM GRANULOCYTES # BLD AUTO: 0.03 10*3/MM3 (ref 0–0.05)
IMM GRANULOCYTES NFR BLD AUTO: 0.4 % (ref 0–0.5)
LYMPHOCYTES # BLD AUTO: 2.3 10*3/MM3 (ref 0.7–3.1)
LYMPHOCYTES NFR BLD AUTO: 29.8 % (ref 19.6–45.3)
MCH RBC QN AUTO: 26.8 PG (ref 26.6–33)
MCHC RBC AUTO-ENTMCNC: 32.5 G/DL (ref 31.5–35.7)
MCV RBC AUTO: 82.5 FL (ref 79–97)
MONOCYTES # BLD AUTO: 0.63 10*3/MM3 (ref 0.1–0.9)
MONOCYTES NFR BLD AUTO: 8.2 % (ref 5–12)
NEUTROPHILS # BLD AUTO: 4.57 10*3/MM3 (ref 1.7–7)
NEUTROPHILS NFR BLD AUTO: 59.3 % (ref 42.7–76)
NRBC BLD AUTO-RTO: 0 /100 WBC (ref 0–0.2)
PLATELET # BLD AUTO: 376 10*3/MM3 (ref 140–450)
POTASSIUM SERPL-SCNC: 4.5 MMOL/L (ref 3.5–5.2)
PROT SERPL-MCNC: 6.9 G/DL (ref 6–8.5)
RBC # BLD AUTO: 4.85 10*6/MM3 (ref 3.77–5.28)
SODIUM SERPL-SCNC: 144 MMOL/L (ref 136–145)
WBC # BLD AUTO: 7.71 10*3/MM3 (ref 3.4–10.8)

## 2023-01-02 DIAGNOSIS — F41.9 ANXIETY AND DEPRESSION: ICD-10-CM

## 2023-01-02 DIAGNOSIS — F32.A ANXIETY AND DEPRESSION: ICD-10-CM

## 2023-01-03 RX ORDER — DEXTROAMPHETAMINE/AMPHETAMINE 10 MG
CAPSULE, EXT RELEASE 24 HR ORAL
OUTPATIENT
Start: 2023-01-03

## 2023-01-03 RX ORDER — GALCANEZUMAB 120 MG/ML
120 INJECTION, SOLUTION SUBCUTANEOUS
Qty: 1 ML | Refills: 2 | Status: SHIPPED | OUTPATIENT
Start: 2023-01-03 | End: 2023-04-03

## 2023-01-03 RX ORDER — ESCITALOPRAM OXALATE 20 MG/1
20 TABLET ORAL DAILY
Qty: 90 TABLET | Refills: 1 | Status: SHIPPED | OUTPATIENT
Start: 2023-01-03 | End: 2023-01-11

## 2023-01-11 ENCOUNTER — OFFICE VISIT (OUTPATIENT)
Dept: INTERNAL MEDICINE | Facility: CLINIC | Age: 28
End: 2023-01-11
Payer: COMMERCIAL

## 2023-01-11 VITALS
BODY MASS INDEX: 27.97 KG/M2 | DIASTOLIC BLOOD PRESSURE: 70 MMHG | WEIGHT: 174 LBS | HEART RATE: 60 BPM | OXYGEN SATURATION: 96 % | SYSTOLIC BLOOD PRESSURE: 100 MMHG | HEIGHT: 66 IN

## 2023-01-11 DIAGNOSIS — F42.9 OBSESSIVE-COMPULSIVE DISORDER, UNSPECIFIED TYPE: ICD-10-CM

## 2023-01-11 DIAGNOSIS — F90.9 ATTENTION DEFICIT HYPERACTIVITY DISORDER (ADHD), UNSPECIFIED ADHD TYPE: Primary | ICD-10-CM

## 2023-01-11 DIAGNOSIS — F41.9 ANXIETY: ICD-10-CM

## 2023-01-11 DIAGNOSIS — K21.9 GASTROESOPHAGEAL REFLUX DISEASE, UNSPECIFIED WHETHER ESOPHAGITIS PRESENT: ICD-10-CM

## 2023-01-11 DIAGNOSIS — R61 NIGHT SWEATS: ICD-10-CM

## 2023-01-11 PROCEDURE — 99214 OFFICE O/P EST MOD 30 MIN: CPT | Performed by: STUDENT IN AN ORGANIZED HEALTH CARE EDUCATION/TRAINING PROGRAM

## 2023-01-11 RX ORDER — DEXTROAMPHETAMINE/AMPHETAMINE 10 MG
10 CAPSULE, EXT RELEASE 24 HR ORAL DAILY PRN
Status: SHIPPED | COMMUNITY
Start: 2023-01-11

## 2023-01-11 RX ORDER — ESCITALOPRAM OXALATE 20 MG/1
30 TABLET ORAL DAILY
Qty: 90 TABLET | Refills: 1 | COMMUNITY
Start: 2023-01-11 | End: 2023-04-03

## 2023-01-11 RX ORDER — PANTOPRAZOLE SODIUM 40 MG/1
40 TABLET, DELAYED RELEASE ORAL DAILY
Qty: 90 TABLET | Refills: 1 | Status: SHIPPED | OUTPATIENT
Start: 2023-01-11

## 2023-01-11 NOTE — PROGRESS NOTES
Aryan Naik D.O.  Internal Medicine  [facility]  [address], [address]  [place] [address]  [phone]      Chief Complaint  Anxiety    SUBJECTIVE    History of Present Illness    Taty Chacko is a 27 y.o. female who presents to the office today as an established patient that last saw me on 6/24/2022.     Anxiety/ADHD/OCD: Anxiety was previously managed by me but she is now seeing a psychiatrist at [facility] who has also diagnosed her with COD and ADHD. Pt reports  now taking escitalopram 30 mg daily , hydroxyzine as needed and adderall XR 10 mg daily as needed.     GERD: prescribed pantoprazole 40 mg daily but states she no longer has an Rx for it.     States she has developed some night sweats since starting oral birth control and is going to seek out new GYN.     Allergies   Allergen Reactions   • Hydrocodone Itching and Rash        Outpatient Medications Marked as Taking for the 1/11/23 encounter (Office Visit) with Aryan Naik, DO   Medication Sig Dispense Refill   • Adderall XR 10 MG 24 hr capsule 1 capsule Daily As Needed     • Drospirenone-Estetrol 3-14.2 MG tablet Take 1 tablet by mouth Daily.     • escitalopram (Lexapro) 20 MG tablet Take 1.5 tablets by mouth Daily. 90 tablet 1   • galcanezumab-gnlm (Emgality) 120 MG/ML auto-injector pen Inject 1 mL under the skin into the appropriate area as directed Every 30 (Thirty) Days. 1 mL 2   • hydrOXYzine (ATARAX) 25 MG tablet TAKE 1-2 TABLETS BY MOUTH DAILY AS NEEDED FOR ANXIETY. 30 tablet 3   • Multiple Vitamins-Minerals (MULTIVITAMIN ADULT PO) Take 1 tablet by mouth Daily.     • ondansetron (Zofran) 4 MG tablet Take 1 tablet by mouth Daily As Needed for Nausea or Vomiting. 15 tablet 0   • pantoprazole (PROTONIX) 40 MG EC tablet Take 1 tablet by mouth Daily. 90 tablet 1   • ubrogepant 100 MG tablet Take one tablet by mouth at the onset of migraine. May repeat in 2 hours if needed. Do not exceed 2 tablets in 24 hours. 16  "tablet 5   • [DISCONTINUED] Adderall XR 10 MG 24 hr capsule      • [DISCONTINUED] escitalopram (Lexapro) 20 MG tablet Take 1 tablet by mouth Daily. 90 tablet 1   • [DISCONTINUED] pantoprazole (PROTONIX) 40 MG EC tablet Take 1 tablet by mouth Daily. 90 tablet 1        Past Medical History:   Diagnosis Date   • Acne    • Anxiety    • Arthritis    • GERD (gastroesophageal reflux disease)    • Kidney infection    • Kidney stone    • MCL sprain of left knee    • Migraine    • Thyroid nodule     colloid cysts   • Torn meniscus     right   • UTI (urinary tract infection)        OBJECTIVE    Vital Signs:   /70   Pulse 60   Ht 167.6 cm (66\")   Wt 78.9 kg (174 lb)   SpO2 96%   BMI 28.08 kg/m²     Physical Exam  Vitals reviewed.   Constitutional:       General: She is not in acute distress.     Appearance: Normal appearance. She is not ill-appearing.   HENT:      Head: Atraumatic.   Eyes:      General: No scleral icterus.  Pulmonary:      Effort: Pulmonary effort is normal. No respiratory distress.   Skin:     Coloration: Skin is not jaundiced.   Neurological:      Mental Status: She is alert.   Psychiatric:         Mood and Affect: Mood normal.         Behavior: Behavior normal.         Thought Content: Thought content normal.                             ASSESSMENT & PLAN     Diagnoses and all orders for this visit:    1. Attention deficit hyperactivity disorder (ADHD), unspecified ADHD type (Primary)  2. Anxiety  3. Obsessive-compulsive disorder, unspecified type  - Anxiety was previously managed by me but she is now seeing a psychiatrist at [facility] who has also diagnosed her with COD and ADHD. Pt reports  now taking escitalopram 30 mg daily , hydroxyzine as needed and adderall XR 10 mg daily as needed.  -will leave further management of this concern to her psychiatric professional    4. Gastroesophageal reflux disease, unspecified whether esophagitis present  -continue     pantoprazole (PROTONIX) 40 MG EC " tablet; Take 1 tablet by mouth Daily.  Dispense: 90 tablet; Refill: 1    5. Night sweats  -States she has developed some night sweats since starting oral birth control and is going to seek out new GYN.   -Advised her to report back to office for workup of night sweats if changing oral contraception does not resolve this concern          The following social determinates of health impact the patient's medical decision making: No social determinates of health were factored in to today's visit.     Follow Up  Return in about 6 months (around 7/11/2023) for Annual physical.    Patient/family had no further questions at this time and verbalized understanding of the plan discussed today.

## 2023-04-03 ENCOUNTER — OFFICE VISIT (OUTPATIENT)
Dept: INTERNAL MEDICINE | Facility: CLINIC | Age: 28
End: 2023-04-03
Payer: COMMERCIAL

## 2023-04-03 VITALS
TEMPERATURE: 98 F | OXYGEN SATURATION: 98 % | HEART RATE: 68 BPM | HEIGHT: 66 IN | WEIGHT: 182 LBS | BODY MASS INDEX: 29.25 KG/M2 | SYSTOLIC BLOOD PRESSURE: 120 MMHG | DIASTOLIC BLOOD PRESSURE: 72 MMHG

## 2023-04-03 DIAGNOSIS — R11.2 NAUSEA AND VOMITING, UNSPECIFIED VOMITING TYPE: Primary | ICD-10-CM

## 2023-04-03 LAB
B-HCG UR QL: NEGATIVE
EXPIRATION DATE: NORMAL
INTERNAL NEGATIVE CONTROL: NEGATIVE
INTERNAL POSITIVE CONTROL: NORMAL
Lab: NORMAL

## 2023-04-03 PROCEDURE — 99213 OFFICE O/P EST LOW 20 MIN: CPT | Performed by: STUDENT IN AN ORGANIZED HEALTH CARE EDUCATION/TRAINING PROGRAM

## 2023-04-03 PROCEDURE — 81025 URINE PREGNANCY TEST: CPT | Performed by: STUDENT IN AN ORGANIZED HEALTH CARE EDUCATION/TRAINING PROGRAM

## 2023-04-03 RX ORDER — MINOCYCLINE HYDROCHLORIDE 100 MG/1
CAPSULE ORAL
COMMUNITY
Start: 2023-03-25

## 2023-04-03 RX ORDER — SULFACETAMIDE SODIUM 100 MG/ML
LOTION TOPICAL
COMMUNITY
Start: 2023-02-01

## 2023-04-03 RX ORDER — DESVENLAFAXINE SUCCINATE 50 MG/1
50 TABLET, EXTENDED RELEASE ORAL DAILY
COMMUNITY
Start: 2023-03-25

## 2023-04-03 NOTE — PROGRESS NOTES
"  Aryan Naik D.O.  Internal Medicine  [facility]  [address], [address]  [place] [address]  [phone]      Chief Complaint  Vomiting (Since Thursday or Friday)    SUBJECTIVE    History of Present Illness    Taty Chacko is a 28 y.o. female who presents to the office today as an established patient that last saw me on 1/11/2023.     Here today for acute care visit.     Patient reports vomiting since Thursday or Friday last week. She vomited 10 times yesterday, 6 times this morning. There is no blood in the vomit but is just yellowish like \"bile\". There is an associated nausea but no abdominal pain. She feels sore in her back from dry heaving so much. Her appetite has been down and she is eating less overall. No fever or chills. Had one episode of diarrhea yesterday that was nonbloody but states this is not uncommon for her since she had her gallbladder taken out. States right before vomiting she will get a hot flash. She has been taking Zofran from her previous cholecystectomy and it has helped. She feels \"worn out\" but denies muscle aches and pains.   States her Pristiq has given her dizzy spells and she has been on that for around a month.   She has been off birth control around 1 month, she has been sexually active since then. She took an at home pregnancy test that was negative. First day of last menstrual period was March 21st 2023.     Allergies   Allergen Reactions   • Hydrocodone Itching and Rash        Outpatient Medications Marked as Taking for the 4/3/23 encounter (Office Visit) with Aryan Naik, DO   Medication Sig Dispense Refill   • Adderall XR 10 MG 24 hr capsule 1 capsule Daily As Needed     • hydrOXYzine (ATARAX) 25 MG tablet TAKE 1-2 TABLETS BY MOUTH DAILY AS NEEDED FOR ANXIETY. 30 tablet 3   • minocycline (MINOCIN,DYNACIN) 100 MG capsule      • Multiple Vitamins-Minerals (MULTIVITAMIN ADULT PO) Take 1 tablet by mouth Daily.     • pantoprazole (PROTONIX) 40 MG " "EC tablet Take 1 tablet by mouth Daily. 90 tablet 1   • Sulfacetamide Sodium, Acne, 10 % lotion APPLY A SMALL AMOUNT TO SKIN TWICE A DAY APPLY TO ACNE-PRONE AREAS TWICE DAILY.     • ubrogepant 100 MG tablet Take one tablet by mouth at the onset of migraine. May repeat in 2 hours if needed. Do not exceed 2 tablets in 24 hours. 16 tablet 5        Past Medical History:   Diagnosis Date   • Acne    • Anxiety    • Arthritis    • GERD (gastroesophageal reflux disease)    • Kidney infection    • Kidney stone    • MCL sprain of left knee    • Migraine    • Thyroid nodule     colloid cysts   • Torn meniscus     right   • UTI (urinary tract infection)        OBJECTIVE    Vital Signs:   /72   Pulse 68   Temp 98 °F (36.7 °C) (Infrared)   Ht 167.6 cm (66\")   Wt 82.6 kg (182 lb)   SpO2 98%   BMI 29.38 kg/m²     Physical Exam  Vitals reviewed.   Constitutional:       General: She is not in acute distress.     Appearance: Normal appearance. She is not ill-appearing.   HENT:      Head: Normocephalic and atraumatic.   Eyes:      General: No scleral icterus.  Pulmonary:      Effort: Pulmonary effort is normal. No respiratory distress.   Abdominal:      General: Bowel sounds are normal. There is no distension.      Palpations: Abdomen is soft.      Tenderness: There is no abdominal tenderness. There is no guarding.   Musculoskeletal:      Right lower leg: No edema.      Left lower leg: No edema.   Skin:     Coloration: Skin is not jaundiced.   Neurological:      Mental Status: She is alert.   Psychiatric:         Mood and Affect: Mood normal.         Behavior: Behavior normal.         Thought Content: Thought content normal.                             ASSESSMENT & PLAN     Diagnoses and all orders for this visit:    1. Vomiting, unspecified vomiting type, unspecified whether nausea present (Primary)  -pt presents to office today for acute care visit   -she has had 4 days of nausea and vomiting without significant diarrhea " nor abdominal pain  -overall appetite is decreased  -she reports starting new medication (Pristiq) from her psychiatric NP around 3-4 weeks ago; she has already been experiencing dizziness side effects from this new medication  -urine pregnancy test is negative in office today  -physical exam is as above and is overall benign  -at this point I believe her symptoms are related to viral gastroenteritis vs medication side effect; if from viral gastroenteritis she should experience relief with Pepto Bismol plus BRAT diet and Pedialyte fluid replacement plus time. If no improvement after 1 week of total symptoms, I would like for her to discuss coming off Pristiq with her psychiatric NP before we expand vomiting evaluation .        -of course if abdominal pain or other symptoms suddenly worsen she should return to the office or seek out ER evaluation           The following social determinates of health impact the patient's medical decision making: No social determinates of health were factored in to today's visit.     Follow Up  No follow-ups on file.    Patient/family had no further questions at this time and verbalized understanding of the plan discussed today.

## 2023-04-03 NOTE — LETTER
April 3, 2023     Patient: Taty Chacko   YOB: 1995   Date of Visit: 4/3/2023       To Whom It May Concern:    It is my medical opinion that Taty Chacko may return to work 4/4/2023. She was seen in my office on 4/3/23.         Sincerely,        Aryan Naik DO    CC: No Recipients

## 2024-01-03 ENCOUNTER — TELEPHONE (OUTPATIENT)
Dept: INTERNAL MEDICINE | Facility: CLINIC | Age: 29
End: 2024-01-03

## 2024-01-03 ENCOUNTER — OFFICE VISIT (OUTPATIENT)
Dept: INTERNAL MEDICINE | Facility: CLINIC | Age: 29
End: 2024-01-03
Payer: COMMERCIAL

## 2024-01-03 VITALS
HEART RATE: 86 BPM | DIASTOLIC BLOOD PRESSURE: 70 MMHG | SYSTOLIC BLOOD PRESSURE: 100 MMHG | OXYGEN SATURATION: 100 % | WEIGHT: 183 LBS | BODY MASS INDEX: 29.41 KG/M2 | TEMPERATURE: 98.3 F | HEIGHT: 66 IN

## 2024-01-03 DIAGNOSIS — J20.9 ACUTE BRONCHITIS, UNSPECIFIED ORGANISM: ICD-10-CM

## 2024-01-03 DIAGNOSIS — J01.00 ACUTE NON-RECURRENT MAXILLARY SINUSITIS: ICD-10-CM

## 2024-01-03 DIAGNOSIS — J01.00 ACUTE NON-RECURRENT MAXILLARY SINUSITIS: Primary | ICD-10-CM

## 2024-01-03 PROCEDURE — 99213 OFFICE O/P EST LOW 20 MIN: CPT | Performed by: STUDENT IN AN ORGANIZED HEALTH CARE EDUCATION/TRAINING PROGRAM

## 2024-01-03 RX ORDER — ALBUTEROL SULFATE 90 UG/1
2 AEROSOL, METERED RESPIRATORY (INHALATION) EVERY 4 HOURS PRN
Qty: 18 G | Refills: 0 | Status: SHIPPED | OUTPATIENT
Start: 2024-01-03

## 2024-01-03 RX ORDER — AMOXICILLIN AND CLAVULANATE POTASSIUM 875; 125 MG/1; MG/1
1 TABLET, FILM COATED ORAL EVERY 12 HOURS SCHEDULED
Qty: 10 TABLET | Refills: 0 | Status: SHIPPED | OUTPATIENT
Start: 2024-01-03 | End: 2024-01-03 | Stop reason: SDUPTHER

## 2024-01-03 RX ORDER — AMOXICILLIN AND CLAVULANATE POTASSIUM 875; 125 MG/1; MG/1
1 TABLET, FILM COATED ORAL EVERY 12 HOURS SCHEDULED
Qty: 10 TABLET | Refills: 0 | Status: SHIPPED | OUTPATIENT
Start: 2024-01-03 | End: 2024-01-08

## 2024-01-03 RX ORDER — ALBUTEROL SULFATE 90 UG/1
2 AEROSOL, METERED RESPIRATORY (INHALATION) EVERY 4 HOURS PRN
Qty: 18 G | Refills: 0 | Status: SHIPPED | OUTPATIENT
Start: 2024-01-03 | End: 2024-01-03 | Stop reason: SDUPTHER

## 2024-01-03 NOTE — PROGRESS NOTES
Aryan Naik D.O.  Internal Medicine  [facility]  [address], [address]  [place] [address]  [phone]      Chief Complaint  Cough (Started 12/21/23)    SUBJECTIVE    Cough      Taty Chacko is a 28 y.o. female who presents to the office today as an established patient that last saw me on 7/13/2023.     Here today for urgent care follow up. Seen at [facility] urgent care 12/24/23 for  congestion, cough, ear pain, facial pain, fatigue, rhinorrhea and sore throat  of 2 days duration. She had a COVID 19 test and flu a/b test that was negative. She was diagnosed with viral URI and prescribed flonase nasal spray as well as promethazine-dextromethorphan (PROMETHAZINE-DM).   She states last day of fever was 5 days ago. She is still coughing a lot. When she lays down she feels some chest tightness and worsened cough.   She is taking Tylenol cold and flu as well as an over the counter cough syrup. Her other symptoms have resolved aside from pressure under both eyes and green nasal discharge.     Allergies   Allergen Reactions    Hydrocodone Itching and Rash        Outpatient Medications Marked as Taking for the 1/3/24 encounter (Office Visit) with Aryan Naik, DO   Medication Sig Dispense Refill    Adderall XR 10 MG 24 hr capsule 1 capsule Daily As Needed      baclofen (LIORESAL) 10 MG tablet Take 1 tablet by mouth 2 (Two) Times a Day.      clonazePAM (KlonoPIN) 0.5 MG tablet Take 1 tablet by mouth Daily As Needed.      FLUoxetine (PROzac) 20 MG tablet Take 1 tablet by mouth Daily.      fluticasone (FLONASE) 50 MCG/ACT nasal spray 2 sprays into the nostril(s) as directed by provider Daily for 30 days. 16 g 0    minocycline (MINOCIN,DYNACIN) 100 MG capsule       Multiple Vitamins-Minerals (MULTIVITAMIN ADULT PO) Take 1 tablet by mouth Daily.      pantoprazole (PROTONIX) 40 MG EC tablet Take 1 tablet by mouth Daily. 90 tablet 1    Sulfacetamide Sodium, Acne, 10 % lotion APPLY A SMALL AMOUNT TO  "SKIN TWICE A DAY APPLY TO ACNE-PRONE AREAS TWICE DAILY.      ubrogepant 100 MG tablet Take one tablet by mouth at the onset of migraine. May repeat in 2 hours if needed. Do not exceed 2 tablets in 24 hours. 16 tablet 5        Past Medical History:   Diagnosis Date    Acne     ADHD     Ankle sprain 2023    left    Anxiety     Arthritis     GERD (gastroesophageal reflux disease)     Hyperlipidemia     Kidney infection     Kidney stone     MCL sprain of left knee     Migraine     OCD (obsessive compulsive disorder)     Thyroid nodule     colloid cysts    Torn meniscus     right    UTI (urinary tract infection)        OBJECTIVE    Vital Signs:   /70   Pulse 86   Temp 98.3 °F (36.8 °C) (Oral)   Ht 167.6 cm (65.98\")   Wt 83 kg (183 lb)   SpO2 100%   BMI 29.55 kg/m²     Physical Exam  Vitals reviewed.   Constitutional:       General: She is not in acute distress.     Appearance: Normal appearance. She is not ill-appearing.   HENT:      Head:      Comments: Tenderness to percussion b/l maxillary sinuses      Right Ear: Tympanic membrane, ear canal and external ear normal. There is no impacted cerumen.      Left Ear: Tympanic membrane, ear canal and external ear normal. There is no impacted cerumen.      Mouth/Throat:      Mouth: Mucous membranes are moist.      Pharynx: Oropharynx is clear. Posterior oropharyngeal erythema (posterior pharynx erythema and petechiae) present. No oropharyngeal exudate.   Eyes:      General: No scleral icterus.  Cardiovascular:      Rate and Rhythm: Normal rate and regular rhythm.      Heart sounds: Normal heart sounds. No murmur heard.  Pulmonary:      Effort: Pulmonary effort is normal. No respiratory distress.      Breath sounds: Normal breath sounds. No stridor. No wheezing or rhonchi.   Lymphadenopathy:      Cervical: No cervical adenopathy.   Skin:     Coloration: Skin is not jaundiced.   Neurological:      Mental Status: She is alert.   Psychiatric:         Mood and Affect: " Mood normal.         Behavior: Behavior normal.         Thought Content: Thought content normal.                             ASSESSMENT & PLAN     Diagnoses and all orders for this visit:    1. Acute non-recurrent maxillary sinusitis (Primary)  2. Acute bronchitis, unspecified organism  -Patient presents to office for follow-up from urgent care.  She was initially ill on 12/22/2023 with signs and symptoms consistent with viral upper respiratory infection .   She had negative COVID and flu test at urgent care which I reviewed.  I also reviewed the progress note from her urgent care visit.  She was prescribed cough medication as well as Flonase nasal spray.  Unfortunately she continues to have cough as well as profuse green nasal discharge and maxillary sinus pressure.  She remains afebrile for the last 5 days.  -Exam as above, overall consistent with tenderness to percussion of bilateral maxillary sinuses as well as posterior pharynx erythema.  Lung sounds are clear.  -She is normotensive, afebrile, satting 100% on room air.  -At this point I believe she has a combination of acute bronchitis that will resolve over time and is the primary cause of her cough.  She can continue over-the-counter cough medications for this.  I will also prescribe her an albuterol inhaler for as needed chest tightness for potential reactive airway component.  In addition I do believe she has maxillary sinusitis I would like to treat her with a 5-day course of Augmentin.  She was cautioned about the possibility for diarrhea and I recommend she use an over-the-counter probiotic or eat yogurt.  She should return to office if no improvement in 5 days or getting worse at any time.  -     amoxicillin-clavulanate (AUGMENTIN) 875-125 MG per tablet; Take 1 tablet by mouth Every 12 (Twelve) Hours for 5 days.  Dispense: 10 tablet; Refill: 0  -     albuterol sulfate  (90 Base) MCG/ACT inhaler; Inhale 2 puffs Every 4 (Four) Hours As Needed for  Wheezing.  Dispense: 18 g; Refill: 0            The following social determinates of health impact the patient's medical decision making: No social determinates of health were factored in to today's visit.     Follow Up  No follow-ups on file.    Patient/family had no further questions at this time and verbalized understanding of the plan discussed today.

## 2024-01-03 NOTE — TELEPHONE ENCOUNTER
Caller: Taty Chacko    Relationship: Self    Best call back number: [phone]       What was the call regarding: PATIENT IS REQUESTING   albuterol sulfate  (90 Base) MCG/ACT inhaler   amoxicillin-clavulanate (AUGMENTIN) 875-125 MG per tablet BE MOVED TO PHARMACY [facility] [id number] AT [facility] [address]  - [phone]  - [phone]  [phone]

## 2024-03-14 ENCOUNTER — TELEPHONE (OUTPATIENT)
Dept: INTERNAL MEDICINE | Facility: CLINIC | Age: 29
End: 2024-03-14
Payer: COMMERCIAL

## 2024-03-14 NOTE — TELEPHONE ENCOUNTER
Patient called and states she is on day 4 of a migraine. She has been rotating Ibuprofen and tylenol. She wants to know if Dr. Naik has any advise to what she could do to help her symptoms resolve. I went ahead and scheduled her for tomorrow 03/15/24 at 9:00. Please call patient at [phone] and let her know if you have any suggestions and if you still want her to come in for the office visit scheduled.

## 2024-04-10 ENCOUNTER — SPECIALTY PHARMACY (OUTPATIENT)
Dept: NEUROLOGY | Facility: CLINIC | Age: 29
End: 2024-04-10
Payer: COMMERCIAL

## 2024-04-10 ENCOUNTER — OFFICE VISIT (OUTPATIENT)
Dept: NEUROLOGY | Facility: CLINIC | Age: 29
End: 2024-04-10
Payer: COMMERCIAL

## 2024-04-10 VITALS
SYSTOLIC BLOOD PRESSURE: 104 MMHG | HEIGHT: 66 IN | WEIGHT: 182 LBS | HEART RATE: 60 BPM | DIASTOLIC BLOOD PRESSURE: 62 MMHG | OXYGEN SATURATION: 98 % | BODY MASS INDEX: 29.25 KG/M2 | RESPIRATION RATE: 20 BRPM

## 2024-04-10 DIAGNOSIS — G43.001 MIGRAINE WITHOUT AURA AND WITH STATUS MIGRAINOSUS, NOT INTRACTABLE: Primary | ICD-10-CM

## 2024-04-10 PROCEDURE — 99214 OFFICE O/P EST MOD 30 MIN: CPT | Performed by: NURSE PRACTITIONER

## 2024-04-10 RX ORDER — LISDEXAMFETAMINE DIMESYLATE CAPSULES 30 MG/1
CAPSULE ORAL
COMMUNITY
Start: 2024-01-12

## 2024-04-10 RX ORDER — DEXTROAMPHETAMINE SACCHARATE, AMPHETAMINE ASPARTATE, DEXTROAMPHETAMINE SULFATE AND AMPHETAMINE SULFATE 1.25; 1.25; 1.25; 1.25 MG/1; MG/1; MG/1; MG/1
TABLET ORAL
COMMUNITY

## 2024-04-10 RX ORDER — ACETAMINOPHEN 325 MG/1
TABLET ORAL
COMMUNITY
Start: 2023-11-29

## 2024-04-10 NOTE — PROGRESS NOTES
Subjective   Taty Chacko is a 29 y.o. female presenting for follow up for migraine headaches. She was previously on Emgality and Ubrelvy. These were working well for her, however when the prescriptions ran out she stopped taking them.  Her headaches were previously occurring about 1-2 times per month.  Over the last 3 months they have increased in frequency and she is now having 1 about 1-2 times per week.  The pain almost always occurs behind her eyes.  It will start in the back of her head and then moved behind the eyes.  She has a prescription for baclofen 10 mg that she takes as needed. This was initially prescribed for a vaginal muscle spasm but she says she continues to use it as needed for various  muscular pains. She is also taking Prozac for anxiety/depression along with clonazepam as needed.     She is engaged. Getting  in September in [place].     History of Present Illness     Review of Systems    I have reviewed and confirmed the accuracy of the patient's history: Chief complaint, HPI, ROS, Subjective, and Past Family Social History as entered by the MA/MAURIZION/RN. Shweta Edwards MA 04/10/24      Objective     Physical Examination:  General Appearance:  Well developed, well nourished, well groomed, alert, and cooperative.  HEENT: Normocephalic.    Neck and Spine: Normal range of motion.  Normal alignment. No mass or tenderness. No bruits.  Cardiac: Regular rate and rhythm. No murmurs.  Peripheral Vasculature: Radial and pedal pulses are equal and symmetric.   Extremities: No edema or deformities. Normal joint ROM.  Skin: No rashes or birth marks.      Neurological examination:   Higher Integrative Function: Oriented to time, place, and person. Normal registration, recall attention span and concentration. Normal language including comprehension, spontaneous speech, repetition, reading, writing, naming and vocabulary. No neglect with normal visual-spatial function and construction. Normal fund of  knowledge and higher integrative function.   CN II: Pupils are equal, round and reactive to light. Normal visual acuity and visual fields.   CN III IV VI: Extraocular movements are full without nystagmus.   CN V: Normal facial sensation and strength of muscles of mastication.   CN VII: Facial movements are symmetric. No weakness.   CN VIII: Auditory acuity is normal.  CN IX & X: Symmetric palatal movement.   CN XI: Sternocleidomastoid and trapezius are normal. No weakness.   CN XII: The tongue is midline. No atrophy or fasciculations.  Motor: Normal muscle strength, bulk and tone in upper and lower extremities. No fasciculations, rigidity, spasticity, or abnormal movements.   Reflexes: 2+ in the upper and lower extremities. Plantar responses are flexor.   Sensation: Normal light touch, pinprick, vibration, temperature, and proprioception in the arms and legs.   Station and Gait: Normal gait and station.   Coordination: Finger to nose test shows no dysmetria. Rapid alternating movements are normal. Heel to shin is normal.           Assessment & Plan   Diagnoses and all orders for this visit:    1. Migraine without aura and with status migrainosus, not intractable (Primary)       Given that she may become pregnant in the near future I did not recommend restarting Emgality. She is going to try Nurtec 75 mg. She can use this every other day or once daily as needed depending on the frequency of her headaches. Side effects reviewed. She will call in a few weeks I this is not helpful. We discussed other options, including a different muscle relaxer like tizanidine. I would not recommend a beta blocker given that her BP today was 104/62 and her HR was 60. I would not recommend treatment with an additional antidepressant since she is already on Prozac and may become pregnant.  She knows that if she does become pregnant she needs to stop the medication.  If this does work well for her she can follow-up annually.    I spent 30  minutes caring for patient on todays date of service. This time includes time spent by me in the following activities: obtaining and/or reviewing a separately obtained history, performing a medically appropriate examination and/or evaluation, counseling and educating the patient on diagnosis and treatment, ordering medications, tests, or procedures, referring and communicating with other health care professionals and documenting information in the medical record.

## 2024-04-12 ENCOUNTER — SPECIALTY PHARMACY (OUTPATIENT)
Dept: NEUROLOGY | Facility: CLINIC | Age: 29
End: 2024-04-12
Payer: COMMERCIAL

## 2024-07-16 NOTE — PROGRESS NOTES
"  Aryan Naik D.O.  Internal Medicine  [facility]  [address], [address]  [place] [address]  [phone]    Chief Complaint  Annual checkup    HISTORY    Taty Chacko is a 29 y.o. female who presents to the office today as a  an established patient for their annual preventative exam.     No hospitalization(s) within the last year.     Current exercise regimen: she runs 2 days weekly and lifting weights 2 days weekly.     Status of chronic medical conditions:    Hyperlipidemia: states she has been trying to eat better \"not perfect by any means\" but has cut out a lot of fast foods. She cooks at home almost every night.  Lab Results   Component Value Date    CHLPL 191 07/13/2023    TRIG 124 07/13/2023    HDL 47 07/13/2023     (H) 07/13/2023       Acne: taking minocycline 100 mg twice daily, prescribed by [facility].       Migraines: pt states she has a family history of migraines, she sees a neurologist here with [facility]. She is now on Nurtec. States this does help.      Anxiety/ADHD/OCD: Anxiety was previously managed by me but she is now [facility] . Now taking Prozac 40 mg daily , clonazepam 0.5 mg daily as needed. States she is taking clonazepam almost daily due to work stressors and gearing up for her wedding. For ADHD takes  adderall XR 10 mg daily but doesn't take it every day and also has an as needed adderall immediate release 5 mg .      GERD: prescribed pantoprazole 40 mg daily      Patient's overall comments about their health or any other specific health concerns they report: none     GYN History:      *Patient's GYN Practice: [facility]     *regular periods      *no method at present time, not currently trying to get pregnant      *Previous pregnancies: 0.  Live births: 0.  Miscarriages: 0 . Elective abortions: 0.      Health Maintenance Summary            Overdue - BMI FOLLOWUP (Yearly) Overdue since 2/9/2022      " 02/09/2021  Registry Metric: BMI Follow-up              Overdue - PAP SMEAR (Every 3 Years) Overdue since 8/13/2023 08/13/2020  Outside Procedure: VT CYTOPATH CERV/VAG THIN LAYER              Ordered - LIPID PANEL (Yearly) Ordered on 7/17/2024 07/13/2023  Lipid Panel With LDL/HDL Ratio    02/14/2019  LIPID PANEL              INFLUENZA VACCINE (Yearly - August to March) Next due on 8/1/2024 11/01/2022  Imm Admin: Influenza, Unspecified    11/04/2020  Imm Admin: flucelvax quad pfs =>4 YRS    10/12/2020  Imm Admin: Influenza, Unspecified    10/29/2019  Imm Admin: Flu Vaccine Quad PF >36MO    01/22/2014  Imm Admin: FluMist 2-49yrs    Only the first 5 history entries have been loaded, but more history exists.              ANNUAL PHYSICAL (Yearly) Next due on 7/17/2025 07/17/2024  Done    07/13/2023  Done    06/24/2022  Done              TDAP/TD VACCINES (2 - Tdap) Next due on 12/1/2032 12/01/2022  Imm Admin: Td    02/26/2016  Imm Admin: Td, Not Adsorbed              HEPATITIS C SCREENING  Completed      02/03/2022  Hep C Virus Ab component of Hepatitis C antibody              COVID-19 Vaccine (Series Information) Completed      11/29/2023  Outside Immunization: COVID-19 (PFR) 12+yrs    02/13/2021  Imm Admin: COVID-19 (PFIZER) Purple Cap Monovalent    01/23/2021  Imm Admin: COVID-19 (PFIZER) Purple Cap Monovalent              Pneumococcal Vaccine 0-64 (Series Information) Aged Out      No completion history exists for this topic.                     Allergies   Allergen Reactions    Hydrocodone Itching and Rash        Outpatient Medications Marked as Taking for the 7/17/24 encounter (Office Visit) with Aryan Naik DO   Medication Sig Dispense Refill    acetaminophen (Tylenol) 325 MG tablet 2 tab(s) orally every 4 hours as needed for body aches, fever      amphetamine-dextroamphetamine (ADDERALL) 5 MG tablet       amphetamine-dextroamphetamine XR (ADDERALL XR) 10 MG 24 hr capsule Take 1 capsule by  mouth Every Morning      baclofen (LIORESAL) 10 MG tablet Take 1 tablet by mouth 2 (Two) Times a Day As Needed for Muscle Spasms.      clonazePAM (KlonoPIN) 0.5 MG tablet Take 1 tablet by mouth Daily As Needed.      FLUoxetine (PROzac) 40 MG capsule Take 1 capsule by mouth Daily.      minocycline (MINOCIN,DYNACIN) 100 MG capsule Take 1 capsule by mouth 2 (Two) Times a Day.      Multiple Vitamins-Minerals (MULTIVITAMIN ADULT PO) Take 1 tablet by mouth Daily.      pantoprazole (PROTONIX) 40 MG EC tablet Take 1 tablet by mouth Daily. 90 tablet 3    Rimegepant Sulfate (NURTEC) 75 MG tablet dispersible tablet Take 1 tablet by mouth 1 (One) Time As Needed (Migraine). Max of 1 tablet in 24 hours. 16 tablet 3    Sulfacetamide Sodium, Acne, 10 % lotion APPLY A SMALL AMOUNT TO SKIN TWICE A DAY APPLY TO ACNE-PRONE AREAS TWICE DAILY.      [DISCONTINUED] pantoprazole (PROTONIX) 40 MG EC tablet Take 1 tablet by mouth Daily. 90 tablet 1        Past Medical History:   Diagnosis Date    Acne     ADHD     Ankle sprain 2023    left    Anxiety     Arthritis     GERD (gastroesophageal reflux disease)     Hyperlipidemia     Kidney infection     Kidney stone     MCL sprain of left knee     Migraine     OCD (obsessive compulsive disorder)     Thyroid nodule     colloid cysts    Torn meniscus     right    UTI (urinary tract infection)      Past Surgical History:   Procedure Laterality Date    CHOLECYSTECTOMY WITH INTRAOPERATIVE CHOLANGIOGRAM N/A 03/10/2022    Procedure: Laparoscopic cholecystectomy with cholangiogram;  Surgeon: Bushra Loyola MD;  [place] [facility] [place] [facility]  Service: General;  Laterality: N/A;    KNEE ARTHROSCOPY Right 12/16/2020    Procedure: RIGHT KNEE ARTHROSCOPY, DEBRIDEMENT OF PATELLA;  Surgeon: Raissa Hodges MD;  [place] [facility] [place] [facility];  Service: Orthopedics;  Laterality: Right;     Family History   Problem Relation Age of Onset    Asthma Mother     Other Mother         Hx of colon polyps    Ovarian cysts Mother   "   Anxiety disorder Mother     Depression Mother     Hypertension Mother     Hypertension Father     Arthritis Father     Hyperlipidemia Father     Aortic aneurysm Father     Diabetes Sister     Anxiety disorder Brother     Depression Brother     Other Brother         colon polyps    Stroke Paternal Grandfather     Colon cancer Maternal Uncle     Migraines Paternal Aunt     Romelia Hyperthermia Neg Hx     reports that she has never smoked. She has never used smokeless tobacco. She reports current alcohol use of about 2.0 - 4.0 standard drinks of alcohol per week. She reports current drug use. Drug: Marijuana.    Immunization History   Administered Date(s) Administered    COVID-19 (PFIZER) Purple Cap Monovalent 01/23/2021, 02/13/2021    DTaP, Unspecified 07/06/1999    Flu Vaccine Quad PF >36MO 10/29/2019    FluMist 2-49yrs 01/22/2014    Influenza, Unspecified 10/12/2020, 11/01/2022    MMR 07/06/1999    OPV 07/06/1999    Td (TDVAX) 12/01/2022    Td, Not Adsorbed 02/26/2016    flucelvax quad pfs =>4 YRS 11/04/2020        OBJECTIVE    Vital Signs:   /72   Pulse 53   Ht 167.6 cm (65.98\")   Wt 80.7 kg (178 lb)   SpO2 100%   BMI 28.75 kg/m²     Physical Exam  Vitals reviewed.   Constitutional:       General: She is not in acute distress.     Appearance: Normal appearance. She is not ill-appearing.   HENT:      Head: Normocephalic and atraumatic.      Right Ear: Tympanic membrane, ear canal and external ear normal. There is no impacted cerumen.      Left Ear: Tympanic membrane, ear canal and external ear normal. There is no impacted cerumen.      Mouth/Throat:      Mouth: Mucous membranes are moist.      Pharynx: No oropharyngeal exudate or posterior oropharyngeal erythema.   Eyes:      General: No scleral icterus.     Extraocular Movements: Extraocular movements intact.      Conjunctiva/sclera: Conjunctivae normal.      Pupils: Pupils are equal, round, and reactive to light.   Cardiovascular:      Rate and Rhythm: " Normal rate and regular rhythm.      Heart sounds: Normal heart sounds. No murmur heard.  Pulmonary:      Effort: Pulmonary effort is normal. No respiratory distress.      Breath sounds: Normal breath sounds. No wheezing.   Abdominal:      General: Bowel sounds are normal. There is no distension.      Palpations: Abdomen is soft.      Tenderness: There is no abdominal tenderness. There is no guarding.   Musculoskeletal:      Cervical back: Neck supple. No tenderness.      Right lower leg: No edema.      Left lower leg: No edema.   Lymphadenopathy:      Cervical: No cervical adenopathy.   Skin:     General: Skin is warm and dry.      Coloration: Skin is not jaundiced.   Neurological:      General: No focal deficit present.      Mental Status: She is alert and oriented to person, place, and time.      Cranial Nerves: No cranial nerve deficit.      Motor: No weakness.   Psychiatric:         Mood and Affect: Mood normal.         Behavior: Behavior normal.         Thought Content: Thought content normal.                           ASSESSMENT & PLAN     #Annual Preventative Health Examination   -Age and sex appropriate physical exam performed and documented. Updated past medical, family, social and surgical histories as well as allergies and care team list. Addressed care gaps listed in the medical record.  -Encouraged annual dental and vision exams as part of their overall health.  -Encouraged minimum of 30 minutes or more of exercise at a brisk walk or higher 5 days per week combined with a well-balanced diet.  -Advised that all women who are planning or capable of pregnancy take a daily supplement containing 0.4 to 0.8 mg (400 to 800 ?g) of folic acid.  -Immunizations reviewed and updated in EMR. Influenza and COVID19 recommended.  -Lipid screening:  see plan below  -Aspirin for primary or secondary prevention: Not applicable, patient is less than age 50.  -Depression and Anxiety screening: Patient has known diagnosis  "of depression and / or anxiety.  -Tobacco use screening: Conducted and addressed if indicated.   -Alcohol use screening: Conducted and addressed if indicated.   -Illicit drug screening: Conducted and addressed if indicated.   -Hypertension screening: Patient screened negative for HTN today.  -HIV screening: Patient has already received HIV screening and it was negative. Patient declined repeat screening today.   -Syphilis screening: Syphilis screening not indicated.  -Hepatitis B virus screening: Screening not indicated, not in a high-risk group.  -Hepatitis C virus screening:  Patient has already completed Hepatitis C screening. Negative screening on file.   -Colon cancer screening: Patient is less than age 45 and colon cancer screening is not indicated.  -Lung cancer screening: Patient is less than age 50, screening not indicated.  -Cervical cancer screening: last pap on our record 2020, recommended she get back in with her GYN for annual exam  -Breast cancer screening:  Breast cancer screening is not applicable at this time.  -BRCA related cancer screening:  Patient does not have a known personal or family history.   -Osteoporosis screening: Informed patient that the USPSTF recommends screening for osteoporosis with bone measurement testing to prevent osteoporotic fractures in women 65 years and older. Screening is not applicable at this time.    BMI is >= 25 and <30. (Overweight) The following options were offered after discussion;: exercise counseling/recommendations      Follow up in 1 year for annual physical exam.    Patient/family had no further questions at this time and verbalized understanding of the plan discussed today.     A problem-based visit was also conducted on the same day, see below for assessment and plan    Diagnoses and all orders for this visit:    1. Pure hypercholesterolemia (Primary)  - states she has been trying to eat better \"not perfect by any means\" but has cut out a lot of fast " foods. She cooks at home almost every night.  Lab Results   Component Value Date    CHLPL 191 07/13/2023    TRIG 124 07/13/2023    HDL 47 07/13/2023     (H) 07/13/2023     -check CMP and recheck Lipid profile given improvement in diet and exercise  -statin not indicated for primary prevention at this time     2. Hyperglycemia  -     Hemoglobin A1c              The following social determinates of health impact the patient's medical decision making: No social determinates of health were factored in to today's visit.     Follow Up  Return in about 1 year (around 7/17/2025) for Annual physical.

## 2024-07-17 ENCOUNTER — OFFICE VISIT (OUTPATIENT)
Dept: INTERNAL MEDICINE | Facility: CLINIC | Age: 29
End: 2024-07-17
Payer: COMMERCIAL

## 2024-07-17 VITALS
SYSTOLIC BLOOD PRESSURE: 106 MMHG | DIASTOLIC BLOOD PRESSURE: 72 MMHG | HEIGHT: 66 IN | HEART RATE: 53 BPM | BODY MASS INDEX: 28.61 KG/M2 | OXYGEN SATURATION: 100 % | WEIGHT: 178 LBS

## 2024-07-17 DIAGNOSIS — Z00.00 ANNUAL PHYSICAL EXAM: Primary | ICD-10-CM

## 2024-07-17 DIAGNOSIS — R73.9 HYPERGLYCEMIA: ICD-10-CM

## 2024-07-17 DIAGNOSIS — E78.00 PURE HYPERCHOLESTEROLEMIA: ICD-10-CM

## 2024-07-17 DIAGNOSIS — K21.9 GASTROESOPHAGEAL REFLUX DISEASE, UNSPECIFIED WHETHER ESOPHAGITIS PRESENT: ICD-10-CM

## 2024-07-17 LAB
ALBUMIN SERPL-MCNC: 4.5 G/DL (ref 3.5–5.2)
ALBUMIN/GLOB SERPL: 1.9 G/DL
ALP SERPL-CCNC: 107 U/L (ref 39–117)
ALT SERPL-CCNC: 18 U/L (ref 1–33)
AST SERPL-CCNC: 21 U/L (ref 1–32)
BASOPHILS # BLD AUTO: 0.06 10*3/MM3 (ref 0–0.2)
BASOPHILS NFR BLD AUTO: 0.7 % (ref 0–1.5)
BILIRUB SERPL-MCNC: <0.2 MG/DL (ref 0–1.2)
BUN SERPL-MCNC: 14 MG/DL (ref 6–20)
BUN/CREAT SERPL: 18.4 (ref 7–25)
CALCIUM SERPL-MCNC: 9.6 MG/DL (ref 8.6–10.5)
CHLORIDE SERPL-SCNC: 99 MMOL/L (ref 98–107)
CHOLEST SERPL-MCNC: 188 MG/DL (ref 0–200)
CO2 SERPL-SCNC: 27.6 MMOL/L (ref 22–29)
CREAT SERPL-MCNC: 0.76 MG/DL (ref 0.57–1)
EGFRCR SERPLBLD CKD-EPI 2021: 108.3 ML/MIN/1.73
EOSINOPHIL # BLD AUTO: 0.2 10*3/MM3 (ref 0–0.4)
EOSINOPHIL NFR BLD AUTO: 2.4 % (ref 0.3–6.2)
ERYTHROCYTE [DISTWIDTH] IN BLOOD BY AUTOMATED COUNT: 15.8 % (ref 12.3–15.4)
GLOBULIN SER CALC-MCNC: 2.4 GM/DL
GLUCOSE SERPL-MCNC: 88 MG/DL (ref 65–99)
HBA1C MFR BLD: 5.6 % (ref 4.8–5.6)
HCT VFR BLD AUTO: 38 % (ref 34–46.6)
HDLC SERPL-MCNC: 50 MG/DL (ref 40–60)
HGB BLD-MCNC: 11.9 G/DL (ref 12–15.9)
IMM GRANULOCYTES # BLD AUTO: 0.04 10*3/MM3 (ref 0–0.05)
IMM GRANULOCYTES NFR BLD AUTO: 0.5 % (ref 0–0.5)
LDLC SERPL CALC-MCNC: 117 MG/DL (ref 0–100)
LYMPHOCYTES # BLD AUTO: 1.93 10*3/MM3 (ref 0.7–3.1)
LYMPHOCYTES NFR BLD AUTO: 23.3 % (ref 19.6–45.3)
MCH RBC QN AUTO: 25.6 PG (ref 26.6–33)
MCHC RBC AUTO-ENTMCNC: 31.3 G/DL (ref 31.5–35.7)
MCV RBC AUTO: 81.9 FL (ref 79–97)
MONOCYTES # BLD AUTO: 0.79 10*3/MM3 (ref 0.1–0.9)
MONOCYTES NFR BLD AUTO: 9.5 % (ref 5–12)
NEUTROPHILS # BLD AUTO: 5.26 10*3/MM3 (ref 1.7–7)
NEUTROPHILS NFR BLD AUTO: 63.6 % (ref 42.7–76)
NRBC BLD AUTO-RTO: 0 /100 WBC (ref 0–0.2)
PLATELET # BLD AUTO: 350 10*3/MM3 (ref 140–450)
POTASSIUM SERPL-SCNC: 4.4 MMOL/L (ref 3.5–5.2)
PROT SERPL-MCNC: 6.9 G/DL (ref 6–8.5)
RBC # BLD AUTO: 4.64 10*6/MM3 (ref 3.77–5.28)
SODIUM SERPL-SCNC: 138 MMOL/L (ref 136–145)
TRIGL SERPL-MCNC: 118 MG/DL (ref 0–150)
VLDLC SERPL CALC-MCNC: 21 MG/DL (ref 5–40)
WBC # BLD AUTO: 8.28 10*3/MM3 (ref 3.4–10.8)

## 2024-07-17 PROCEDURE — 99213 OFFICE O/P EST LOW 20 MIN: CPT | Performed by: STUDENT IN AN ORGANIZED HEALTH CARE EDUCATION/TRAINING PROGRAM

## 2024-07-17 PROCEDURE — 99395 PREV VISIT EST AGE 18-39: CPT | Performed by: STUDENT IN AN ORGANIZED HEALTH CARE EDUCATION/TRAINING PROGRAM

## 2024-07-17 RX ORDER — PANTOPRAZOLE SODIUM 40 MG/1
40 TABLET, DELAYED RELEASE ORAL DAILY
Qty: 90 TABLET | Refills: 3 | Status: SHIPPED | OUTPATIENT
Start: 2024-07-17

## 2024-07-17 RX ORDER — DEXTROAMPHETAMINE SACCHARATE, AMPHETAMINE ASPARTATE MONOHYDRATE, DEXTROAMPHETAMINE SULFATE AND AMPHETAMINE SULFATE 2.5; 2.5; 2.5; 2.5 MG/1; MG/1; MG/1; MG/1
10 CAPSULE, EXTENDED RELEASE ORAL EVERY MORNING
COMMUNITY

## 2024-07-17 RX ORDER — FLUOXETINE HYDROCHLORIDE 40 MG/1
40 CAPSULE ORAL DAILY
COMMUNITY

## 2024-07-24 LAB
ALBUMIN SERPL-MCNC: 4.5 G/DL (ref 4–5)
ALP SERPL-CCNC: 113 IU/L (ref 44–121)
ALT SERPL-CCNC: 15 IU/L (ref 0–32)
AST SERPL-CCNC: 18 IU/L (ref 0–40)
BASOPHILS # BLD AUTO: 0.1 X10E3/UL (ref 0–0.2)
BASOPHILS NFR BLD AUTO: 2 %
BILIRUB SERPL-MCNC: <0.2 MG/DL (ref 0–1.2)
BUN SERPL-MCNC: 15 MG/DL (ref 6–20)
BUN/CREAT SERPL: 19 (ref 9–23)
CALCIUM SERPL-MCNC: 9.7 MG/DL (ref 8.7–10.2)
CHLORIDE SERPL-SCNC: 101 MMOL/L (ref 96–106)
CHOLEST SERPL-MCNC: 198 MG/DL (ref 100–199)
CO2 SERPL-SCNC: 22 MMOL/L (ref 20–29)
CREAT SERPL-MCNC: 0.8 MG/DL (ref 0.57–1)
EGFRCR SERPLBLD CKD-EPI 2021: 102 ML/MIN/1.73
EOSINOPHIL # BLD AUTO: 0.1 X10E3/UL (ref 0–0.4)
EOSINOPHIL NFR BLD AUTO: 3 %
ERYTHROCYTE [DISTWIDTH] IN BLOOD BY AUTOMATED COUNT: 15.9 % (ref 11.7–15.4)
GLOBULIN SER CALC-MCNC: 2.6 G/DL (ref 1.5–4.5)
GLUCOSE SERPL-MCNC: 88 MG/DL (ref 70–99)
HBA1C MFR BLD: 5.9 % (ref 4.8–5.6)
HCT VFR BLD AUTO: 39.5 % (ref 34–46.6)
HDLC SERPL-MCNC: 52 MG/DL
HGB BLD-MCNC: 12.1 G/DL (ref 11.1–15.9)
IMM GRANULOCYTES # BLD AUTO: 0.2 X10E3/UL (ref 0–0.1)
IMM GRANULOCYTES NFR BLD AUTO: 4 %
LDLC SERPL CALC-MCNC: 123 MG/DL (ref 0–99)
LYMPHOCYTES # BLD AUTO: 1.9 X10E3/UL (ref 0.7–3.1)
LYMPHOCYTES NFR BLD AUTO: 40 %
MCH RBC QN AUTO: 25.8 PG (ref 26.6–33)
MCHC RBC AUTO-ENTMCNC: 30.6 G/DL (ref 31.5–35.7)
MCV RBC AUTO: 84 FL (ref 79–97)
MONOCYTES # BLD AUTO: 0.6 X10E3/UL (ref 0.1–0.9)
MONOCYTES NFR BLD AUTO: 13 %
NEUTROPHILS # BLD AUTO: 1.8 X10E3/UL (ref 1.4–7)
NEUTROPHILS NFR BLD AUTO: 38 %
PLATELET # BLD AUTO: 259 X10E3/UL (ref 150–450)
POTASSIUM SERPL-SCNC: 4.5 MMOL/L (ref 3.5–5.2)
PROT SERPL-MCNC: 7.1 G/DL (ref 6–8.5)
RBC # BLD AUTO: 4.69 X10E6/UL (ref 3.77–5.28)
SODIUM SERPL-SCNC: 142 MMOL/L (ref 134–144)
TRIGL SERPL-MCNC: 128 MG/DL (ref 0–149)
VLDLC SERPL CALC-MCNC: 23 MG/DL (ref 5–40)
WBC # BLD AUTO: 4.7 X10E3/UL (ref 3.4–10.8)

## 2024-07-26 LAB
FERRITIN SERPL-MCNC: NORMAL NG/ML
FOLATE SERPL-MCNC: NORMAL NG/ML
IRON SERPL-MCNC: NORMAL UG/DL
RETICS/RBC NFR AUTO: NORMAL %
SPECIMEN STATUS: NORMAL
TIBC SERPL-MCNC: NORMAL MCG/DL
TSH SERPL DL<=0.005 MIU/L-ACNC: NORMAL UIU/ML
UIBC SERPL-MCNC: NORMAL UG/DL
VIT B12 SERPL-MCNC: NORMAL PG/ML
WRITTEN AUTHORIZATION: NORMAL

## 2024-09-12 ENCOUNTER — PATIENT ROUNDING (BHMG ONLY) (OUTPATIENT)
Age: 29
End: 2024-09-12
Payer: COMMERCIAL

## 2024-09-12 NOTE — ED NOTES
Thank you for letting us care for you in your recent visit to our [facility] urgent care center. We would love to hear about your experience with us. Was this the first time you have visited our location?    We’re always looking for ways to make our patients’ experiences even better. Do you have any recommendations on ways we may improve?     I appreciate you taking the time to respond. Please be on the lookout for a survey about your recent visit from [employer] via text or email. We would greatly appreciate if you could fill that out and turn it back in. We want your voice to be heard and we value your feedback.   Thank you for choosing [facility] for your healthcare needs.     If you have concerns or would like to speak to me in person regarding your visit please feel free to give me a call. [phone]    Hope you get well soon and thank you.    Tesfaye Avila LPN Practice Manager

## 2024-10-07 DIAGNOSIS — D64.9 NORMOCYTIC ANEMIA: Primary | ICD-10-CM

## 2024-10-07 LAB
BASOPHILS # BLD AUTO: 0.05 10*3/MM3 (ref 0–0.2)
BASOPHILS NFR BLD AUTO: 0.8 % (ref 0–1.5)
EOSINOPHIL # BLD AUTO: 0.22 10*3/MM3 (ref 0–0.4)
EOSINOPHIL NFR BLD AUTO: 3.5 % (ref 0.3–6.2)
ERYTHROCYTE [DISTWIDTH] IN BLOOD BY AUTOMATED COUNT: 15 % (ref 12.3–15.4)
HCT VFR BLD AUTO: 39 % (ref 34–46.6)
HGB BLD-MCNC: 11.9 G/DL (ref 12–15.9)
IMM GRANULOCYTES # BLD AUTO: 0.01 10*3/MM3 (ref 0–0.05)
IMM GRANULOCYTES NFR BLD AUTO: 0.2 % (ref 0–0.5)
LYMPHOCYTES # BLD AUTO: 2.17 10*3/MM3 (ref 0.7–3.1)
LYMPHOCYTES NFR BLD AUTO: 34.7 % (ref 19.6–45.3)
MCH RBC QN AUTO: 25.6 PG (ref 26.6–33)
MCHC RBC AUTO-ENTMCNC: 30.5 G/DL (ref 31.5–35.7)
MCV RBC AUTO: 84.1 FL (ref 79–97)
MONOCYTES # BLD AUTO: 0.83 10*3/MM3 (ref 0.1–0.9)
MONOCYTES NFR BLD AUTO: 13.3 % (ref 5–12)
NEUTROPHILS # BLD AUTO: 2.98 10*3/MM3 (ref 1.7–7)
NEUTROPHILS NFR BLD AUTO: 47.5 % (ref 42.7–76)
NRBC BLD AUTO-RTO: 0 /100 WBC (ref 0–0.2)
PLATELET # BLD AUTO: 323 10*3/MM3 (ref 140–450)
RBC # BLD AUTO: 4.64 10*6/MM3 (ref 3.77–5.28)
WBC # BLD AUTO: 6.26 10*3/MM3 (ref 3.4–10.8)

## 2024-11-28 ENCOUNTER — APPOINTMENT (OUTPATIENT)
Dept: GENERAL RADIOLOGY | Facility: HOSPITAL | Age: 29
End: 2024-11-28
Payer: COMMERCIAL

## 2024-11-28 ENCOUNTER — HOSPITAL ENCOUNTER (EMERGENCY)
Facility: HOSPITAL | Age: 29
Discharge: HOME | End: 2024-11-28
Attending: EMERGENCY MEDICINE
Payer: COMMERCIAL

## 2024-11-28 VITALS
DIASTOLIC BLOOD PRESSURE: 77 MMHG | HEART RATE: 66 BPM | SYSTOLIC BLOOD PRESSURE: 115 MMHG | TEMPERATURE: 97.6 F | RESPIRATION RATE: 16 BRPM | OXYGEN SATURATION: 100 %

## 2024-11-28 DIAGNOSIS — S82.65XA CLOSED NONDISPLACED FRACTURE OF LATERAL MALLEOLUS OF LEFT FIBULA, INITIAL ENCOUNTER: Primary | ICD-10-CM

## 2024-11-28 PROCEDURE — 99283 EMERGENCY DEPT VISIT LOW MDM: CPT

## 2024-11-28 PROCEDURE — 73610 X-RAY EXAM OF ANKLE: CPT

## 2024-11-28 NOTE — ED NOTES
Patient to ER via car from home for trip and fall complains of L ankle pain     No head injury no blood thinners

## 2024-11-28 NOTE — Clinical Note
[facility] EMERGENCY DEPARTMENT  [address]  [facility] [address]  Phone: [phone]    Taty Chacko was seen and treated in our emergency department on 11/28/2024.  She may return to work on 12/02/2024.  The patient is to be non-weight bearing on her left ankle until cleared by her orthopedic surgeon.       Thank you for choosing [facility].    Raul Vuong MD

## 2024-11-28 NOTE — Clinical Note
[facility] EMERGENCY DEPARTMENT  [address]  [facility] [address]  Phone: [phone]    Taty Chacko was seen and treated in our emergency department on 11/28/2024.  She may return to work on 12/02/2024.  The patient is to be non-weight bearing on her left ankle until cleared by her orthopedic surgeon.       Thank you for choosing [facility].    Raul Vuong MD

## 2024-11-28 NOTE — Clinical Note
[facility] EMERGENCY DEPARTMENT  [address]  [facility] [address]  Phone: [phone]    Taty Chacko was seen and treated in our emergency department on 11/28/2024.  She may return to work on 12/02/2024.  The patient is to be non-weight bearing on her left ankle until cleared by her orthopedic surgeon.       Thank you for choosing [facility].    Raul Vuong MD

## 2024-11-29 NOTE — ED PROVIDER NOTES
EMERGENCY DEPARTMENT ENCOUNTER    Room Number:  01/01  Date seen:  11/28/2024  PCP: Aryan Naik DO    Spoken Language:  English  Language interpretation services not needed     HPI:  Chief Complaint: left ankle pain    A complete HPI/ROS/PMH/PSH/SH/FH are unobtainable due to: n/a    Context: Taty Chacko is a 29 y.o. female presenting to the emergency department complaining of left ankle pain.  The history is being obtained by the patient and by review of the medical chart.  The patient states that she was stepping down off of a deck earlier today when she accidentally rolled her left ankle.  She states that she has sprained this ankle in has seen Dr. Oconnor in the past.  She presents to the emergency department complaining of pain located at the base of the lateral malleolus.  She also complains of pain on the dorsal aspect of the foot.  She states that she has had difficulty bearing any weight on the left leg since injuring her ankle.  She denies being on any antiplatelet or anticoagulant therapy.    PAST MEDICAL HISTORY  Active Ambulatory Problems     Diagnosis Date Noted    Headache, migraine 10/18/2016    Migraine without aura with status migrainosus 10/22/2018    Chronic pain of right knee 12/10/2020    Anxiety 05/28/2019    Fatigue 08/10/2017    Thyroid dysfunction 05/28/2019    Pain of upper abdomen 01/03/2022    Dyspepsia 01/03/2022    Family history of polyps in the colon 01/03/2022    Gallbladder polyp 03/08/2022     Resolved Ambulatory Problems     Diagnosis Date Noted    No Resolved Ambulatory Problems     Past Medical History:   Diagnosis Date    Acne     ADHD     Ankle sprain 2023    Arthritis     GERD (gastroesophageal reflux disease)     Hyperlipidemia     Kidney infection     Kidney stone     MCL sprain of left knee     Migraine     OCD (obsessive compulsive disorder)     Thyroid nodule     Torn meniscus     UTI (urinary tract infection)        PAST SURGICAL HISTORY  Past Surgical History:    Procedure Laterality Date    CHOLECYSTECTOMY WITH INTRAOPERATIVE CHOLANGIOGRAM N/A 03/10/2022    Procedure: Laparoscopic cholecystectomy with cholangiogram;  Surgeon: Bushra Loyola MD;  Location: [facility] OR [facility] [place];  Service: General;  Laterality: N/A;    KNEE ARTHROSCOPY Right 12/16/2020    Procedure: RIGHT KNEE ARTHROSCOPY, DEBRIDEMENT OF PATELLA;  Surgeon: Raissa Hodges MD;  Location:  [facility] OR [facility] [place];  Service: Orthopedics;  Laterality: Right;       FAMILY HISTORY  Family History   Problem Relation Age of Onset    Asthma Mother     Other Mother         Hx of colon polyps    Ovarian cysts Mother     Anxiety disorder Mother     Depression Mother     Hypertension Mother     Hypertension Father     Arthritis Father     Hyperlipidemia Father     Aortic aneurysm Father     Diabetes Sister     Anxiety disorder Brother     Depression Brother     Other Brother         colon polyps    Stroke Paternal Grandfather     Colon cancer Maternal Uncle     Migraines Paternal Aunt     Malig Hyperthermia Neg Hx        SOCIAL HISTORY  Social History     Socioeconomic History    Marital status: Significant Other   Tobacco Use    Smoking status: Never     Passive exposure: Never    Smokeless tobacco: Never   Vaping Use    Vaping status: Former    Substances: Nicotine, Flavoring    Devices: Disposable   Substance and Sexual Activity    Alcohol use: Yes     Alcohol/week: 2.0 - 4.0 standard drinks of alcohol     Types: 2 - 4 Standard drinks or equivalent per week    Drug use: Yes     Types: Marijuana    Sexual activity: Defer       ALLERGIES  Hydrocodone    PHYSICAL EXAM  ED Triage Vitals   Temp Heart Rate Resp BP SpO2   11/28/24 1655 11/28/24 1655 11/28/24 1655 11/28/24 1839 11/28/24 1655   97.6 °F (36.4 °C) 76 16 114/97 98 %      Temp src Heart Rate Source Patient Position BP Location FiO2 (%)   -- -- -- -- --              Physical Exam  Constitutional:       Appearance: Normal appearance.   HENT:      Head: Normocephalic and  atraumatic.      Mouth/Throat:      Mouth: Mucous membranes are moist.   Eyes:      Extraocular Movements: Extraocular movements intact.      Pupils: Pupils are equal, round, and reactive to light.   Cardiovascular:      Rate and Rhythm: Normal rate.   Pulmonary:      Effort: Pulmonary effort is normal.   Musculoskeletal:      Cervical back: Normal range of motion and neck supple.   Skin:     Comments: Moderate swelling noted to the left ankle.  The patient is tender to palpate at the base of the left lateral malleolus.  Neurovascularly intact.   Neurological:      Mental Status: She is alert and oriented to person, place, and time. Mental status is at baseline.   Psychiatric:         Mood and Affect: Mood normal.         Behavior: Behavior normal.         Thought Content: Thought content normal.         Judgment: Judgment normal.       LAB RESULTS  No results found for this or any previous visit (from the past 24 hours).    RADIOLOGY  Imaging Results (Last 24 Hours)       Procedure Component Value Units Date/Time    XR Ankle 3+ View Left [[id number]] Collected: 11/28/24 1827     Updated: 11/28/24 1831    Narrative:      LEFT ANKLE     HISTORY: Fall, ankle pain.     COMPARISON: None.     FINDINGS: AP, lateral and oblique views of the ankle demonstrate  extensive soft tissue swelling about the lateral malleolus. A horizontal  fracture involving the inferior aspect of the lateral malleolus is noted  with mild displacement. No other fractures are identified.     This report was finalized on 11/28/2024 6:28 PM by Dr. Simon Becerra M.D on Workstation: [id number]             PROCEDURES  Procedures  None    MEDICATIONS GIVEN IN ER  Medications - No data to display    MEDICAL DECISION MAKING, PROGRESS, and CONSULTS  Vital signs and nursing notes have all been reviewed by me.    All radiology studies have been reviewed and independently interpreted by me and discussed with radiologist dictating the report.       Orders  placed during this visit:  Orders Placed This Encounter   Procedures    [facility].  Crutches; Prevents Accomplishing MRADLs; Able to Safely Use Equipment; Mobility Deficit Can Be Sufficiently Resolved By Use of Equipment    XR Ankle 3+ View Left     Differential diagnosis includes but is not limited to:  Differential diagnosis includes but is not limited to:  - contusion  - ankle sprain  - bony fracture    Discussion below represents my analysis of pertinent findings related to patient's condition, differential diagnosis, treatment plan and final disposition:    The patient's left ankle x-ray is suspicious for a fracture involving the inferior aspect of the lateral malleolus with mild displacement.  No additional fractures are seen.  There is moderate soft tissue swelling in this area.  The patient has a cam boot at home which she has previously worn for a left ankle injury.  We will give her crutches and have advised her to remain nonweightbearing, and to wear this boot until she is seen by orthopedic surgery.  She will give her orthopedic surgeon and call on Monday to make an appointment to be seen.    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  Discussed at length ED testing.      Patient discharged in stable condition.     Reviewed implications of results, diagnosis, meds, responsibility to follow up, warning signs and symptoms of possible worsening, potential complications and reasons to return to  ER.     Patient/Family voiced understanding of above instructions.     Discussed plan for discharge, as there is no emergent indication for admission. Patient referred to primary care provider for BP management due to today's BP. Pt/family is agreeable and understands need for follow up and repeat testing.  Pt is aware that discharge does not mean that nothing is wrong but it indicates no emergency is present that requires admission and they must continue care with follow-up as given below or physician of their choice.     DIAGNOSIS  Final diagnoses:   Closed nondisplaced fracture of lateral malleolus of left fibula, initial encounter       DISPOSITION  ED Disposition       ED Disposition   Discharge    Condition   Stable    Comment   --               FOLLOW UP  Kamari Oconnor Jr., MD  [address] [id number]  [phone]      Call on Monday to make an appointment to be seen      RX  Medications - No data to display       Medication List      No changes were made to your prescriptions during this visit.         Latest Documented Vital Signs:  As of 23:19 EST  BP- 115/77 HR- 66 Temp- 97.6 °F (36.4 °C) O2 sat- 100%    Provider Attestation:  I personally reviewed the past medical history, past surgical history, social history, family history, current medications and allergies as they appear in the chart. I reviewed the patient's history, physical, lab/imaging results and overall care with Dr. Vuong who is in agreement with the patient's treatment plan.    EMR Dragon/Transcription disclaimer:  Dictated using Dragon dictation    Provider note signed by:    Note Disclaimer: At [facility], we believe that sharing information builds trust and better relationships. You are receiving this note because you are receiving care at [facility] or recently visited. It is possible you will see health information before a provider has talked with you about it. This kind of information can be easy to  misunderstand. To help you fully understand what it means for your health, we urge you to discuss this note with your provider.         Lisbet Hou PA  11/28/24 3955

## 2024-11-29 NOTE — DISCHARGE INSTRUCTIONS
Although you are being discharged from the ED today, I encourage you to return for worsening symptoms. Things can, and do, change such that treatment at home with medication may not be adequate. Specifically I recommend returning for chest pain or discomfort, difficulty breathing, persistent vomiting or difficulty holding down liquids or medications, fever > 102.0 F, or any other worsening or alarming symptoms.     Rest. Drink plenty of fluids.  Follow up with Dr. Oconnor for further evaluation and management.  Follow up with primary care provider for further management and to have blood pressure rechecked.  Call [phone] to get established with a new primary care provider if you do not already have one.    Do not place weight on your left ankle until cleared by Dr. Oconnor.

## 2024-11-29 NOTE — ED PROVIDER NOTES
MD ATTESTATION NOTE  I supervised care provided by the midlevel provider. We have discussed this patient's history, physical exam, and treatment plan. I have reviewed the midlevel provider's note and I agree with the midlevel provider's findings and plan of care.   SHARED VISIT: This visit was performed by BOTH a physician and an APC. The substantive portion of the medical decision making was performed by this attesting physician who made or approved the management plan and takes responsibility for patient management. All studies in the APC note (if performed) were independently interpreted by me.   I have personally had a face to face encounter with the patient.     PCP: Aryan Naik DO  Patient Care Team:  Aryan Naik DO as PCP - General (Internal Medicine)     Taty Chacko is a 29 y.o. female who presents to the ED c/o left ankle pain.  Patient rolled her left ankle earlier today.  She has been unable to bear weight since that time.  No other injuries.  She has prior history of left ankle injury.    On exam:  General: NAD.  Head: NCAT.  ENT: nares patent, no scleral icterus  Neck: Supple, trachea midline.  Cardiac: regular rate and rhythm.  Lungs: normal effort, clear to auscultation bilaterally  Abdomen: Soft, nondistended, NTTP, no rebound tenderness, no guarding or rigidity.   Extremities: Moves all extremities well, no peripheral edema  Neuro: alert, MAEW, follows commands  Psych: calm, cooperative  Skin: Warm, dry.    Medical Decision Making:  After the initial H&P, I discussed pertinent information from history and physical exam with patient/family.  Discussed differential diagnosis.  Discussed plan for ED evaluation/work-up/treatment.  All questions answered.  Patient/family is agreeable with plan.    ED Course as of 11/28/24 1957   Thu Nov 28, 2024   1900 XR Ankle 3+ View Left  My independent interpretation of the imaging study is no dislocation [AB]      ED Course User Index  [AB] Raul Vuong MD        Diagnosis  Final diagnoses:   Closed nondisplaced fracture of lateral malleolus of left fibula, initial encounter        Raul Vuong MD  11/28/24 1957

## 2025-02-28 NOTE — PROGRESS NOTES
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 Kamari Oconnor her past medical history is listed below and reviewed by me        Allergies:   Allergies   Allergen Reactions    Hydrocodone Itching and Rash       Medications:   Home Medications:  Current Outpatient Medications on File Prior to Visit   Medication Sig    acetaminophen (Tylenol) 325 MG tablet 2 tab(s) orally every 4 hours as needed for body aches, fever    amphetamine-dextroamphetamine (ADDERALL) 5 MG tablet     amphetamine-dextroamphetamine XR (ADDERALL XR) 10 MG 24 hr capsule Take 1 capsule by mouth Every Morning    baclofen (LIORESAL) 10 MG tablet Take 1 tablet by mouth 2 (Two) Times a Day As Needed for Muscle Spasms.    cefdinir (OMNICEF) 300 MG capsule Take 1 capsule by mouth 2 (Two) Times a Day.    clonazePAM (KlonoPIN) 0.5 MG tablet Take 1 tablet by mouth Daily As Needed.    FLUoxetine (PROzac) 40 MG capsule Take 1 capsule by mouth Daily.    minocycline (MINOCIN,DYNACIN) 100 MG capsule Take 1 capsule by mouth 2 (Two) Times a Day.    Multiple Vitamins-Minerals (MULTIVITAMIN ADULT PO) Take 1 tablet by mouth Daily.    pantoprazole (PROTONIX) 40 MG EC tablet Take 1 tablet by mouth Daily.    propranolol (INDERAL) 20 MG tablet Take 1 tablet by mouth Daily.    Rimegepant Sulfate (NURTEC) 75 MG tablet dispersible tablet Take 1 tablet  by mouth 1 (One) Time As Needed (Migraine). Max of 1 tablet in 24 hours.    Sulfacetamide Sodium, Acne, 10 % lotion APPLY A SMALL AMOUNT TO SKIN TWICE A DAY APPLY TO ACNE-PRONE AREAS TWICE DAILY.     No current facility-administered medications on file prior to visit.     Current Medications:  Scheduled Meds:  Continuous Infusions:No current facility-administered medications for this visit.    PRN Meds:.    Past Medical History:   Diagnosis Date    Acne     ADHD     Ankle sprain 2023    left    Anxiety     Arthritis     GERD (gastroesophageal reflux disease)     Hyperlipidemia     Kidney infection     Kidney stone     MCL sprain of left knee     Migraine     OCD (obsessive compulsive disorder)     Thyroid nodule     colloid cysts    Torn meniscus     right    UTI (urinary tract infection)         Past Surgical History:   Procedure Laterality Date    CHOLECYSTECTOMY WITH INTRAOPERATIVE CHOLANGIOGRAM N/A 03/10/2022    Procedure: Laparoscopic cholecystectomy with cholangiogram;  Surgeon: Bushra Loyola MD;  Location: [facility] OR [facility] [place];  Service: General;  Laterality: N/A;    KNEE ARTHROSCOPY Right 12/16/2020    Procedure: RIGHT KNEE ARTHROSCOPY, DEBRIDEMENT OF PATELLA;  Surgeon: Raissa Hodges MD;  Location: [facility] OR [facility] [place];  Service: Orthopedics;  Laterality: Right;        Social History     Occupational History    Occupation: Vet Tech   Tobacco Use    Smoking status: Never     Passive exposure: Never    Smokeless tobacco: Never   Vaping Use    Vaping status: Former    Substances: Nicotine, Flavoring    Devices: Disposable   Substance and Sexual Activity    Alcohol use: Yes     Alcohol/week: 2.0 - 4.0 standard drinks of alcohol     Types: 2 - 4 Standard drinks or equivalent per week    Drug use: Yes     Types: Marijuana    Sexual activity: Defer      Social History     Social History Narrative    Not on file        Family History   Problem Relation Age of Onset    Asthma Mother     Other Mother         Hx of colon  "polyps    Ovarian cysts Mother     Anxiety disorder Mother     Depression Mother     Hypertension Mother     Hypertension Father     Arthritis Father     Hyperlipidemia Father     Aortic aneurysm Father     Diabetes Sister     Anxiety disorder Brother     Depression Brother     Other Brother         colon polyps    Stroke Paternal Grandfather     Colon cancer Maternal Uncle     Migraines Paternal Aunt     Malig Hyperthermia Neg Hx              Review of Systems:     Review of Systems      Physical Exam: 30 y.o. female  General Appearance:    Alert, cooperative, in no acute distress                   Vitals:    03/06/25 1527   Temp: 98 °F (36.7 °C)   TempSrc: Temporal   Weight: 79.3 kg (174 lb 12.8 oz)   Height: 167.6 cm (66\")   PainSc: 4    PainLoc: Knee      Patient is alert and read ×3 no acute distress appears her above-listed at height weight and age.  Affect is normal respiratory rate is normal unlabored. Heart rate regular rate rhythm, sclera, dentition and hearing are normal for the purpose of this exam.        Ortho Exam  Physical exam of the left knee reveals no effusion, no erythema.  The patient has no palpable tenderness along the medial joint line, no tenderness about the lateral joint line.  Patient does have crepitus with patellofemoral range of motion.  They also have subjective symptoms anteriorly during exam.  The patient has a negative bounce home, negative Edward and a stable ligamentous exam.  Quad tone is reasonable and symmetric.  There are no overlying skin changes no lymphedema no lymphadenopathy.  There is good hip range of motion which is full symmetric and asymptomatic and a normal ankle exam.  Hamstrings and IT band are tight bilaterally.   Large Joint Arthrocentesis: L knee  Date/Time: 3/6/2025 3:48 PM  Consent given by: patient  Site marked: site marked  Timeout: Immediately prior to procedure a time out was called to verify the correct patient, procedure, equipment, support staff " and site/side marked as required   Supporting Documentation  Indications: pain   Procedure Details  Location: knee - L knee  Preparation: Patient was prepped and draped in the usual sterile fashion  Needle gauge: 21G.  Approach: anteromedial  Medications administered: 1 mL methylPREDNISolone acetate 80 MG/ML; 2 mL lidocaine PF 1% 1 %  Patient tolerance: patient tolerated the procedure well with no immediate complications                 Radiology:   AP, Lateral and merchant views of the left knee  were ordered/reviewed to evauateknee pain.  I have no comparative films he has a tiny bit of arthritis with a small spur seen on the patella otherwise no acute pathology  Imaging Results (Most Recent)       Procedure Component Value Units Date/Time    XR Knee 3 View Left [[id number]] Resulted: 03/06/25 1522     Updated: 03/06/25 1522    Impression:      Ordering physician's impression is located in the Encounter Note dated 03/06/25. X-ray performed in the DR room.            Assessment/Plan:  Left knee pain do think this is patellofemoral I think she would benefit from an injection I will also start her on some meloxicam with strict precautions for short period of time and have her see physical therapy to really work on quad and core strengthening in the unlikely event she fails to improve with this we will pursue other means of testing                              '

## 2025-03-06 ENCOUNTER — OFFICE VISIT (OUTPATIENT)
Dept: ORTHOPEDIC SURGERY | Facility: CLINIC | Age: 30
End: 2025-03-06
Payer: COMMERCIAL

## 2025-03-06 VITALS — TEMPERATURE: 98 F | WEIGHT: 174.8 LBS | HEIGHT: 66 IN | BODY MASS INDEX: 28.09 KG/M2

## 2025-03-06 DIAGNOSIS — M25.862 PATELLOFEMORAL DYSFUNCTION OF LEFT KNEE: ICD-10-CM

## 2025-03-06 DIAGNOSIS — M25.562 LEFT KNEE PAIN, UNSPECIFIED CHRONICITY: Primary | ICD-10-CM

## 2025-03-06 RX ORDER — MELOXICAM 15 MG/1
15 TABLET ORAL DAILY
Qty: 30 TABLET | Refills: 0 | Status: SHIPPED | OUTPATIENT
Start: 2025-03-06

## 2025-03-06 RX ADMIN — LIDOCAINE HYDROCHLORIDE 2 ML: 10 INJECTION, SOLUTION EPIDURAL; INFILTRATION; INTRACAUDAL; PERINEURAL at 15:48

## 2025-03-06 RX ADMIN — METHYLPREDNISOLONE ACETATE 1 ML: 80 INJECTION, SUSPENSION INTRA-ARTICULAR; INTRALESIONAL; INTRAMUSCULAR; SOFT TISSUE at 15:48

## 2025-03-11 RX ORDER — METHYLPREDNISOLONE ACETATE 80 MG/ML
1 INJECTION, SUSPENSION INTRA-ARTICULAR; INTRALESIONAL; INTRAMUSCULAR; SOFT TISSUE
Status: COMPLETED | OUTPATIENT
Start: 2025-03-06 | End: 2025-03-06

## 2025-03-11 RX ORDER — LIDOCAINE HYDROCHLORIDE 10 MG/ML
2 INJECTION, SOLUTION EPIDURAL; INFILTRATION; INTRACAUDAL; PERINEURAL
Status: COMPLETED | OUTPATIENT
Start: 2025-03-06 | End: 2025-03-06

## 2025-04-10 ENCOUNTER — SPECIALTY PHARMACY (OUTPATIENT)
Dept: NEUROLOGY | Facility: CLINIC | Age: 30
End: 2025-04-10
Payer: COMMERCIAL

## 2025-04-10 ENCOUNTER — OFFICE VISIT (OUTPATIENT)
Dept: NEUROLOGY | Facility: CLINIC | Age: 30
End: 2025-04-10
Payer: COMMERCIAL

## 2025-04-10 VITALS
DIASTOLIC BLOOD PRESSURE: 68 MMHG | SYSTOLIC BLOOD PRESSURE: 122 MMHG | HEIGHT: 66 IN | BODY MASS INDEX: 28.28 KG/M2 | WEIGHT: 176 LBS

## 2025-04-10 DIAGNOSIS — G43.001 MIGRAINE WITHOUT AURA AND WITH STATUS MIGRAINOSUS, NOT INTRACTABLE: Primary | ICD-10-CM

## 2025-04-10 PROCEDURE — 99214 OFFICE O/P EST MOD 30 MIN: CPT | Performed by: NURSE PRACTITIONER

## 2025-04-10 RX ORDER — PROPRANOLOL HCL 20 MG
20 TABLET ORAL 2 TIMES DAILY
Qty: 180 TABLET | Refills: 3 | Status: SHIPPED | OUTPATIENT
Start: 2025-04-10

## 2025-04-10 NOTE — PROGRESS NOTES
Specialty Pharmacy Patient Management Program  Neurology Initial Assessment     Taty Chacko is a 30 y.o. female with chronic migraine seen by a Neurology provider and enrolled in the Neurology Patient Management program offered by [facility].  An initial outreach was conducted, including assessment of therapy appropriateness and specialty medication education for rimegepant (Nurtec). The patient was introduced to services offered by [facility], including: regular assessments, refill coordination, curbside pick-up or mail order delivery options, prior authorization maintenance, and financial assistance programs as applicable. The patient was also provided with contact information for the pharmacy team.     Insurance Coverage & Financial Support  Rx [employer] [facility] and copay card    Relevant Past Medical History and Comorbidities  Relevant medical history and concomitant health conditions were discussed with the patient. The patient's chart has been reviewed for relevant past medical history and comorbid health conditions and updated as necessary.   Past Medical History:   Diagnosis Date    Acne     ADHD     Ankle sprain 2023    left    Anxiety     Arthritis     GERD (gastroesophageal reflux disease)     Hyperlipidemia     Kidney infection     Kidney stone     MCL sprain of left knee     Migraine     OCD (obsessive compulsive disorder)     Thyroid nodule     colloid cysts    Torn meniscus     right    UTI (urinary tract infection)      Social History     Socioeconomic History    Marital status: Significant Other   Tobacco Use    Smoking status: Never     Passive exposure: Never    Smokeless tobacco: Never   Vaping Use    Vaping status: Former    Substances: Nicotine, Flavoring    Devices: Disposable   Substance and Sexual Activity    Alcohol use: Yes     Alcohol/week: 2.0 - 4.0 standard drinks of alcohol     Types: 2 - 4 Standard drinks or equivalent per week     Drug use: Yes     Types: Marijuana    Sexual activity: Defer     Problem list reviewed by Gerard Jacinto RPH on 4/10/2025 at  3:24 PM      Allergies  Known allergies and reactions were discussed with the patient. The patient's chart has been reviewed for  allergy information and updated as necessary.   Allergies   Allergen Reactions    Hydrocodone Itching and Rash     Allergies reviewed by Gerard Jacinto RPH on 4/10/2025 at  3:24 PM      Relevant Laboratory Values  Common labs          7/17/2024    09:00 10/7/2024    09:00   Common Labs   Glucose 88     88     BUN 14     15     Creatinine 0.76     0.80     Sodium 138     142     Potassium 4.4     4.5     Chloride 99     101     Calcium 9.6     9.7     Albumin 4.5     4.5     Total Bilirubin <0.2     <0.2     Alkaline Phosphatase 107     113     AST (SGOT) 21     18     ALT (SGPT) 18     15     WBC 8.28     4.7  6.26    Hemoglobin 11.9     12.1  11.9    Hematocrit 38.0     39.5  39.0    Platelets 350     259  323    Total Cholesterol 188     198     Triglycerides 118     128     HDL Cholesterol 50     52     LDL Cholesterol  117     123     Hemoglobin A1C 5.60     5.9         Lab Assessment  The above labs have been reviewed. No dose adjustments are needed for the specialty medication(s) based on the labs.       Current Medication List  This medication list has been reviewed with the patient and evaluated for any interactions or necessary modifications/recommendations, and updated to include all prescription medications, OTC medications, and supplements the patient is currently taking.  This list reflects what is contained in the patient's profile, which has also been marked as reviewed to communicate to other providers it is the most up to date version of the patient's current medication therapy.     Current Outpatient Medications:     acetaminophen (Tylenol) 325 MG tablet, 2 tab(s) orally every 4 hours as needed for body aches, fever, Disp: , Rfl:      amphetamine-dextroamphetamine (ADDERALL) 5 MG tablet, , Disp: , Rfl:     amphetamine-dextroamphetamine XR (ADDERALL XR) 10 MG 24 hr capsule, Take 1 capsule by mouth Every Morning, Disp: , Rfl:     baclofen (LIORESAL) 10 MG tablet, Take 1 tablet by mouth 2 (Two) Times a Day As Needed for Muscle Spasms., Disp: , Rfl:     cefdinir (OMNICEF) 300 MG capsule, Take 1 capsule by mouth 2 (Two) Times a Day., Disp: 20 capsule, Rfl: 0    clonazePAM (KlonoPIN) 0.5 MG tablet, Take 1 tablet by mouth Daily As Needed., Disp: , Rfl:     FLUoxetine (PROzac) 40 MG capsule, Take 1 capsule by mouth Daily., Disp: , Rfl:     meloxicam (MOBIC) 15 MG tablet, Take 1 tablet by mouth Daily with food., Disp: 30 tablet, Rfl: 0    minocycline (MINOCIN,DYNACIN) 100 MG capsule, Take 1 capsule by mouth 2 (Two) Times a Day., Disp: , Rfl:     Multiple Vitamins-Minerals (MULTIVITAMIN ADULT PO), Take 1 tablet by mouth Daily., Disp: , Rfl:     pantoprazole (PROTONIX) 40 MG EC tablet, Take 1 tablet by mouth Daily., Disp: 90 tablet, Rfl: 3    propranolol (INDERAL) 20 MG tablet, Take 1 tablet by mouth 2 (Two) Times a Day., Disp: 180 tablet, Rfl: 3    rimegepant sulfate ODT (NURTEC-ODT) 75 MG disintegrating tablet, Take one tablet by mouth every other day., Disp: 48 tablet, Rfl: 3    Sulfacetamide Sodium, Acne, 10 % lotion, APPLY A SMALL AMOUNT TO SKIN TWICE A DAY APPLY TO ACNE-PRONE AREAS TWICE DAILY., Disp: , Rfl:     Medicines reviewed by Gerard Jacinto RP on 4/10/2025 at  3:24 PM    Drug Interactions  None identified.       Initial Education Provided for Specialty Medication  The patient has been provided with the following education and any applicable administration techniques (i.e. self-injection) have been demonstrated for the therapies indicated. All questions and concerns have been addressed prior to the patient receiving the medication, and the patient has verbalized understanding of the education and any materials provided.  Additional patient  education shall be provided and documented upon request by the patient, provider or payer.      Nurtec (rimegepant) 75 mg ODT, 1 tablet by mouth every other day  Medication Expectations   Why am I taking this medication? You are taking this medication for migraine prophylaxis.   What should I expect while on this medication? You should expect to see a decrease in the frequency and severity of your migraines.   How does the medication work? Nurtec is a small molecule that binds to calcitonin gene-related peptide (CGRP) and blocks its binding to the receptor decreasing the severity of migraines.   How long will I be on this medication for? The amount of time you will be on this medication will be determined by your doctor and your response to the medication.    How do I take this medication? Take as directed on your prescription label.   What are some possible side effects? Potential side effects including, but not limited to nausea. Patient verbalized understanding.   What happens if I miss a dose? Take the missed dose as soon as possible, and resume the every other day timed from the last dose.     Medication Safety   What are things I should warn my doctor immediately about? Hypersensitivity reactions - trouble breathing or swallowing.   What are things that I should be cautious of? Hypersensitivity reactions (eg, dyspnea, rash), including delayed serious reactions, have occurred; discontinue use if suspected    What are some medications that can interact with this one? Avoid concomitant administration of Nurtec ODT with strong inhibitors of CY, strong or moderate inducers of CYP3A or inhibitors of P-gp or BCRP. Avoid another dose of Nurtec ODT within 48 hours when it is administered with moderate inhibitors of CY. Ask your pharmacist or health care provider before starting new medications     Medication Storage/Handling   How should I handle this medication? Keep this medication out of reach of  pets/children in original container. Ensure hands are dry before opening blister pack.   How does this medication need to be stored? Store at room temperature away from heat/cold, sunlight or moisture   How should I dispose of this medication? There should not be a need to dispose of this medication unless your provider decides to change the dose or therapy. If that is the case, take to your local police station for proper disposal. Some pharmacies also have take-back bins for medication drop-off.      Resources/Support   How can I remind myself to take this medication? You can download reminder apps to help you manage your refills. You may also set an alarm on your phone to remind you. The pharmacy carries pill boxes that you can place next to an area you pass everyday (such as where you place your car keys or where you charge your phone)   Is financial support available?  Yes, [employer] can provide co-pay cards if you have commercial insurance or patient assistance if you have Medicare or no insurance.    Which vaccines are recommended for me? Talk to your doctor about these vaccines: Flu, Coronavirus (COVID-19), Pneumococcal (pneumonia), Tdap, Hepatitis B, Zoster (shingles)           Adherence and Self-Administration  Adherence related to the patient's specialty therapy was discussed with the patient. The Adherence segment of this outreach has been reviewed and updated.   Is there a concern with patient's ability to self administer the medication correctly and without issue?: No  Were any potential barriers to adherence identified during the initial assessment or patient education?: No  Are there any concerns regarding the patient's understanding of the importance of medication adherence?: No  Methods for Supporting Patient Adherence and/or Self-Administration: no further support needed at this time.      Goals of Therapy  Goals related to the patient's specialty therapy were discussed with the  patient. The Patient Goals segment of this outreach has been reviewed and updated.   Goals Addressed Today        Specialty Pharmacy General Goal      Reduce migraine severity and frequency by over 50%. Prior to preventive therapy, patient reports about 2 migraines a week. As of 4/10/25, patient reports about 1 migraine per week and changing rimegepant from only as needed for acute treatment to be dosed every other day for migraine prevention. Patient also prescribed propranolol for additional preventive agent pending efficacy of rimegepant and pending frequency/severity of migraines.                  Reassessment Plan & Follow-Up  Medication Therapy Changes: Changing rimegepant from as needed for acute treatment to dosed every other day for migraine prevention.  Related Plans, Therapy Recommendations, or Therapy Problems to Be Addressed: Nothing further to be addressed at this time.   Pharmacist to perform regular reassessments no more than (6) months from the previous assessment.  Care Coordinator to set up future refill outreaches, coordinate prescription delivery, and escalate clinical questions to pharmacist.   Welcome information and patient satisfaction survey to be sent by specialty pharmacy team with patient's initial fill.    Attestation  Therapeutic appropriateness: Appropriate   I attest the patient was actively involved in and has agreed to the above plan of care. If the prescribed therapy is at any point deemed not appropriate based on the current or future assessments, a consultation will be initiated with the patient's specialty care provider to determine the best course of action. The revised plan of therapy will be documented along with any additional patient education provided. Discussed aforementioned material with patient by phone.    Gerard Jacinto RPH  4/10/2025  15:37 EDT

## 2025-04-10 NOTE — PROGRESS NOTES
Subjective   Taty Chacko is a 30 y.o. female presenting for follow up for migraine headaches. She is currently taking Nurtec 75 mg once daily as needed. This does improve her migraines if she takes it early enough. She has been having about one migraine per week, which is slightly more than normal for her. They tend to occur more often around her period and with barometric pressure changes.     She got  last September and was having anxiety surrounding this. She was given a prescription for propranolol 20 mg daily by her psychiatrist. This helped her anxiety quite a bit during that time. She tolerated the medication well. She has since stopped taking it since her anxiety has improved.     Migraine     Review of Systems   Neurological:  Positive for headache. Negative for dizziness, tremors, seizures, syncope, facial asymmetry, speech difficulty, weakness, light-headedness, numbness, memory problem and confusion.     I have reviewed and confirmed the accuracy of the patient's history: Chief complaint, HPI, ROS, Subjective, and Past Family Social History as entered by the MA/LPN/RN. Danita Harris MA 04/10/25      Objective     Physical Examination:  General Appearance:  Well developed, well nourished, well groomed, alert, and cooperative.  HEENT: Normocephalic.    Neck and Spine: Normal range of motion.  Normal alignment. No mass or tenderness. No bruits.      Neurological examination:   Higher Integrative Function: Oriented to time, place, and person. Normal registration, recall attention span and concentration. Normal language including comprehension, spontaneous speech, repetition, reading, writing, naming and vocabulary. No neglect with normal visual-spatial function and construction. Normal fund of knowledge and higher integrative function.   CN II: Pupils are equal, round and reactive to light. Normal visual acuity and visual fields.   CN III IV VI: Extraocular movements are full without nystagmus.   CN  V: Normal facial sensation and strength of muscles of mastication.   CN VII: Facial movements are symmetric. No weakness.   CN VIII: Auditory acuity is normal.  CN IX & X: Symmetric palatal movement.   CN XI: Sternocleidomastoid and trapezius are normal. No weakness.   CN XII: The tongue is midline. No atrophy or fasciculations.  Motor: Normal muscle strength, bulk and tone in upper and lower extremities. No fasciculations, rigidity, spasticity, or abnormal movements.   Station and Gait: Normal gait and station.         Assessment & Plan   Diagnoses and all orders for this visit:    1. Migraine without aura and with status migrainosus, not intractable (Primary)       Since her migraines have increased in frequency we discussed two potential options, the first one being to increase Nurtec to every other day as a prophylactic. Option 2 was to restart propranolol 20 mg daily. She is going to try the Nurtec first for the next few weeks and if this does not seem to help she will start the propranolol 20 mg daily. She can increase up to 20 mg twice a day if needed. Side effects reviewed. She will call with any problems.     Follow up 1 year, sooner if needed.

## (undated) DEVICE — GLV SURG BIOGEL LTX PF 6 1/2

## (undated) DEVICE — ENDOPATH XCEL BLADELESS TROCARS WITH STABILITY SLEEVES: Brand: ENDOPATH XCEL

## (undated) DEVICE — SUT ETHLN 3/0 PS1 18IN 1663H

## (undated) DEVICE — ENDOPATH XCEL UNIVERSAL TROCAR STABLILITY SLEEVES: Brand: ENDOPATH XCEL

## (undated) DEVICE — DISPOSABLE TOURNIQUET CUFF SINGLE BLADDER, SINGLE PORT AND QUICK CONNECT CONNECTOR: Brand: COLOR CUFF

## (undated) DEVICE — ADHS SKIN SURG TISS VISC PREMIERPRO EXOFIN HI/VISC FAST/DRY

## (undated) DEVICE — PK LAP CHOLE BG

## (undated) DEVICE — UNDERCAST PADDING: Brand: DEROYAL

## (undated) DEVICE — PATIENT RETURN ELECTRODE, SINGLE-USE, CONTACT QUALITY MONITORING, ADULT, WITH 9FT CORD, FOR PATIENTS WEIGING OVER 33LBS. (15KG): Brand: MEGADYNE

## (undated) DEVICE — SUT MNCRYL 4/0 PS2 18 IN

## (undated) DEVICE — ENDOPOUCH RETRIEVER SPECIMEN RETRIEVAL BAGS: Brand: ENDOPOUCH RETRIEVER

## (undated) DEVICE — CATH IV INSYTE AUTOGARD 14G 1 1/2IN ORNG

## (undated) DEVICE — VISUALIZATION SYSTEM: Brand: CLEARIFY

## (undated) DEVICE — DRP C/ARM 41X74IN

## (undated) DEVICE — APPL CHLORAPREP HI/LITE 26ML ORNG

## (undated) DEVICE — SOL NACL 0.9PCT 1000ML

## (undated) DEVICE — DRSNG WND GZ CURAD OIL EMULSION 3X3IN STRL

## (undated) DEVICE — BLD DISSCT COOL CUT SJ CRVD 4MM 13CM

## (undated) DEVICE — PK ARTHSCP 40

## (undated) DEVICE — SKIN PREP TRAY W/CHG: Brand: MEDLINE INDUSTRIES, INC.

## (undated) DEVICE — BNDG ELAS ELITE V/CLOSE 4IN 5YD LF STRL

## (undated) DEVICE — LAPAROSCOPIC SMOKE FILTRATION SYSTEM: Brand: PALL LAPAROSHIELD® PLUS LAPAROSCOPIC SMOKE FILTRATION SYSTEM

## (undated) DEVICE — SUT VIC 0/0 UR6 27IN DYED J603H